# Patient Record
Sex: FEMALE | Race: WHITE | Employment: FULL TIME | URBAN - METROPOLITAN AREA
[De-identification: names, ages, dates, MRNs, and addresses within clinical notes are randomized per-mention and may not be internally consistent; named-entity substitution may affect disease eponyms.]

---

## 2017-01-16 ENCOUNTER — TELEPHONE (OUTPATIENT)
Dept: ENDOCRINOLOGY | Age: 57
End: 2017-01-16

## 2017-01-23 ENCOUNTER — OFFICE VISIT (OUTPATIENT)
Dept: ENDOCRINOLOGY | Age: 57
End: 2017-01-23

## 2017-01-23 VITALS
HEIGHT: 65 IN | TEMPERATURE: 98.8 F | HEART RATE: 95 BPM | OXYGEN SATURATION: 100 % | RESPIRATION RATE: 18 BRPM | DIASTOLIC BLOOD PRESSURE: 84 MMHG | WEIGHT: 204.8 LBS | SYSTOLIC BLOOD PRESSURE: 135 MMHG | BODY MASS INDEX: 34.12 KG/M2

## 2017-01-23 DIAGNOSIS — E10.9 TYPE 1 DIABETES MELLITUS WITHOUT COMPLICATION (HCC): Primary | ICD-10-CM

## 2017-01-23 DIAGNOSIS — E78.2 MIXED HYPERLIPIDEMIA: ICD-10-CM

## 2017-01-23 DIAGNOSIS — I10 ESSENTIAL HYPERTENSION: ICD-10-CM

## 2017-01-23 LAB — HBA1C MFR BLD: 7.9 %

## 2017-01-23 PROCEDURE — 99214 OFFICE O/P EST MOD 30 MIN: CPT | Performed by: INTERNAL MEDICINE

## 2017-01-23 PROCEDURE — 83036 HEMOGLOBIN GLYCOSYLATED A1C: CPT | Performed by: INTERNAL MEDICINE

## 2017-01-24 ENCOUNTER — OFFICE VISIT (OUTPATIENT)
Dept: INTERNAL MEDICINE | Age: 57
End: 2017-01-24

## 2017-01-24 VITALS
DIASTOLIC BLOOD PRESSURE: 70 MMHG | BODY MASS INDEX: 34.52 KG/M2 | HEIGHT: 65 IN | SYSTOLIC BLOOD PRESSURE: 122 MMHG | WEIGHT: 207.2 LBS

## 2017-01-24 DIAGNOSIS — F43.23 SITUATIONAL MIXED ANXIETY AND DEPRESSIVE DISORDER: Primary | ICD-10-CM

## 2017-01-24 PROCEDURE — 99213 OFFICE O/P EST LOW 20 MIN: CPT | Performed by: INTERNAL MEDICINE

## 2017-01-24 RX ORDER — ALPRAZOLAM 1 MG/1
TABLET ORAL
Qty: 90 TABLET | Refills: 0 | Status: SHIPPED | OUTPATIENT
Start: 2017-01-24 | End: 2017-04-17 | Stop reason: SDUPTHER

## 2017-01-24 ASSESSMENT — PATIENT HEALTH QUESTIONNAIRE - PHQ9
SUM OF ALL RESPONSES TO PHQ QUESTIONS 1-9: 0
2. FEELING DOWN, DEPRESSED OR HOPELESS: 0
SUM OF ALL RESPONSES TO PHQ9 QUESTIONS 1 & 2: 0
1. LITTLE INTEREST OR PLEASURE IN DOING THINGS: 0

## 2017-03-06 RX ORDER — ATORVASTATIN CALCIUM 40 MG/1
TABLET, FILM COATED ORAL
Qty: 90 TABLET | Refills: 1 | Status: SHIPPED | OUTPATIENT
Start: 2017-03-06 | End: 2018-01-09 | Stop reason: DRUGHIGH

## 2017-03-06 RX ORDER — PIOGLITAZONEHYDROCHLORIDE 15 MG/1
15 TABLET ORAL DAILY
Qty: 90 TABLET | Refills: 2 | Status: SHIPPED | OUTPATIENT
Start: 2017-03-06 | End: 2017-12-05 | Stop reason: SDUPTHER

## 2017-03-09 ENCOUNTER — TELEPHONE (OUTPATIENT)
Dept: ENDOCRINOLOGY | Age: 57
End: 2017-03-09

## 2017-03-14 RX ORDER — ATORVASTATIN CALCIUM 20 MG/1
20 TABLET, FILM COATED ORAL DAILY
Qty: 90 TABLET | Refills: 1 | Status: SHIPPED | OUTPATIENT
Start: 2017-03-14 | End: 2017-08-31 | Stop reason: SDUPTHER

## 2017-04-17 ENCOUNTER — OFFICE VISIT (OUTPATIENT)
Dept: INTERNAL MEDICINE | Age: 57
End: 2017-04-17

## 2017-04-17 VITALS
SYSTOLIC BLOOD PRESSURE: 144 MMHG | WEIGHT: 204.4 LBS | BODY MASS INDEX: 34.89 KG/M2 | DIASTOLIC BLOOD PRESSURE: 72 MMHG | HEIGHT: 64 IN

## 2017-04-17 DIAGNOSIS — F43.23 SITUATIONAL MIXED ANXIETY AND DEPRESSIVE DISORDER: Primary | ICD-10-CM

## 2017-04-17 DIAGNOSIS — I10 ESSENTIAL HYPERTENSION: ICD-10-CM

## 2017-04-17 PROCEDURE — 99213 OFFICE O/P EST LOW 20 MIN: CPT | Performed by: INTERNAL MEDICINE

## 2017-04-17 RX ORDER — ALPRAZOLAM 1 MG/1
TABLET ORAL
Qty: 90 TABLET | Refills: 0 | OUTPATIENT
Start: 2017-04-17 | End: 2017-07-19 | Stop reason: SDUPTHER

## 2017-04-17 ASSESSMENT — ENCOUNTER SYMPTOMS
RESPIRATORY NEGATIVE: 1
GASTROINTESTINAL NEGATIVE: 1

## 2017-05-17 ENCOUNTER — TELEPHONE (OUTPATIENT)
Dept: ENDOCRINOLOGY | Age: 57
End: 2017-05-17

## 2017-05-17 RX ORDER — ALOGLIPTIN 25 MG/1
25 TABLET, FILM COATED ORAL DAILY
Qty: 30 TABLET | Refills: 3 | Status: SHIPPED | OUTPATIENT
Start: 2017-05-17 | End: 2018-01-09 | Stop reason: ALTCHOICE

## 2017-05-31 RX ORDER — RAMIPRIL 10 MG/1
CAPSULE ORAL
Qty: 90 CAPSULE | Refills: 1 | Status: SHIPPED | OUTPATIENT
Start: 2017-05-31 | End: 2017-12-05 | Stop reason: SDUPTHER

## 2017-07-19 ENCOUNTER — OFFICE VISIT (OUTPATIENT)
Dept: INTERNAL MEDICINE | Age: 57
End: 2017-07-19

## 2017-07-19 VITALS
HEIGHT: 64 IN | SYSTOLIC BLOOD PRESSURE: 132 MMHG | WEIGHT: 199 LBS | DIASTOLIC BLOOD PRESSURE: 72 MMHG | BODY MASS INDEX: 33.97 KG/M2

## 2017-07-19 DIAGNOSIS — F43.23 SITUATIONAL MIXED ANXIETY AND DEPRESSIVE DISORDER: Primary | ICD-10-CM

## 2017-07-19 DIAGNOSIS — I10 ESSENTIAL HYPERTENSION: ICD-10-CM

## 2017-07-19 DIAGNOSIS — F43.89: ICD-10-CM

## 2017-07-19 DIAGNOSIS — E10.9 TYPE 1 DIABETES MELLITUS WITHOUT COMPLICATION (HCC): ICD-10-CM

## 2017-07-19 PROCEDURE — 99213 OFFICE O/P EST LOW 20 MIN: CPT | Performed by: INTERNAL MEDICINE

## 2017-07-19 RX ORDER — ALPRAZOLAM 1 MG/1
TABLET ORAL
Qty: 90 TABLET | Refills: 0 | OUTPATIENT
Start: 2017-07-19 | End: 2017-10-13 | Stop reason: SDUPTHER

## 2017-08-25 ENCOUNTER — HOSPITAL ENCOUNTER (OUTPATIENT)
Dept: MAMMOGRAPHY | Age: 57
Discharge: HOME | End: 2017-08-25
Attending: OBSTETRICS & GYNECOLOGY | Admitting: OBSTETRICS & GYNECOLOGY

## 2017-08-25 DIAGNOSIS — Z12.31 VISIT FOR SCREENING MAMMOGRAM: ICD-10-CM

## 2017-08-31 RX ORDER — GEMFIBROZIL 600 MG/1
TABLET, FILM COATED ORAL
Qty: 180 TABLET | Refills: 2 | Status: SHIPPED | OUTPATIENT
Start: 2017-08-31 | End: 2018-05-22 | Stop reason: SDUPTHER

## 2017-08-31 RX ORDER — ATORVASTATIN CALCIUM 20 MG/1
20 TABLET, FILM COATED ORAL DAILY
Qty: 90 TABLET | Refills: 2 | Status: SHIPPED | OUTPATIENT
Start: 2017-08-31 | End: 2018-05-22 | Stop reason: SDUPTHER

## 2017-09-07 ENCOUNTER — OFFICE VISIT (OUTPATIENT)
Dept: INTERNAL MEDICINE | Age: 57
End: 2017-09-07

## 2017-09-07 VITALS
SYSTOLIC BLOOD PRESSURE: 140 MMHG | HEIGHT: 64 IN | WEIGHT: 201 LBS | TEMPERATURE: 98.1 F | DIASTOLIC BLOOD PRESSURE: 90 MMHG | BODY MASS INDEX: 34.31 KG/M2

## 2017-09-07 DIAGNOSIS — E78.2 MIXED HYPERLIPIDEMIA: ICD-10-CM

## 2017-09-07 DIAGNOSIS — B34.9 VIRAL INFECTION: Primary | ICD-10-CM

## 2017-09-07 DIAGNOSIS — I10 ESSENTIAL HYPERTENSION: ICD-10-CM

## 2017-09-07 DIAGNOSIS — E10.9 TYPE 1 DIABETES MELLITUS WITHOUT COMPLICATION (HCC): ICD-10-CM

## 2017-09-07 PROCEDURE — 99214 OFFICE O/P EST MOD 30 MIN: CPT | Performed by: INTERNAL MEDICINE

## 2017-09-13 ENCOUNTER — HOSPITAL ENCOUNTER (OUTPATIENT)
Dept: OTHER | Age: 57
Discharge: HOME | End: 2017-09-13
Attending: INTERNAL MEDICINE | Admitting: INTERNAL MEDICINE

## 2017-09-13 DIAGNOSIS — E10.9 TYPE 1 DIABETES MELLITUS WITHOUT COMPLICATION (HCC): ICD-10-CM

## 2017-09-13 DIAGNOSIS — I10 ESSENTIAL HYPERTENSION: ICD-10-CM

## 2017-09-13 DIAGNOSIS — E78.2 MIXED HYPERLIPIDEMIA: ICD-10-CM

## 2017-09-13 LAB
A/G RATIO: 1.5 (ref 1.1–2.2)
ALBUMIN SERPL-MCNC: 4.3 G/DL (ref 3.4–5)
ALP BLD-CCNC: 73 U/L (ref 40–129)
ALT SERPL-CCNC: 26 U/L (ref 10–40)
ANION GAP SERPL CALCULATED.3IONS-SCNC: 17 MMOL/L (ref 3–16)
AST SERPL-CCNC: 23 U/L (ref 15–37)
BILIRUB SERPL-MCNC: 0.5 MG/DL (ref 0–1)
BUN BLDV-MCNC: 16 MG/DL (ref 7–20)
CALCIUM SERPL-MCNC: 9.9 MG/DL (ref 8.3–10.6)
CHLORIDE BLD-SCNC: 99 MMOL/L (ref 99–110)
CHOLESTEROL, TOTAL: 165 MG/DL (ref 0–199)
CO2: 24 MMOL/L (ref 21–32)
CREAT SERPL-MCNC: 0.6 MG/DL (ref 0.6–1.1)
ESTIMATED AVERAGE GLUCOSE: 289.1 MG/DL
GFR AFRICAN AMERICAN: >60
GFR NON-AFRICAN AMERICAN: >60
GLOBULIN: 2.9 G/DL
GLUCOSE BLD-MCNC: 158 MG/DL (ref 70–99)
HBA1C MFR BLD: 11.7 %
HDLC SERPL-MCNC: 77 MG/DL (ref 40–60)
LDL CHOLESTEROL CALCULATED: 75 MG/DL
POTASSIUM SERPL-SCNC: 4.1 MMOL/L (ref 3.5–5.1)
SODIUM BLD-SCNC: 140 MMOL/L (ref 136–145)
TOTAL PROTEIN: 7.2 G/DL (ref 6.4–8.2)
TRIGL SERPL-MCNC: 64 MG/DL (ref 0–150)
VLDLC SERPL CALC-MCNC: 13 MG/DL

## 2017-09-18 ENCOUNTER — TELEPHONE (OUTPATIENT)
Dept: INTERNAL MEDICINE | Age: 57
End: 2017-09-18

## 2017-09-18 RX ORDER — AMOXICILLIN 500 MG/1
500 CAPSULE ORAL 3 TIMES DAILY
Qty: 30 CAPSULE | Refills: 0 | Status: SHIPPED | OUTPATIENT
Start: 2017-09-18 | End: 2018-01-09 | Stop reason: ALTCHOICE

## 2017-10-04 ENCOUNTER — TELEPHONE (OUTPATIENT)
Dept: INTERNAL MEDICINE | Age: 57
End: 2017-10-04

## 2017-10-04 NOTE — TELEPHONE ENCOUNTER
Symptoms: calling to inform Lillie Potts MD that she did complete amoxicillin (AMOXIL) 500 MG capsule   However still experiencing a horrible cough clear and appears to be worse during the day not as bad at night     Would like to be advised if she can be Rx an cough medication with codeine per Lillie Potts MD recommendation   Pharmacy:listed   Please call back today [phone]

## 2017-10-05 ENCOUNTER — OFFICE VISIT (OUTPATIENT)
Dept: INTERNAL MEDICINE | Age: 57
End: 2017-10-05

## 2017-10-05 VITALS
SYSTOLIC BLOOD PRESSURE: 124 MMHG | BODY MASS INDEX: 33.63 KG/M2 | WEIGHT: 197 LBS | DIASTOLIC BLOOD PRESSURE: 70 MMHG | HEIGHT: 64 IN | TEMPERATURE: 98.3 F

## 2017-10-05 DIAGNOSIS — J32.4 CHRONIC PANSINUSITIS: Primary | ICD-10-CM

## 2017-10-05 DIAGNOSIS — E10.9 TYPE 1 DIABETES MELLITUS WITHOUT COMPLICATION (HCC): ICD-10-CM

## 2017-10-05 DIAGNOSIS — I10 ESSENTIAL HYPERTENSION: ICD-10-CM

## 2017-10-05 PROCEDURE — 99213 OFFICE O/P EST LOW 20 MIN: CPT | Performed by: INTERNAL MEDICINE

## 2017-10-05 RX ORDER — FLUTICASONE PROPIONATE 50 MCG
2 SPRAY, SUSPENSION (ML) NASAL DAILY
Qty: 1 BOTTLE | Refills: 1 | Status: SHIPPED | OUTPATIENT
Start: 2017-10-05 | End: 2018-01-09 | Stop reason: ALTCHOICE

## 2017-10-05 RX ORDER — AMOXICILLIN AND CLAVULANATE POTASSIUM 875; 125 MG/1; MG/1
1 TABLET, FILM COATED ORAL 2 TIMES DAILY
Qty: 20 TABLET | Refills: 0 | Status: SHIPPED | OUTPATIENT
Start: 2017-10-05 | End: 2017-10-15

## 2017-10-05 NOTE — PROGRESS NOTES
Outpatient Follow Up Note    Subjective:   CHIEF COMPLAINT / HPI:     Rob Patel presents today regarding her ongoing sinus condition where she is continuously unable to clear her sinuses. She has had a course of of amoxicillin and decongestants without improvement. She has had no fever chills or toxicity. She also has other medical problems including Diabetes Type 1, Hyperlipidemia, Asthma and Pancreatitis       Past Medical History:    Past Medical History:   Diagnosis Date    Allergic rhinitis     Asthma     H/O- stress related    Diabetes mellitus, type 1     History of blood transfusion 2001?  Hyperlipidemia     Other acute reactions to stress     Pancreatitis 2000    Rectal abscess     II       Social History:    History   Smoking Status    Never Smoker   Smokeless Tobacco    Never Used         Family History   Problem Relation Age of Onset    Heart Disease Mother     Diabetes Father        Current Medications:  Current Outpatient Prescriptions on File Prior to Visit   Medication Sig Dispense Refill    amoxicillin (AMOXIL) 500 MG capsule Take 1 capsule by mouth 3 times daily 30 capsule 0    sertraline (ZOLOFT) 50 MG tablet TAKE 1 TABLET DAILY FOR MOOD 90 tablet 2    gemfibrozil (LOPID) 600 MG tablet TAKE 1 TABLET TWICE A DAY FOR TRIGLYCERIDES 180 tablet 2    atorvastatin (LIPITOR) 20 MG tablet Take 1 tablet by mouth daily 90 tablet 2    ALPRAZolam (XANAX) 1 MG tablet TAKE ONE TABLET BY MOUTH EVERY NIGHT AT BEDTIME AS NEEDED FOR ANXIETY AND SLEEP 90 tablet 0    ramipril (ALTACE) 10 MG capsule TAKE 1 CAPSULE DAILY FOR BLOOD PRESSURE 90 capsule 1    alogliptin (NESINA) 25 MG TABS tablet Take 1 tablet by mouth daily 30 tablet 3    pioglitazone (ACTOS) 15 MG tablet Take 1 tablet by mouth daily 90 tablet 2    atorvastatin (LIPITOR) 40 MG tablet Takes 20mg nightly 90 tablet 1    glucose blood VI test strips (ONE TOUCH ULTRA TEST) strip Test BS 3 times a day.  100 each 3    insulin glargine vomiting, diarrhea, constipation, abdominal pain or changes in bowel habits. : No urinary frequency, urgency,  SKIN: No cyanosis or skin lesions. MUSCULOSKELETAL: No new muscle or joint pain. NEUROLOGICAL: No change in sensation or strength. PSYCHIATRIC: No anxiety, insomnia or depression    Objective:   PHYSICAL EXAM:        VITALS:  /70  Temp 98.3 °F (36.8 °C) (Oral)   Ht 5' 4\" (1.626 m)  Wt 197 lb (89.4 kg)  BMI 33.81 kg/m2  CONSTITUTIONAL: Cooperative, no apparent distress, and appears well nourished   NEUROLOGIC:  Awake and orientated to person, place and time. Sensation normal Motor normal.  PSYCH: Calm affect. SKIN: Warm and dry. HEENT: Sclera non-icteric, normocephalic. EOMI DAMASO Sinuses congested  Neck: Supple, no elevation of JVP,  LUNGS:  No increased work of breathing and clear to auscultation, no crackles or wheezing  CARDIOVASCULAR:  Regular rate and rhythm with no murmurs, gallops, rubs, or abnormal heart sounds.     DATA:    Lab Results   Component Value Date    ALT 26 09/13/2017    AST 23 09/13/2017    ALKPHOS 73 09/13/2017    BILITOT 0.5 09/13/2017     Lab Results   Component Value Date    CREATININE 0.6 09/13/2017    BUN 16 09/13/2017     09/13/2017    K 4.1 09/13/2017    CL 99 09/13/2017    CO2 24 09/13/2017     Lab Results   Component Value Date    TSH 1.45 10/21/2016    K2CZXFN 6.7 05/03/2016     Lab Results   Component Value Date    WBC 4.9 06/01/2016    HGB 13.1 06/01/2016    HCT 39.4 06/01/2016    MCV 90.9 06/01/2016     06/01/2016     No components found for: CHLPL  Lab Results   Component Value Date    TRIG 64 09/13/2017    TRIG 49 10/21/2016    TRIG 37 05/03/2016     Lab Results   Component Value Date    HDL 77 (H) 09/13/2017    HDL 76 (H) 10/21/2016    HDL 72 05/03/2016     Lab Results   Component Value Date    LDLCALC 75 09/13/2017    LDLCALC 73 10/21/2016    LDLCALC 60 05/03/2016     Lab Results   Component Value Date    LABVLDL 13 09/13/2017    LABVLDL 10 10/21/2016      Assessment Plan :      1 Sinusitis which has not responded to amoxicillin and decongestants       Will increase the antibiotic activity by prescribing Augmentin    2/ Diabetes Type one not being affected by the sinus congestion.        Will continue present dose    Aleksandar Garcia MD  I attest that this note was completed entirely by me

## 2017-10-13 ENCOUNTER — OFFICE VISIT (OUTPATIENT)
Dept: INTERNAL MEDICINE | Age: 57
End: 2017-10-13

## 2017-10-13 VITALS
BODY MASS INDEX: 33.63 KG/M2 | SYSTOLIC BLOOD PRESSURE: 114 MMHG | DIASTOLIC BLOOD PRESSURE: 62 MMHG | WEIGHT: 197 LBS | HEIGHT: 64 IN

## 2017-10-13 DIAGNOSIS — E78.5 HYPERLIPIDEMIA, UNSPECIFIED HYPERLIPIDEMIA TYPE: ICD-10-CM

## 2017-10-13 DIAGNOSIS — E10.9 TYPE 1 DIABETES MELLITUS WITHOUT COMPLICATION (HCC): ICD-10-CM

## 2017-10-13 DIAGNOSIS — I10 ESSENTIAL HYPERTENSION: ICD-10-CM

## 2017-10-13 DIAGNOSIS — F43.23 SITUATIONAL MIXED ANXIETY AND DEPRESSIVE DISORDER: Primary | ICD-10-CM

## 2017-10-13 PROCEDURE — 99214 OFFICE O/P EST MOD 30 MIN: CPT | Performed by: INTERNAL MEDICINE

## 2017-10-13 RX ORDER — ALPRAZOLAM 1 MG/1
TABLET ORAL
Qty: 90 TABLET | Refills: 0 | OUTPATIENT
Start: 2017-10-13 | End: 2018-01-10 | Stop reason: SDUPTHER

## 2017-10-13 NOTE — ASSESSMENT & PLAN NOTE
Controlled with diet exercise and medication without side effects. See endocrinologist on a regular basis.

## 2017-10-13 NOTE — PROGRESS NOTES
Outpatient Follow Up Note    Subjective:   CHIEF COMPLAINT / HPI:    Alfredo Silva  presents today for follow up on her anxiety treatment which she requires to control her stress. She also has other ongoing medical problems including Diabetes,Hypertension, Hyperlipidemi and asthma     Past Medical History:    Past Medical History:   Diagnosis Date    Allergic rhinitis     Asthma     H/O- stress related    Diabetes mellitus, type 1     History of blood transfusion 2001?  Hyperlipidemia     Other acute reactions to stress     Pancreatitis 2000    Rectal abscess     II       Social History:    History   Smoking Status    Never Smoker   Smokeless Tobacco    Never Used         Family History   Problem Relation Age of Onset    Heart Disease Mother     Diabetes Father        Current Medications:  Current Outpatient Prescriptions on File Prior to Visit   Medication Sig Dispense Refill           fluticasone (FLONASE) 50 MCG/ACT nasal spray 2 sprays by Nasal route daily 1 Bottle 1      30 capsule 0    sertraline (ZOLOFT) 50 MG tablet TAKE 1 TABLET DAILY FOR MOOD 90 tablet 2    gemfibrozil (LOPID) 600 MG tablet TAKE 1 TABLET TWICE A DAY FOR TRIGLYCERIDES 180 tablet 2    atorvastatin (LIPITOR) 20 MG tablet Take 1 tablet by mouth daily 90 tablet 2    ramipril (ALTACE) 10 MG capsule TAKE 1 CAPSULE DAILY FOR BLOOD PRESSURE 90 capsule 1    alogliptin (NESINA) 25 MG TABS tablet Take 1 tablet by mouth daily 30 tablet 3    pioglitazone (ACTOS) 15 MG tablet Take 1 tablet by mouth daily 90 tablet 2    atorvastatin (LIPITOR) 40 MG tablet Takes 20mg nightly 90 tablet 1    glucose blood VI test strips (ONE TOUCH ULTRA TEST) strip Test BS 3 times a day.  100 each 3    insulin glargine (LANTUS SOLOSTAR) 100 UNIT/ML injection pen Inject 70 Units into the skin nightly 10 Pen 3    Dulaglutide (TRULICITY) 6.91 CP/3.8DI SOPN Inject 0.75 mg into the skin once a week 4 Pen 3    metFORMIN (GLUCOPHAGE) 850 MG tablet TAKE 1 TABLET THREE TIMES A DAY WITH MEALS FOR DIABETES 270 tablet 2    ONETOUCH DELICA LANCETS 86N MISC 1 each by Does not apply route 3 times daily (before meals) 100 each 11    Blood Glucose Monitoring Suppl (ONE TOUCH ULTRA 2) W/DEVICE KIT 1 kit by Does not apply route daily as needed (Test BS 3 times a day.) 1 kit 0    ONE TOUCH ULTRASOFT LANCETS MISC Test BS 3 times a day. 100 each 3    Glucosamine-Chondroitin (GLUCOSAMINE CHONDR COMPLEX PO) Take by mouth daily      Multiple Vitamins-Minerals (THERAPEUTIC MULTIVITAMIN-MINERALS) tablet Take 1 tablet by mouth daily      Cholecalciferol (CVS VIT D 5000 HIGH-POTENCY) 5000 UNITS CAPS capsule All patients should take 5000 IU or more Vitamin D every day for good general health. There are myriad benefits to higher Vitamin D levels. Women should also take Calcium 600 mg twice a day for bone health. 90 capsule 12    cyanocobalamin (CVS VITAMIN B12) 1000 MCG tablet Everyone should take OTC B12 1000 mcg twice a day for neurological health. High B12 levels help prevent dementia. 180 tablet 3    fish oil-omega-3 fatty acids 1000 MG capsule Take 1 g by mouth three times daily.  fexofenadine (ALLEGRA) 180 MG tablet Take 180 mg by mouth daily. No current facility-administered medications on file prior to visit. Allergies   Allergen Reactions    Morphine      Unsure of reaction       REVIEW OF SYSTEMS:      CONSTITUTIONAL: No major weight gain or loss, fatigue, weakness, night sweats or fever. HEENT: No new vision difficulties or ringing in the ears. No congestion or sore throat. RESPIRATORY: No new SOB, PND, orthopnea or cough. CARDIOVASCULAR: No chest pain or palpitations. GI: No diarrhea, constipation, abdominal pain or changes in bowel habits. : No urinary frequency, urgency, incontinence hematuria or dysuria. SKIN: No cyanosis or skin lesions. MUSCULOSKELETAL: No new muscle or joint pain. NEUROLOGICAL: No syncope or TIA-like symptoms.  No change in sensation or strength. PSYCHIATRIC:Positive for anxiety and depression    Objective:   PHYSICAL EXAM:        VITALS:  /62   Ht 5' 4\" (1.626 m)   Wt 197 lb (89.4 kg)   BMI 33.81 kg/m²   CONSTITUTIONAL: Cooperative, no apparent distress, and appears well nourished / developed  NEUROLOGIC:  Awake and orientated to person, place and time. Sensation normal Motor normal.  PSYCH: Calm affect. SKIN: Warm and dry. HEENT: Sclera non-icteric, normocephalic. EOMI DAMASO  Neck: Supple, no elevation of JVP, normal carotid pulses with no bruits and thyroid normal size. LUNGS:  No increased work of breathing and clear to auscultation, no crackles or wheezing  CARDIOVASCULAR:  Regular rate and rhythm with no murmurs, gallops, rubs, or abnormal heart sounds. The apical impulse not displaced  JVP is not elevated  ABDOMEN:  Normal bowel sounds, non-distended and non-tender to palpation. No liver or spleen enlargement. EXT: No edema, no calf tenderness. Pulses are present bilaterally. DATA:    Lab Results   Component Value Date    ALT 26 09/13/2017    AST 23 09/13/2017    ALKPHOS 73 09/13/2017    BILITOT 0.5 09/13/2017     Lab Results   Component Value Date    CREATININE 0.6 09/13/2017    BUN 16 09/13/2017     09/13/2017    K 4.1 09/13/2017    CL 99 09/13/2017    CO2 24 09/13/2017         Assessment Plan :     1. Situational mixed anxiety and depressive disorder  Anxiety under good control on present medication    2. Diabetes mellitus type 1 ([facility] [address]    Monitors and well controlled on present regimen without  complications. Sees Endocrinologist and podiatrist and opthalmoligist on regular basis. 3.  Hyperlipidemia    Remains on statin  No c/o muscle aches  No chest pain  Last lipid panel was in good range  Discussed healthy diet and lifestyle    4. Essential Hypertension    Blood pressure control has been optimal.  No complaints of headaches. No end organ damage.   Encouraged to limit sodium intake

## 2017-12-06 RX ORDER — RAMIPRIL 10 MG/1
CAPSULE ORAL
Qty: 90 CAPSULE | Refills: 0 | Status: SHIPPED | OUTPATIENT
Start: 2017-12-06 | End: 2018-03-02 | Stop reason: SDUPTHER

## 2017-12-06 RX ORDER — PIOGLITAZONEHYDROCHLORIDE 15 MG/1
TABLET ORAL
Qty: 90 TABLET | Refills: 0 | Status: SHIPPED | OUTPATIENT
Start: 2017-12-06 | End: 2018-03-02 | Stop reason: SDUPTHER

## 2018-01-09 ENCOUNTER — OFFICE VISIT (OUTPATIENT)
Dept: ENDOCRINOLOGY | Age: 58
End: 2018-01-09

## 2018-01-09 VITALS
HEART RATE: 92 BPM | HEIGHT: 65 IN | RESPIRATION RATE: 18 BRPM | OXYGEN SATURATION: 98 % | DIASTOLIC BLOOD PRESSURE: 83 MMHG | BODY MASS INDEX: 33.32 KG/M2 | SYSTOLIC BLOOD PRESSURE: 133 MMHG | WEIGHT: 200 LBS

## 2018-01-09 DIAGNOSIS — I10 ESSENTIAL HYPERTENSION: ICD-10-CM

## 2018-01-09 DIAGNOSIS — E10.9 TYPE 1 DIABETES MELLITUS WITHOUT COMPLICATION (HCC): Primary | ICD-10-CM

## 2018-01-09 DIAGNOSIS — E78.5 HYPERLIPIDEMIA, UNSPECIFIED HYPERLIPIDEMIA TYPE: ICD-10-CM

## 2018-01-09 LAB — HBA1C MFR BLD: 10.9 %

## 2018-01-09 PROCEDURE — 99214 OFFICE O/P EST MOD 30 MIN: CPT | Performed by: INTERNAL MEDICINE

## 2018-01-09 PROCEDURE — 83036 HEMOGLOBIN GLYCOSYLATED A1C: CPT | Performed by: INTERNAL MEDICINE

## 2018-01-09 NOTE — PROGRESS NOTES
Endocrinology      Blank Alva M.D. Phone: [phone]   FAX: [phone]       Arnie Locke   YOB: 1960    Date of Visit:  1/9/2018    Allergies   Allergen Reactions    Morphine      Unsure of reaction     Outpatient Prescriptions Marked as Taking for the 1/9/18 encounter (Office Visit) with Fercho Flores MD   Medication Sig Dispense Refill    insulin glargine (LANTUS SOLOSTAR) 100 UNIT/ML injection pen Inject 70 Units into the skin nightly 15 mL 0    pioglitazone (ACTOS) 15 MG tablet TAKE 1 TABLET DAILY 90 tablet 0    metFORMIN (GLUCOPHAGE) 850 MG tablet TAKE 1 TABLET THREE TIMES A DAY WITH MEALS FOR DIABETES 270 tablet 0    ramipril (ALTACE) 10 MG capsule TAKE 1 CAPSULE DAILY FOR BLOOD PRESSURE 90 capsule 0    ALPRAZolam (XANAX) 1 MG tablet TAKE ONE TABLET BY MOUTH EVERY NIGHT AT BEDTIME AS NEEDED FOR ANXIETY AND SLEEP 90 tablet 0    sertraline (ZOLOFT) 50 MG tablet TAKE 1 TABLET DAILY FOR MOOD 90 tablet 2    gemfibrozil (LOPID) 600 MG tablet TAKE 1 TABLET TWICE A DAY FOR TRIGLYCERIDES 180 tablet 2    atorvastatin (LIPITOR) 20 MG tablet Take 1 tablet by mouth daily 90 tablet 2    glucose blood VI test strips (ONE TOUCH ULTRA TEST) strip Test BS 3 times a day. 100 each 3    ONETOUCH DELICA LANCETS 51D MISC 1 each by Does not apply route 3 times daily (before meals) 100 each 11    Blood Glucose Monitoring Suppl (ONE TOUCH ULTRA 2) W/DEVICE KIT 1 kit by Does not apply route daily as needed (Test BS 3 times a day.) 1 kit 0    Glucosamine-Chondroitin (GLUCOSAMINE CHONDR COMPLEX PO) Take by mouth daily      Multiple Vitamins-Minerals (THERAPEUTIC MULTIVITAMIN-MINERALS) tablet Take 1 tablet by mouth daily      Cholecalciferol (CVS VIT D 5000 HIGH-POTENCY) 5000 UNITS CAPS capsule All patients should take 5000 IU or more Vitamin D every day for good general health. There are myriad benefits to higher Vitamin D levels.  Women should also take Calcium 600 mg twice a day for bone health. 90 capsule 12    cyanocobalamin (CVS VITAMIN B12) 1000 MCG tablet Everyone should take OTC B12 1000 mcg twice a day for neurological health. High B12 levels help prevent dementia. 180 tablet 3    fish oil-omega-3 fatty acids 1000 MG capsule Take 1 g by mouth three times daily. Vitals:    01/09/18 1523   BP: 133/83   Site: Left Arm   Position: Sitting   Cuff Size: Large Adult   Pulse: 92   Resp: 18   SpO2: 98%   Weight: 200 lb (90.7 kg)   Height: 5' 4.6\" (1.641 m)     Body mass index is 33.7 kg/m². Wt Readings from Last 3 Encounters:   01/09/18 200 lb (90.7 kg)   10/13/17 197 lb (89.4 kg)   10/05/17 197 lb (89.4 kg)     BP Readings from Last 3 Encounters:   01/09/18 133/83   10/13/17 114/62   10/05/17 124/70        Past Medical History:   Diagnosis Date    Allergic rhinitis     Asthma     H/O- stress related    Diabetes mellitus, type 1     History of blood transfusion 2001?  Hyperlipidemia     Other acute reactions to stress     Pancreatitis 2000    Rectal abscess     II     Past Surgical History:   Procedure Laterality Date    COLONOSCOPY      HYSTERECTOMY      KNEE ARTHROSCOPY Right 2014 x2    Dr. Viet King ARTHROSCOPY Left 2015    OTHER SURGICAL HISTORY  6/1/16    EUS, no interventions    OTHER SURGICAL HISTORY  6/2/16    EXAMINATION UNDER ANESTHESIA, EXCISION OF ANAL MASS    OVARY REMOVAL Bilateral      Family History   Problem Relation Age of Onset    Heart Disease Mother     Diabetes Father      History   Smoking Status    Never Smoker   Smokeless Tobacco    Never Used      History   Alcohol Use No       HPI        Elaine Bhakta is a 65 y.o. female who is here for a follow-up for management of uncontrolled DM. PCP   Jenifer Peck MD      Was previously seeing Dr. Ludivina Coughlin at the [facility].      Patient has a PMH of  DM, hypertension, hyperlipidemia, obesity     Diagnosed with Diabetes Mellitus  in 2001 after she had

## 2018-01-10 ENCOUNTER — OFFICE VISIT (OUTPATIENT)
Dept: INTERNAL MEDICINE | Age: 58
End: 2018-01-10

## 2018-01-10 VITALS — BODY MASS INDEX: 34.33 KG/M2 | HEIGHT: 64 IN | SYSTOLIC BLOOD PRESSURE: 154 MMHG | DIASTOLIC BLOOD PRESSURE: 90 MMHG

## 2018-01-10 DIAGNOSIS — F43.23 SITUATIONAL MIXED ANXIETY AND DEPRESSIVE DISORDER: Primary | ICD-10-CM

## 2018-01-10 PROCEDURE — 99213 OFFICE O/P EST LOW 20 MIN: CPT | Performed by: INTERNAL MEDICINE

## 2018-01-10 RX ORDER — ALPRAZOLAM 1 MG/1
TABLET ORAL
Qty: 90 TABLET | Refills: 0 | OUTPATIENT
Start: 2018-01-10 | End: 2018-04-10 | Stop reason: SDUPTHER

## 2018-01-13 ASSESSMENT — ENCOUNTER SYMPTOMS
ABDOMINAL PAIN: 0
SHORTNESS OF BREATH: 0
COUGH: 0

## 2018-01-13 NOTE — PROGRESS NOTES
Subjective:      Patient ID:HPI  Patient returns for reevaluation of and refill of her medication Alprazolam which she takes for her anxiety and depression. She takes the medications as directed and has no side effects. She has multiple other medical problems which are stable at present. Current Outpatient Prescriptions   Medication Sig Dispense Refill    ALPRAZolam (XANAX) 1 MG tablet TAKE ONE TABLET BY MOUTH EVERY NIGHT AT BEDTIME AS NEEDED FOR ANXIETY AND SLEEP. 90 tablet 0    insulin lispro (HUMALOG KWIKPEN) 100 UNIT/ML pen Inject 8 Units into the skin 3 times daily (before meals) 5 pen 3    insulin glargine (LANTUS SOLOSTAR) 100 UNIT/ML injection pen Inject 50 Units into the skin nightly 5 pen 5    Insulin Pen Needle (B-D UF III MINI PEN NEEDLES) 31G X 5 MM MISC Inject insulin 4 times a day 120 each 6    pioglitazone (ACTOS) 15 MG tablet TAKE 1 TABLET DAILY 90 tablet 0    metFORMIN (GLUCOPHAGE) 850 MG tablet TAKE 1 TABLET THREE TIMES A DAY WITH MEALS FOR DIABETES 270 tablet 0    ramipril (ALTACE) 10 MG capsule TAKE 1 CAPSULE DAILY FOR BLOOD PRESSURE 90 capsule 0    sertraline (ZOLOFT) 50 MG tablet TAKE 1 TABLET DAILY FOR MOOD 90 tablet 2    gemfibrozil (LOPID) 600 MG tablet TAKE 1 TABLET TWICE A DAY FOR TRIGLYCERIDES 180 tablet 2    atorvastatin (LIPITOR) 20 MG tablet Take 1 tablet by mouth daily 90 tablet 2    glucose blood VI test strips (ONE TOUCH ULTRA TEST) strip Test BS 3 times a day.  100 each 3    ONETOUCH DELICA LANCETS 41V MISC 1 each by Does not apply route 3 times daily (before meals) 100 each 11    Blood Glucose Monitoring Suppl (ONE TOUCH ULTRA 2) W/DEVICE KIT 1 kit by Does not apply route daily as needed (Test BS 3 times a day.) 1 kit 0    Glucosamine-Chondroitin (GLUCOSAMINE CHONDR COMPLEX PO) Take by mouth daily      Multiple Vitamins-Minerals (THERAPEUTIC MULTIVITAMIN-MINERALS) tablet Take 1 tablet by mouth daily      Cholecalciferol (CVS VIT D 5000 HIGH-POTENCY) 5000 UNITS

## 2018-02-02 ENCOUNTER — TELEPHONE (OUTPATIENT)
Dept: ENDOCRINOLOGY | Age: 58
End: 2018-02-02

## 2018-02-02 NOTE — TELEPHONE ENCOUNTER
I spoke with Nathaniel Louie, let her know [facility]] does not have a savings card. She will check to see if the Lantus savings card can be used this year too. I also let her know about the lower cost of Novolin R and NPH. She states this is an inconvienence to her. She will think about the options and call back.

## 2018-03-05 RX ORDER — PIOGLITAZONEHYDROCHLORIDE 15 MG/1
TABLET ORAL
Qty: 90 TABLET | Refills: 3 | Status: SHIPPED | OUTPATIENT
Start: 2018-03-05 | End: 2019-02-05 | Stop reason: SDUPTHER

## 2018-03-05 RX ORDER — RAMIPRIL 10 MG/1
CAPSULE ORAL
Qty: 90 CAPSULE | Refills: 3 | Status: SHIPPED | OUTPATIENT
Start: 2018-03-05 | End: 2019-02-05 | Stop reason: SDUPTHER

## 2018-04-10 ENCOUNTER — OFFICE VISIT (OUTPATIENT)
Dept: INTERNAL MEDICINE | Age: 58
End: 2018-04-10

## 2018-04-10 VITALS
WEIGHT: 210 LBS | HEART RATE: 66 BPM | HEIGHT: 65 IN | BODY MASS INDEX: 34.99 KG/M2 | DIASTOLIC BLOOD PRESSURE: 80 MMHG | SYSTOLIC BLOOD PRESSURE: 130 MMHG

## 2018-04-10 DIAGNOSIS — E78.5 HYPERLIPIDEMIA, UNSPECIFIED HYPERLIPIDEMIA TYPE: ICD-10-CM

## 2018-04-10 DIAGNOSIS — I10 ESSENTIAL HYPERTENSION: ICD-10-CM

## 2018-04-10 DIAGNOSIS — F41.1 GENERALIZED ANXIETY DISORDER: Primary | ICD-10-CM

## 2018-04-10 DIAGNOSIS — E10.9 TYPE 1 DIABETES MELLITUS WITHOUT COMPLICATION (HCC): ICD-10-CM

## 2018-04-10 PROCEDURE — 99213 OFFICE O/P EST LOW 20 MIN: CPT | Performed by: INTERNAL MEDICINE

## 2018-04-10 RX ORDER — ALPRAZOLAM 1 MG/1
TABLET ORAL
Qty: 90 TABLET | Refills: 0 | OUTPATIENT
Start: 2018-04-10 | End: 2018-07-09 | Stop reason: SDUPTHER

## 2018-04-18 ENCOUNTER — OFFICE VISIT (OUTPATIENT)
Dept: ENDOCRINOLOGY | Age: 58
End: 2018-04-18

## 2018-04-18 ENCOUNTER — HOSPITAL ENCOUNTER (OUTPATIENT)
Dept: OTHER | Age: 58
Discharge: HOME | End: 2018-04-18
Attending: INTERNAL MEDICINE | Admitting: INTERNAL MEDICINE

## 2018-04-18 VITALS
DIASTOLIC BLOOD PRESSURE: 73 MMHG | BODY MASS INDEX: 35.12 KG/M2 | RESPIRATION RATE: 20 BRPM | HEART RATE: 99 BPM | HEIGHT: 65 IN | WEIGHT: 210.8 LBS | OXYGEN SATURATION: 93 % | SYSTOLIC BLOOD PRESSURE: 125 MMHG

## 2018-04-18 DIAGNOSIS — E55.9 VITAMIN D DEFICIENCY: ICD-10-CM

## 2018-04-18 DIAGNOSIS — I10 ESSENTIAL HYPERTENSION: ICD-10-CM

## 2018-04-18 DIAGNOSIS — R53.83 OTHER FATIGUE: ICD-10-CM

## 2018-04-18 DIAGNOSIS — E10.9 TYPE 1 DIABETES MELLITUS WITHOUT COMPLICATION (HCC): Primary | ICD-10-CM

## 2018-04-18 DIAGNOSIS — E10.9 TYPE 1 DIABETES MELLITUS WITHOUT COMPLICATION (HCC): ICD-10-CM

## 2018-04-18 DIAGNOSIS — E78.5 HYPERLIPIDEMIA, UNSPECIFIED HYPERLIPIDEMIA TYPE: ICD-10-CM

## 2018-04-18 LAB
A/G RATIO: 1.9 (ref 1.1–2.2)
ALBUMIN SERPL-MCNC: 4.5 G/DL (ref 3.4–5)
ALP BLD-CCNC: 59 U/L (ref 40–129)
ALT SERPL-CCNC: 13 U/L (ref 10–40)
ANION GAP SERPL CALCULATED.3IONS-SCNC: 15 MMOL/L (ref 3–16)
AST SERPL-CCNC: 15 U/L (ref 15–37)
BILIRUB SERPL-MCNC: <0.2 MG/DL (ref 0–1)
BUN BLDV-MCNC: 12 MG/DL (ref 7–20)
CALCIUM SERPL-MCNC: 9.8 MG/DL (ref 8.3–10.6)
CHLORIDE BLD-SCNC: 104 MMOL/L (ref 99–110)
CO2: 24 MMOL/L (ref 21–32)
CREAT SERPL-MCNC: <0.5 MG/DL (ref 0.6–1.1)
FOLATE: 13.78 NG/ML (ref 4.78–24.2)
GFR AFRICAN AMERICAN: >60
GFR NON-AFRICAN AMERICAN: >60
GLOBULIN: 2.4 G/DL
GLUCOSE BLD-MCNC: 95 MG/DL (ref 70–99)
HBA1C MFR BLD: 9.1 %
POTASSIUM SERPL-SCNC: 3.7 MMOL/L (ref 3.5–5.1)
SODIUM BLD-SCNC: 143 MMOL/L (ref 136–145)
T4 FREE: 1.2 NG/DL (ref 0.9–1.8)
TOTAL PROTEIN: 6.9 G/DL (ref 6.4–8.2)
TSH SERPL DL<=0.05 MIU/L-ACNC: 1.76 UIU/ML (ref 0.27–4.2)
VITAMIN B-12: >2000 PG/ML (ref 211–911)
VITAMIN D 25-HYDROXY: 62.9 NG/ML

## 2018-04-18 PROCEDURE — 83036 HEMOGLOBIN GLYCOSYLATED A1C: CPT | Performed by: INTERNAL MEDICINE

## 2018-04-18 PROCEDURE — 99214 OFFICE O/P EST MOD 30 MIN: CPT | Performed by: INTERNAL MEDICINE

## 2018-05-23 RX ORDER — GEMFIBROZIL 600 MG/1
TABLET, FILM COATED ORAL
Qty: 180 TABLET | Refills: 3 | Status: SHIPPED | OUTPATIENT
Start: 2018-05-23 | End: 2019-05-13 | Stop reason: SDUPTHER

## 2018-05-23 RX ORDER — ATORVASTATIN CALCIUM 20 MG/1
TABLET, FILM COATED ORAL
Qty: 90 TABLET | Refills: 3 | Status: SHIPPED | OUTPATIENT
Start: 2018-05-23 | End: 2019-05-13 | Stop reason: SDUPTHER

## 2018-07-09 ENCOUNTER — OFFICE VISIT (OUTPATIENT)
Dept: INTERNAL MEDICINE | Age: 58
End: 2018-07-09

## 2018-07-09 VITALS
HEART RATE: 70 BPM | TEMPERATURE: 98 F | WEIGHT: 211 LBS | HEIGHT: 64 IN | BODY MASS INDEX: 36.02 KG/M2 | SYSTOLIC BLOOD PRESSURE: 110 MMHG | DIASTOLIC BLOOD PRESSURE: 60 MMHG

## 2018-07-09 DIAGNOSIS — I10 ESSENTIAL HYPERTENSION: ICD-10-CM

## 2018-07-09 DIAGNOSIS — E11.9 TYPE 2 DIABETES MELLITUS WITHOUT COMPLICATION, WITH LONG-TERM CURRENT USE OF INSULIN (HCC): ICD-10-CM

## 2018-07-09 DIAGNOSIS — Z79.4 TYPE 2 DIABETES MELLITUS WITHOUT COMPLICATION, WITH LONG-TERM CURRENT USE OF INSULIN (HCC): ICD-10-CM

## 2018-07-09 DIAGNOSIS — E78.49 OTHER HYPERLIPIDEMIA: ICD-10-CM

## 2018-07-09 DIAGNOSIS — F43.23 SITUATIONAL MIXED ANXIETY AND DEPRESSIVE DISORDER: Primary | ICD-10-CM

## 2018-07-09 PROCEDURE — 99214 OFFICE O/P EST MOD 30 MIN: CPT | Performed by: INTERNAL MEDICINE

## 2018-07-09 RX ORDER — ALPRAZOLAM 1 MG/1
TABLET ORAL
Qty: 90 TABLET | Refills: 0 | OUTPATIENT
Start: 2018-07-09 | End: 2018-10-03 | Stop reason: SDUPTHER

## 2018-07-09 ASSESSMENT — ENCOUNTER SYMPTOMS
RESPIRATORY NEGATIVE: 1
GASTROINTESTINAL NEGATIVE: 1

## 2018-07-09 ASSESSMENT — PATIENT HEALTH QUESTIONNAIRE - PHQ9
2. FEELING DOWN, DEPRESSED OR HOPELESS: 0
SUM OF ALL RESPONSES TO PHQ9 QUESTIONS 1 & 2: 0
SUM OF ALL RESPONSES TO PHQ QUESTIONS 1-9: 0
1. LITTLE INTEREST OR PLEASURE IN DOING THINGS: 0

## 2018-07-09 NOTE — PROGRESS NOTES
general health. There are myriad benefits to higher Vitamin D levels. Women should also take Calcium 600 mg twice a day for bone health. 90 capsule 12    cyanocobalamin (CVS VITAMIN B12) 1000 MCG tablet Everyone should take OTC B12 1000 mcg twice a day for neurological health. High B12 levels help prevent dementia. 180 tablet 3    fish oil-omega-3 fatty acids 1000 MG capsule Take 1 g by mouth three times daily. No current facility-administered medications for this visit. Review of Systems   Constitutional: Negative for activity change and unexpected weight change. Respiratory: Negative. Cardiovascular: Negative. Gastrointestinal: Negative. Genitourinary: Negative for dysuria, flank pain and vaginal bleeding. Musculoskeletal: Negative for arthralgias and myalgias. Skin: Negative. Negative for rash. Neurological: Negative for dizziness and light-headedness. Psychiatric/Behavioral: Negative for confusion, decreased concentration and dysphoric mood. The patient is not nervous/anxious. All other systems reviewed and are negative. Objective:   Physical Exam   Constitutional: She is oriented to person, place, and time. HENT:   Right Ear: External ear normal.   Left Ear: External ear normal.   Mouth/Throat: Oropharynx is clear and moist.   Neck: No thyromegaly present. Cardiovascular: Normal rate, regular rhythm, normal heart sounds and intact distal pulses. No murmur heard. Pulmonary/Chest: Effort normal. She has no wheezes. She has no rales. Abdominal: Soft. Bowel sounds are normal. There is no tenderness. Musculoskeletal: She exhibits no edema. Neurological: She is alert and oriented to person, place, and time. No cranial nerve deficit. Anxiety controlled   Skin: Skin is warm. No rash noted. Psychiatric: She has a normal mood and affect. Her behavior is normal. Judgment and thought content normal.       Assessment:    1.   Essential Hypertension    Blood

## 2018-08-22 DIAGNOSIS — E10.9 TYPE 1 DIABETES MELLITUS WITHOUT COMPLICATION (HCC): Primary | ICD-10-CM

## 2018-08-27 ENCOUNTER — HOSPITAL ENCOUNTER (OUTPATIENT)
Dept: MAMMOGRAPHY | Age: 58
Discharge: HOME | End: 2018-08-27
Payer: COMMERCIAL

## 2018-08-27 DIAGNOSIS — Z12.31 VISIT FOR SCREENING MAMMOGRAM: ICD-10-CM

## 2018-08-27 PROCEDURE — 77063 BREAST TOMOSYNTHESIS BI: CPT

## 2018-08-29 ENCOUNTER — OFFICE VISIT (OUTPATIENT)
Dept: ENDOCRINOLOGY | Age: 58
End: 2018-08-29

## 2018-08-29 VITALS
HEIGHT: 65 IN | OXYGEN SATURATION: 97 % | SYSTOLIC BLOOD PRESSURE: 125 MMHG | WEIGHT: 216 LBS | DIASTOLIC BLOOD PRESSURE: 72 MMHG | RESPIRATION RATE: 18 BRPM | BODY MASS INDEX: 35.99 KG/M2 | HEART RATE: 95 BPM

## 2018-08-29 DIAGNOSIS — I10 ESSENTIAL HYPERTENSION: ICD-10-CM

## 2018-08-29 DIAGNOSIS — E55.9 VITAMIN D DEFICIENCY: ICD-10-CM

## 2018-08-29 DIAGNOSIS — E78.49 OTHER HYPERLIPIDEMIA: ICD-10-CM

## 2018-08-29 DIAGNOSIS — E10.9 TYPE 1 DIABETES MELLITUS WITHOUT COMPLICATION (HCC): Primary | ICD-10-CM

## 2018-08-29 PROCEDURE — 99214 OFFICE O/P EST MOD 30 MIN: CPT | Performed by: INTERNAL MEDICINE

## 2018-08-29 NOTE — PROGRESS NOTES
Endocrinology      Ritesh Perales M.D. Phone: [phone]   FAX: [phone]       Naveen Robert   YOB: 1960    Date of Visit:  8/29/2018    Allergies   Allergen Reactions    Morphine      Unsure of reaction     Outpatient Prescriptions Marked as Taking for the 8/29/18 encounter (Office Visit) with Mayra Barker MD   Medication Sig Dispense Refill    ALPRAZolam (XANAX) 1 MG tablet TAKE ONE TABLET BY MOUTH EVERY NIGHT AT BEDTIME AS NEEDED FOR ANXIETY AND SLEEP. 90 tablet 0    atorvastatin (LIPITOR) 20 MG tablet TAKE 1 TABLET DAILY 90 tablet 3    gemfibrozil (LOPID) 600 MG tablet TAKE 1 TABLET TWICE A DAY FOR TRIGLYCERIDES 180 tablet 3    sertraline (ZOLOFT) 50 MG tablet TAKE 1 TABLET DAILY FOR MOOD 90 tablet 3    metFORMIN (GLUCOPHAGE) 850 MG tablet TAKE 1 TABLET THREE TIMES A DAY WITH MEALS FOR DIABETES 270 tablet 3    ramipril (ALTACE) 10 MG capsule TAKE 1 CAPSULE DAILY FOR BLOOD PRESSURE 90 capsule 3    pioglitazone (ACTOS) 15 MG tablet TAKE 1 TABLET DAILY 90 tablet 3    insulin lispro (HUMALOG KWIKPEN) 100 UNIT/ML pen Inject 8 Units into the skin 3 times daily (before meals) (Patient taking differently: Inject 6 Units into the skin 3 times daily (before meals) ) 5 pen 3    insulin glargine (LANTUS SOLOSTAR) 100 UNIT/ML injection pen Inject 50 Units into the skin nightly (Patient taking differently: Inject 25 Units into the skin nightly ) 5 pen 5    Insulin Pen Needle (B-D UF III MINI PEN NEEDLES) 31G X 5 MM MISC Inject insulin 4 times a day 120 each 6    glucose blood VI test strips (ONE TOUCH ULTRA TEST) strip Test BS 3 times a day.  100 each 3    ONETOUCH DELICA LANCETS 53Q MISC 1 each by Does not apply route 3 times daily (before meals) 100 each 11    Blood Glucose Monitoring Suppl (ONE TOUCH ULTRA 2) W/DEVICE KIT 1 kit by Does not apply route daily as needed (Test BS 3 times a day.) 1 kit 0    Glucosamine-Chondroitin (GLUCOSAMINE CHONDR COMPLEX PO) Take by mouth daily      Cholecalciferol (CVS VIT D 5000 HIGH-POTENCY) 5000 UNITS CAPS capsule All patients should take 5000 IU or more Vitamin D every day for good general health. There are myriad benefits to higher Vitamin D levels. Women should also take Calcium 600 mg twice a day for bone health. 90 capsule 12    cyanocobalamin (CVS VITAMIN B12) 1000 MCG tablet Everyone should take OTC B12 1000 mcg twice a day for neurological health. High B12 levels help prevent dementia. 180 tablet 3    fish oil-omega-3 fatty acids 1000 MG capsule Take 1 g by mouth three times daily. Vitals:    08/29/18 1538   BP: 125/72   Site: Left Arm   Position: Sitting   Cuff Size: Large Adult   Pulse: 95   Resp: 18   SpO2: 97%   Weight: 216 lb (98 kg)   Height: 5' 4.6\" (1.641 m)     Body mass index is 36.39 kg/m². Wt Readings from Last 3 Encounters:   08/29/18 216 lb (98 kg)   07/09/18 211 lb (95.7 kg)   04/18/18 210 lb 12.8 oz (95.6 kg)     BP Readings from Last 3 Encounters:   08/29/18 125/72   07/09/18 110/60   04/18/18 125/73        Past Medical History:   Diagnosis Date    Allergic rhinitis     Asthma     H/O- stress related    Diabetes mellitus, type 1     History of blood transfusion 2001?     Hyperlipidemia     Other acute reactions to stress     Pancreatitis 2000    Rectal abscess     II     Past Surgical History:   Procedure Laterality Date    COLONOSCOPY      HYSTERECTOMY      KNEE ARTHROSCOPY Right 2014 x2    Dr. Jaycob Bansal ARTHROSCOPY Left 2015    OTHER SURGICAL HISTORY  6/1/16    EUS, no interventions    OTHER SURGICAL HISTORY  6/2/16    EXAMINATION UNDER ANESTHESIA, EXCISION OF ANAL MASS    OVARY REMOVAL Bilateral      Family History   Problem Relation Age of Onset    Heart Disease Mother     Diabetes Father      History   Smoking Status    Never Smoker   Smokeless Tobacco    Never Used      History   Alcohol Use No       HPI        Khoi Mcfarlane is a 63 y.o. female who is here for a normal.   Abdominal: Soft. She exhibits no distension. There is no tenderness. Musculoskeletal: She exhibits no tenderness. No ulcer on foot exam  No calus on foot exam   Neurological: She is alert and oriented to person, place, and time. Normal sensation to 10 grams monofilament testing. Normal pulses in both feet. Skin: Skin is warm. No rash noted. She is not diaphoretic. Psychiatric: Her behavior is normal.              No visits with results within 4 Month(s) from this visit. Latest known visit with results is:   Hospital Outpatient Visit on 04/18/2018   Component Date Value Ref Range Status    Sodium 04/18/2018 143  136 - 145 mmol/L Final    Potassium 04/18/2018 3.7  3.5 - 5.1 mmol/L Final    Chloride 04/18/2018 104  99 - 110 mmol/L Final    CO2 04/18/2018 24  21 - 32 mmol/L Final    Anion Gap 04/18/2018 15  3 - 16 Final    Glucose 04/18/2018 95  70 - 99 mg/dL Final    BUN 04/18/2018 12  7 - 20 mg/dL Final    CREATININE 04/18/2018 <0.5* 0.6 - 1.1 mg/dL Final    GFR Non- 04/18/2018 >60  >60 Final    Comment: >60 mL/min/1.73m2 EGFR, calc. for ages 25 and older using the  MDRD formula (not corrected for weight), is valid for stable  renal function.  GFR  04/18/2018 >60  >60 Final    Comment: Chronic Kidney Disease: less than 60 ml/min/1.73 sq.m. Kidney Failure: less than 15 ml/min/1.73 sq.m. Results valid for patients 18 years and older.       Calcium 04/18/2018 9.8  8.3 - 10.6 mg/dL Final    Total Protein 04/18/2018 6.9  6.4 - 8.2 g/dL Final    Alb 04/18/2018 4.5  3.4 - 5.0 g/dL Final    Albumin/Globulin Ratio 04/18/2018 1.9  1.1 - 2.2 Final    Total Bilirubin 04/18/2018 <0.2  0.0 - 1.0 mg/dL Final    Alkaline Phosphatase 04/18/2018 59  40 - 129 U/L Final    ALT 04/18/2018 13  10 - 40 U/L Final    AST 04/18/2018 15  15 - 37 U/L Final    Globulin 04/18/2018 2.4  g/dL Final    TSH 04/18/2018 1.76  0.27 - 4.20 uIU/mL Final    T4 Free

## 2018-10-03 ENCOUNTER — OFFICE VISIT (OUTPATIENT)
Dept: INTERNAL MEDICINE CLINIC | Age: 58
End: 2018-10-03
Payer: COMMERCIAL

## 2018-10-03 VITALS
WEIGHT: 212.6 LBS | DIASTOLIC BLOOD PRESSURE: 90 MMHG | BODY MASS INDEX: 35.42 KG/M2 | HEIGHT: 65 IN | SYSTOLIC BLOOD PRESSURE: 140 MMHG

## 2018-10-03 DIAGNOSIS — E78.49 OTHER HYPERLIPIDEMIA: ICD-10-CM

## 2018-10-03 DIAGNOSIS — I10 ESSENTIAL HYPERTENSION: ICD-10-CM

## 2018-10-03 DIAGNOSIS — F43.23 SITUATIONAL MIXED ANXIETY AND DEPRESSIVE DISORDER: Primary | ICD-10-CM

## 2018-10-03 DIAGNOSIS — E10.9 TYPE 1 DIABETES MELLITUS WITHOUT COMPLICATION (HCC): ICD-10-CM

## 2018-10-03 PROCEDURE — 99214 OFFICE O/P EST MOD 30 MIN: CPT | Performed by: INTERNAL MEDICINE

## 2018-10-03 RX ORDER — ALPRAZOLAM 1 MG/1
TABLET ORAL
Qty: 90 TABLET | Refills: 0 | OUTPATIENT
Start: 2018-10-03 | End: 2018-12-31 | Stop reason: SDUPTHER

## 2018-10-03 ASSESSMENT — PATIENT HEALTH QUESTIONNAIRE - PHQ9
2. FEELING DOWN, DEPRESSED OR HOPELESS: 0
SUM OF ALL RESPONSES TO PHQ QUESTIONS 1-9: 0
SUM OF ALL RESPONSES TO PHQ9 QUESTIONS 1 & 2: 0
1. LITTLE INTEREST OR PLEASURE IN DOING THINGS: 0
SUM OF ALL RESPONSES TO PHQ QUESTIONS 1-9: 0

## 2018-10-03 NOTE — PROGRESS NOTES
developed  NEUROLOGIC:  Awake and orientated to person, place and time. Sensation normal Motor normal.  PSYCH: Calm affect. SKIN: Warm and dry. HEENT: Sclera non-icteric, normocephalic. EOMI DAMASO  Neck: Supple, no elevation of JVP, normal carotid pulses with no bruits and thyroid normal size. LUNGS:  No increased work of breathing and clear to auscultation, no crackles or wheezing  CARDIOVASCULAR:  Regular rate and rhythm with no murmurs, gallops, rubs, or abnormal heart sounds. The apical impulse not displaced  The carotid upstroke is normal in amplitude and contour without delay or bruit  JVP is not elevated  ABDOMEN:  Normal bowel sounds, non-distended and non-tender to palpation. No liver or spleen enlargement. EXT: No edema, no calf tenderness. Pulses are present bilaterally. DATA:    Lab Results   Component Value Date    ALT 13 04/18/2018    AST 15 04/18/2018    ALKPHOS 59 04/18/2018    BILITOT <0.2 04/18/2018     Lab Results   Component Value Date    CREATININE <0.5 (L) 04/18/2018    BUN 12 04/18/2018     04/18/2018    K 3.7 04/18/2018     04/18/2018    CO2 24 04/18/2018     Lab Results   Component Value Date    TSH 1.76 04/18/2018    O8VGERJ 6.7 05/03/2016     Lab Results   Component Value Date    WBC 2.7 (L) 03/12/2018    HGB 11.8 (L) 03/12/2018    HCT 35.4 (L) 03/12/2018    MCV 93.1 03/12/2018     (L) 03/12/2018       Assessment Plan : 1. Anxiety        On Alprazolam daily with control of anxiety     2. Diabetes          She sees endocrinologist with control of glucose    3. Depression            She is also on Sertraline for treatment of depression  4     Hyperlipidemia         Remains on statin       No c/o muscle aches        No chest pain  Last lipid panel was 75  Discussed healthy diet and lifestyle      Irish Orozco MD  I attest that this note was completed entirely by me

## 2018-12-31 ENCOUNTER — OFFICE VISIT (OUTPATIENT)
Dept: INTERNAL MEDICINE CLINIC | Age: 58
End: 2018-12-31
Payer: COMMERCIAL

## 2018-12-31 VITALS
SYSTOLIC BLOOD PRESSURE: 136 MMHG | BODY MASS INDEX: 34.82 KG/M2 | HEIGHT: 65 IN | DIASTOLIC BLOOD PRESSURE: 82 MMHG | WEIGHT: 209 LBS

## 2018-12-31 DIAGNOSIS — E78.49 OTHER HYPERLIPIDEMIA: ICD-10-CM

## 2018-12-31 DIAGNOSIS — I10 ESSENTIAL HYPERTENSION: ICD-10-CM

## 2018-12-31 DIAGNOSIS — F43.23 SITUATIONAL MIXED ANXIETY AND DEPRESSIVE DISORDER: Primary | ICD-10-CM

## 2018-12-31 DIAGNOSIS — E10.9 TYPE 1 DIABETES MELLITUS WITHOUT COMPLICATION (HCC): ICD-10-CM

## 2018-12-31 PROCEDURE — 99214 OFFICE O/P EST MOD 30 MIN: CPT | Performed by: INTERNAL MEDICINE

## 2018-12-31 RX ORDER — ALPRAZOLAM 1 MG/1
TABLET ORAL
Qty: 90 TABLET | Refills: 0 | OUTPATIENT
Start: 2018-12-31 | End: 2019-03-21 | Stop reason: SDUPTHER

## 2019-01-02 PROBLEM — Z79.4 TYPE 2 DIABETES MELLITUS WITHOUT COMPLICATION, WITH LONG-TERM CURRENT USE OF INSULIN (HCC): Status: ACTIVE | Noted: 2019-01-02

## 2019-01-02 PROBLEM — E11.9 TYPE 2 DIABETES MELLITUS WITHOUT COMPLICATION, WITH LONG-TERM CURRENT USE OF INSULIN (HCC): Status: ACTIVE | Noted: 2019-01-02

## 2019-01-16 ENCOUNTER — OFFICE VISIT (OUTPATIENT)
Dept: ENDOCRINOLOGY | Age: 59
End: 2019-01-16
Payer: COMMERCIAL

## 2019-01-16 VITALS
OXYGEN SATURATION: 98 % | BODY MASS INDEX: 35.09 KG/M2 | HEIGHT: 65 IN | SYSTOLIC BLOOD PRESSURE: 118 MMHG | WEIGHT: 210.6 LBS | HEART RATE: 94 BPM | RESPIRATION RATE: 18 BRPM | DIASTOLIC BLOOD PRESSURE: 68 MMHG

## 2019-01-16 DIAGNOSIS — I10 ESSENTIAL HYPERTENSION: ICD-10-CM

## 2019-01-16 DIAGNOSIS — E78.49 OTHER HYPERLIPIDEMIA: ICD-10-CM

## 2019-01-16 DIAGNOSIS — E10.9 TYPE 1 DIABETES MELLITUS WITHOUT COMPLICATION (HCC): Primary | ICD-10-CM

## 2019-01-16 DIAGNOSIS — E55.9 VITAMIN D DEFICIENCY: ICD-10-CM

## 2019-01-16 LAB — HBA1C MFR BLD: 12.5 %

## 2019-01-16 PROCEDURE — 83036 HEMOGLOBIN GLYCOSYLATED A1C: CPT | Performed by: INTERNAL MEDICINE

## 2019-01-16 PROCEDURE — 95250 CONT GLUC MNTR PHYS/QHP EQP: CPT | Performed by: INTERNAL MEDICINE

## 2019-01-16 PROCEDURE — 99214 OFFICE O/P EST MOD 30 MIN: CPT | Performed by: INTERNAL MEDICINE

## 2019-02-05 RX ORDER — PIOGLITAZONEHYDROCHLORIDE 15 MG/1
TABLET ORAL
Qty: 90 TABLET | Refills: 3 | Status: SHIPPED | OUTPATIENT
Start: 2019-02-05 | End: 2019-09-26

## 2019-02-05 RX ORDER — RAMIPRIL 10 MG/1
CAPSULE ORAL
Qty: 90 CAPSULE | Refills: 3 | Status: SHIPPED | OUTPATIENT
Start: 2019-02-05 | End: 2020-01-30 | Stop reason: SDUPTHER

## 2019-03-21 ENCOUNTER — OFFICE VISIT (OUTPATIENT)
Dept: INTERNAL MEDICINE CLINIC | Age: 59
End: 2019-03-21
Payer: COMMERCIAL

## 2019-03-21 VITALS
WEIGHT: 209 LBS | DIASTOLIC BLOOD PRESSURE: 80 MMHG | SYSTOLIC BLOOD PRESSURE: 136 MMHG | BODY MASS INDEX: 35.68 KG/M2 | HEIGHT: 64 IN

## 2019-03-21 DIAGNOSIS — F43.23 SITUATIONAL MIXED ANXIETY AND DEPRESSIVE DISORDER: Primary | ICD-10-CM

## 2019-03-21 DIAGNOSIS — Z79.4 TYPE 2 DIABETES MELLITUS WITHOUT COMPLICATION, WITH LONG-TERM CURRENT USE OF INSULIN (HCC): ICD-10-CM

## 2019-03-21 DIAGNOSIS — I10 ESSENTIAL HYPERTENSION: ICD-10-CM

## 2019-03-21 DIAGNOSIS — E78.2 MIXED HYPERLIPIDEMIA: ICD-10-CM

## 2019-03-21 DIAGNOSIS — E11.9 TYPE 2 DIABETES MELLITUS WITHOUT COMPLICATION, WITH LONG-TERM CURRENT USE OF INSULIN (HCC): ICD-10-CM

## 2019-03-21 PROCEDURE — 99214 OFFICE O/P EST MOD 30 MIN: CPT | Performed by: INTERNAL MEDICINE

## 2019-03-21 RX ORDER — ALPRAZOLAM 1 MG/1
TABLET ORAL
Qty: 90 TABLET | Refills: 0 | Status: SHIPPED | OUTPATIENT
Start: 2019-03-21 | End: 2019-06-20 | Stop reason: SDUPTHER

## 2019-03-21 ASSESSMENT — PATIENT HEALTH QUESTIONNAIRE - PHQ9
SUM OF ALL RESPONSES TO PHQ QUESTIONS 1-9: 0
SUM OF ALL RESPONSES TO PHQ9 QUESTIONS 1 & 2: 0
1. LITTLE INTEREST OR PLEASURE IN DOING THINGS: 0
2. FEELING DOWN, DEPRESSED OR HOPELESS: 0
SUM OF ALL RESPONSES TO PHQ QUESTIONS 1-9: 0

## 2019-04-10 ENCOUNTER — HOSPITAL ENCOUNTER (OUTPATIENT)
Age: 59
Discharge: HOME | End: 2019-04-10
Payer: COMMERCIAL

## 2019-04-10 DIAGNOSIS — E10.9 TYPE 1 DIABETES MELLITUS WITHOUT COMPLICATION (HCC): ICD-10-CM

## 2019-04-10 DIAGNOSIS — E78.49 OTHER HYPERLIPIDEMIA: ICD-10-CM

## 2019-04-10 DIAGNOSIS — F43.23 SITUATIONAL MIXED ANXIETY AND DEPRESSIVE DISORDER: ICD-10-CM

## 2019-04-10 DIAGNOSIS — I10 ESSENTIAL HYPERTENSION: ICD-10-CM

## 2019-04-10 DIAGNOSIS — E11.9 TYPE 2 DIABETES MELLITUS WITHOUT COMPLICATION, WITH LONG-TERM CURRENT USE OF INSULIN (HCC): ICD-10-CM

## 2019-04-10 DIAGNOSIS — Z79.4 TYPE 2 DIABETES MELLITUS WITHOUT COMPLICATION, WITH LONG-TERM CURRENT USE OF INSULIN (HCC): ICD-10-CM

## 2019-04-10 LAB
A/G RATIO: 1.5 (ref 1.1–2.2)
ALBUMIN SERPL-MCNC: 4 G/DL (ref 3.4–5)
ALP BLD-CCNC: 67 U/L (ref 40–129)
ALT SERPL-CCNC: 14 U/L (ref 10–40)
ANION GAP SERPL CALCULATED.3IONS-SCNC: 12 MMOL/L (ref 3–16)
AST SERPL-CCNC: 16 U/L (ref 15–37)
BASOPHILS ABSOLUTE: 0 K/UL (ref 0–0.2)
BASOPHILS RELATIVE PERCENT: 0.8 %
BILIRUB SERPL-MCNC: 0.4 MG/DL (ref 0–1)
BUN BLDV-MCNC: 20 MG/DL (ref 7–20)
CALCIUM SERPL-MCNC: 9.5 MG/DL (ref 8.3–10.6)
CHLORIDE BLD-SCNC: 103 MMOL/L (ref 99–110)
CHOLESTEROL, TOTAL: 161 MG/DL (ref 0–199)
CO2: 26 MMOL/L (ref 21–32)
CREAT SERPL-MCNC: 0.5 MG/DL (ref 0.6–1.1)
CREATININE URINE: 144.3 MG/DL (ref 28–259)
EOSINOPHILS ABSOLUTE: 0.4 K/UL (ref 0–0.6)
EOSINOPHILS RELATIVE PERCENT: 9.2 %
ESTIMATED AVERAGE GLUCOSE: 254.7 MG/DL
GFR AFRICAN AMERICAN: >60
GFR NON-AFRICAN AMERICAN: >60
GLOBULIN: 2.7 G/DL
GLUCOSE BLD-MCNC: 77 MG/DL (ref 70–99)
HBA1C MFR BLD: 10.5 %
HCT VFR BLD CALC: 37.2 % (ref 36–48)
HDLC SERPL-MCNC: 85 MG/DL (ref 40–60)
HEMOGLOBIN: 12.3 G/DL (ref 12–16)
LDL CHOLESTEROL CALCULATED: 69 MG/DL
LYMPHOCYTES ABSOLUTE: 1.4 K/UL (ref 1–5.1)
LYMPHOCYTES RELATIVE PERCENT: 31.7 %
MCH RBC QN AUTO: 30.8 PG (ref 26–34)
MCHC RBC AUTO-ENTMCNC: 33.1 G/DL (ref 31–36)
MCV RBC AUTO: 92.9 FL (ref 80–100)
MICROALBUMIN UR-MCNC: 7 MG/DL
MICROALBUMIN/CREAT UR-RTO: 48.5 MG/G (ref 0–30)
MONOCYTES ABSOLUTE: 0.3 K/UL (ref 0–1.3)
MONOCYTES RELATIVE PERCENT: 7.9 %
NEUTROPHILS ABSOLUTE: 2.2 K/UL (ref 1.7–7.7)
NEUTROPHILS RELATIVE PERCENT: 50.4 %
PDW BLD-RTO: 13.3 % (ref 12.4–15.4)
PLATELET # BLD: 192 K/UL (ref 135–450)
PMV BLD AUTO: 9.2 FL (ref 5–10.5)
POTASSIUM SERPL-SCNC: 4.6 MMOL/L (ref 3.5–5.1)
RBC # BLD: 4 M/UL (ref 4–5.2)
SODIUM BLD-SCNC: 141 MMOL/L (ref 136–145)
TOTAL PROTEIN: 6.7 G/DL (ref 6.4–8.2)
TRIGL SERPL-MCNC: 35 MG/DL (ref 0–150)
TSH REFLEX: 2.42 UIU/ML (ref 0.27–4.2)
VLDLC SERPL CALC-MCNC: 7 MG/DL
WBC # BLD: 4.4 K/UL (ref 4–11)

## 2019-04-10 PROCEDURE — 82570 ASSAY OF URINE CREATININE: CPT

## 2019-04-10 PROCEDURE — 83036 HEMOGLOBIN GLYCOSYLATED A1C: CPT

## 2019-04-10 PROCEDURE — 36415 COLL VENOUS BLD VENIPUNCTURE: CPT

## 2019-04-10 PROCEDURE — 85025 COMPLETE CBC W/AUTO DIFF WBC: CPT

## 2019-04-10 PROCEDURE — 80053 COMPREHEN METABOLIC PANEL: CPT

## 2019-04-10 PROCEDURE — 80061 LIPID PANEL: CPT

## 2019-04-10 PROCEDURE — 84443 ASSAY THYROID STIM HORMONE: CPT

## 2019-04-10 PROCEDURE — 82043 UR ALBUMIN QUANTITATIVE: CPT

## 2019-04-17 ENCOUNTER — OFFICE VISIT (OUTPATIENT)
Dept: ENDOCRINOLOGY | Age: 59
End: 2019-04-17
Payer: COMMERCIAL

## 2019-04-17 VITALS
WEIGHT: 217.6 LBS | DIASTOLIC BLOOD PRESSURE: 70 MMHG | OXYGEN SATURATION: 98 % | HEIGHT: 64 IN | SYSTOLIC BLOOD PRESSURE: 130 MMHG | HEART RATE: 89 BPM | BODY MASS INDEX: 37.15 KG/M2 | RESPIRATION RATE: 16 BRPM

## 2019-04-17 DIAGNOSIS — E10.9 TYPE 1 DIABETES MELLITUS WITHOUT COMPLICATION (HCC): Primary | ICD-10-CM

## 2019-04-17 DIAGNOSIS — E78.49 OTHER HYPERLIPIDEMIA: ICD-10-CM

## 2019-04-17 DIAGNOSIS — E55.9 VITAMIN D DEFICIENCY: ICD-10-CM

## 2019-04-17 DIAGNOSIS — I10 ESSENTIAL HYPERTENSION: ICD-10-CM

## 2019-04-17 PROCEDURE — 99214 OFFICE O/P EST MOD 30 MIN: CPT | Performed by: INTERNAL MEDICINE

## 2019-04-17 PROCEDURE — 95251 CONT GLUC MNTR ANALYSIS I&R: CPT | Performed by: INTERNAL MEDICINE

## 2019-04-17 NOTE — PROGRESS NOTES
uncontrolled DM. PCP   Dusty Noyola MD      Was previously seeing Dr. Feliz Cool at the [facility]. Patient has a PMH of  DM, hypertension, hyperlipidemia, obesity     Diagnosed with Diabetes Mellitus  in 2001 after she had pancreatitis ( viral ). Course has been variable . Microvascular complications: No known retinopathy (Last eye exam: 2016), No nephropathy . No Peripheral neuropathy    Home regimen:  Metformin 850 mg TID                                                          Actos 15 mg daily. Lantus insulin 25 units QHS                          Humalog 6 units before breakfast, lunch , 6 units        FBS   Lunch 150-200  Dinner 100-150  Bedtime 200-250    Hyperlipidemia: She has mixed hyperlipidemia  On Atorvastatin 20 mg daily, lopid 600 mg BID, fish oil 1 gram TID      Hypertension: She has HTN  On Ramipril 10 mg daily. Review of Systems   Constitutional: Positive for malaise/fatigue. Negative for weight loss. HENT: Negative for sore throat. Eyes: Negative for blurred vision. Respiratory: Negative for cough and shortness of breath. Cardiovascular: Negative for chest pain and palpitations. Gastrointestinal: Negative for abdominal pain, heartburn, nausea and vomiting. Genitourinary: Negative for frequency and urgency. Musculoskeletal: Positive for joint pain. Negative for back pain and myalgias. Skin: Negative for rash. Neurological: Positive for tingling and sensory change. Negative for headaches. Endo/Heme/Allergies: Negative for polydipsia. Psychiatric/Behavioral: Negative for depression. The patient is not nervous/anxious. Physical Exam   Constitutional: She is oriented to person, place, and time. She appears well-developed and well-nourished. HENT:   Head: Normocephalic. Mouth/Throat: Oropharynx is clear and moist.   Eyes: EOM are normal. Right eye exhibits no discharge. Neck: No thyromegaly present. Cardiovascular: Normal rate and normal heart sounds. Pulmonary/Chest: Effort normal and breath sounds normal.   Abdominal: Soft. She exhibits no distension. There is no tenderness. Musculoskeletal: She exhibits no tenderness. No ulcer on foot exam  No calus on foot exam   Neurological: She is alert and oriented to person, place, and time. Normal sensation to 10 grams monofilament testing. Normal pulses in both feet. Skin: Skin is warm. No rash noted. She is not diaphoretic. Psychiatric: Her behavior is normal.              Hospital Outpatient Visit on 04/10/2019   Component Date Value Ref Range Status    Sodium 04/10/2019 141  136 - 145 mmol/L Final    Potassium 04/10/2019 4.6  3.5 - 5.1 mmol/L Final    Chloride 04/10/2019 103  99 - 110 mmol/L Final    CO2 04/10/2019 26  21 - 32 mmol/L Final    Anion Gap 04/10/2019 12  3 - 16 Final    Glucose 04/10/2019 77  70 - 99 mg/dL Final    BUN 04/10/2019 20  7 - 20 mg/dL Final    CREATININE 04/10/2019 0.5* 0.6 - 1.1 mg/dL Final    GFR Non- 04/10/2019 >60  >60 Final    Comment: >60 mL/min/1.73m2 EGFR, calc. for ages 25 and older using the  MDRD formula (not corrected for weight), is valid for stable  renal function.  GFR  04/10/2019 >60  >60 Final    Comment: Chronic Kidney Disease: less than 60 ml/min/1.73 sq.m. Kidney Failure: less than 15 ml/min/1.73 sq.m. Results valid for patients 18 years and older.       Calcium 04/10/2019 9.5  8.3 - 10.6 mg/dL Final    Total Protein 04/10/2019 6.7  6.4 - 8.2 g/dL Final    Alb 04/10/2019 4.0  3.4 - 5.0 g/dL Final    Albumin/Globulin Ratio 04/10/2019 1.5  1.1 - 2.2 Final    Total Bilirubin 04/10/2019 0.4  0.0 - 1.0 mg/dL Final    Alkaline Phosphatase 04/10/2019 67  40 - 129 U/L Final    ALT 04/10/2019 14  10 - 40 U/L Final    AST 04/10/2019 16  15 - 37 U/L Final    Globulin 04/10/2019 2.7  g/dL Final    Cholesterol, Total 04/10/2019 161  0 - 199 mg/dL Final    Triglycerides 04/10/2019 35  0 - 150 mg/dL Final    HDL 04/10/2019 85* 40 - 60 mg/dL Final    LDL Calculated 04/10/2019 69  <100 mg/dL Final    VLDL Cholesterol Calculated 04/10/2019 7  Not Established mg/dL Final    Microalbumin, Random Urine 04/10/2019 7.00* <2.0 mg/dL Final    Creatinine, Ur 04/10/2019 144.3  28.0 - 259.0 mg/dL Final    Microalbumin Creatinine Ratio 04/10/2019 48.5* 0.0 - 30.0 mg/g Final    Hemoglobin A1C 04/10/2019 10.5  See comment % Final    Comment: Comment:  Diagnosis of Diabetes: > or = 6.5%  Increased risk of diabetes (Prediabetes): 5.7-6.4%  Glycemic Control: Nonpregnant Adults: <7.0%                    Pregnant: <6.0%        eAG 04/10/2019 254.7  mg/dL Final    TSH 04/10/2019 2.42  0.27 - 4.20 uIU/mL Final    WBC 04/10/2019 4.4  4.0 - 11.0 K/uL Final    RBC 04/10/2019 4.00  4.00 - 5.20 M/uL Final    Hemoglobin 04/10/2019 12.3  12.0 - 16.0 g/dL Final    Hematocrit 04/10/2019 37.2  36.0 - 48.0 % Final    MCV 04/10/2019 92.9  80.0 - 100.0 fL Final    MCH 04/10/2019 30.8  26.0 - 34.0 pg Final    MCHC 04/10/2019 33.1  31.0 - 36.0 g/dL Final    RDW 04/10/2019 13.3  12.4 - 15.4 % Final    Platelets 67/56/6029 192  135 - 450 K/uL Final    MPV 04/10/2019 9.2  5.0 - 10.5 fL Final    Neutrophils % 04/10/2019 50.4  % Final    Lymphocytes % 04/10/2019 31.7  % Final    Monocytes % 04/10/2019 7.9  % Final    Eosinophils % 04/10/2019 9.2  % Final    Basophils % 04/10/2019 0.8  % Final    Neutrophils # 04/10/2019 2.2  1.7 - 7.7 K/uL Final    Lymphocytes # 04/10/2019 1.4  1.0 - 5.1 K/uL Final    Monocytes # 04/10/2019 0.3  0.0 - 1.3 K/uL Final    Eosinophils # 04/10/2019 0.4  0.0 - 0.6 K/uL Final    Basophils # 04/10/2019 0.0  0.0 - 0.2 K/uL Final   Office Visit on 01/16/2019   Component Date Value Ref Range Status    Hemoglobin A1C 01/16/2019 12.5  % Corrected         Assessment/Plan      1.   DM     Edward Marie is a 63 y.o. female has DM with obesity and insulin resistance. A1c 10.4 % ---> 7.9 %---> 11.7 % ---->10.9 %---> 9.1 %  ---> 7.6 % ---> 12.5 % ---> 10.5 %     She had pancreatitis in the past.  c-peptide is low  . Fasting sugars have been low normal.     -Continue metformin  -Continue actos  -Decrease Lantus insulin 20 units QHS  - Use humalog 6 units with breakfast , lunch and 8 units dinner       Discussed the need to test BS 2-3 times a day. Advised follow-up with the ophthalmologist once year. Urine microalbumin/cr ratio normal in 10/16. High in 04/19. On Altace. Discussed foot care. ASA 81 mg daily. TSH was normal in 10/16, 04/19      2. Hypertension. BP at goal.     3. Hyperlipidemia. On statins and fibrates. Labs in 10/17 , 04/19  HDL 77---> 85  LDL 75---> 69  TGD 64---> 69---> 35      4. Vitamin D def. On replacement. Vit D 62.9 in 04/18    5. Vit B12 def. On replacement. Patient had CGMS done from  01/16/19-01/25/19    Nocturnal blood glucose pattern:  Episodes hyperglycemia      Post-Prandial BS pattern:    BS high after dinner      Overall Impression. BS high after diner and in fasting. Recommendations: Will increase prandial coverage with dinner.

## 2019-05-13 ENCOUNTER — TELEPHONE (OUTPATIENT)
Dept: ENDOCRINOLOGY | Age: 59
End: 2019-05-13

## 2019-05-13 RX ORDER — ATORVASTATIN CALCIUM 20 MG/1
TABLET, FILM COATED ORAL
Qty: 90 TABLET | Refills: 3 | Status: SHIPPED | OUTPATIENT
Start: 2019-05-13 | End: 2020-02-04 | Stop reason: SDUPTHER

## 2019-05-13 RX ORDER — GEMFIBROZIL 600 MG/1
TABLET, FILM COATED ORAL
Qty: 180 TABLET | Refills: 3 | Status: SHIPPED | OUTPATIENT
Start: 2019-05-13 | End: 2020-02-04 | Stop reason: SDUPTHER

## 2019-05-13 NOTE — TELEPHONE ENCOUNTER
Patient called wanting to know why she was charged $69. I advised patient to call billing for more information, billing could not help this patient or tell her what it is for. I saw that next to this charge it said it was for CGM analysis. Patient would like a call back about why she is being charged for this amount.

## 2019-05-13 NOTE — TELEPHONE ENCOUNTER
Please advise. Message.      LOV:4/17/2019     NOV: 7/24/19    DOSE:     Frequency:     Pharmacy as Pended:     Per LOV Note:     Per Last PHONE NOTE:     Lab Results   Component Value Date    LABA1C 10.5 04/10/2019       Lab Results   Component Value Date    TSH 1.76 04/18/2018    B7IMMJP 6.7 05/03/2016    T4FREE 1.2 04/18/2018

## 2019-06-20 DIAGNOSIS — F43.23 SITUATIONAL MIXED ANXIETY AND DEPRESSIVE DISORDER: ICD-10-CM

## 2019-06-20 RX ORDER — ALPRAZOLAM 1 MG/1
TABLET ORAL
Qty: 90 TABLET | Refills: 0 | Status: SHIPPED | OUTPATIENT
Start: 2019-06-20 | End: 2019-09-26 | Stop reason: SDUPTHER

## 2019-07-01 ENCOUNTER — OFFICE VISIT (OUTPATIENT)
Dept: INTERNAL MEDICINE CLINIC | Age: 59
End: 2019-07-01
Payer: COMMERCIAL

## 2019-07-01 VITALS
SYSTOLIC BLOOD PRESSURE: 140 MMHG | BODY MASS INDEX: 36.19 KG/M2 | WEIGHT: 212 LBS | DIASTOLIC BLOOD PRESSURE: 80 MMHG | HEIGHT: 64 IN

## 2019-07-01 DIAGNOSIS — I10 ESSENTIAL HYPERTENSION: ICD-10-CM

## 2019-07-01 DIAGNOSIS — F43.23 SITUATIONAL MIXED ANXIETY AND DEPRESSIVE DISORDER: Primary | ICD-10-CM

## 2019-07-01 PROCEDURE — 99213 OFFICE O/P EST LOW 20 MIN: CPT | Performed by: INTERNAL MEDICINE

## 2019-07-01 ASSESSMENT — PATIENT HEALTH QUESTIONNAIRE - PHQ9
SUM OF ALL RESPONSES TO PHQ QUESTIONS 1-9: 0
SUM OF ALL RESPONSES TO PHQ QUESTIONS 1-9: 0
2. FEELING DOWN, DEPRESSED OR HOPELESS: 0
SUM OF ALL RESPONSES TO PHQ9 QUESTIONS 1 & 2: 0
1. LITTLE INTEREST OR PLEASURE IN DOING THINGS: 0

## 2019-07-01 NOTE — PROGRESS NOTES
Yes Marline Lala MD   [facility] DELICA LANCETS 13K MISC 1 each by Does not apply route 3 times daily (before meals) Yes Marline Lala MD   Blood Glucose Monitoring Suppl (ONE TOUCH ULTRA 2) W/DEVICE KIT 1 kit by Does not apply route daily as needed (Test BS 3 times a day.) Yes Marline Lala MD   Glucosamine-Chondroitin (GLUCOSAMINE CHONDR COMPLEX PO) Take by mouth daily Yes Historical Provider, MD   Cholecalciferol (CVS VIT D 5000 HIGH-POTENCY) 5000 UNITS CAPS capsule All patients should take 5000 IU or more Vitamin D every day for good general health. There are myriad benefits to higher Vitamin D levels. Women should also take Calcium 600 mg twice a day for bone health. Yes Brittany Peñaloza MD   cyanocobalamin (CVS VITAMIN B12) 1000 MCG tablet Everyone should take OTC B12 1000 mcg twice a day for neurological health. High B12 levels help prevent dementia. Yes Brittany Peñaloza MD   fish oil-omega-3 fatty acids 1000 MG capsule Take 1 g by mouth three times daily. Yes Historical Provider, MD        Past Medical History:   Diagnosis Date    Allergic rhinitis     Asthma     H/O- stress related    Diabetes mellitus, type 1     History of blood transfusion 2001?     Hyperlipidemia     Other acute reactions to stress     Pancreatitis 2000    Rectal abscess     II       Past Surgical History:   Procedure Laterality Date    COLONOSCOPY      HYSTERECTOMY      KNEE ARTHROSCOPY Right 2014 x2    Dr. Hans Brown ARTHROSCOPY Left 2015    OTHER SURGICAL HISTORY  6/1/16    EUS, no interventions    OTHER SURGICAL HISTORY  6/2/16    EXAMINATION UNDER ANESTHESIA, EXCISION OF ANAL MASS    OVARY REMOVAL Bilateral        Social History     Tobacco Use    Smoking status: Never Smoker    Smokeless tobacco: Never Used   Substance Use Topics    Alcohol use: No        Family History   Problem Relation Age of Onset    Heart Disease Mother     Diabetes Father        Vitals:    07/01/19 1003 07/01/19 1007

## 2019-09-11 ENCOUNTER — TELEPHONE (OUTPATIENT)
Dept: ENDOCRINOLOGY | Age: 59
End: 2019-09-11

## 2019-09-11 NOTE — TELEPHONE ENCOUNTER
Spoke to her  She is seeing the patient for weight loss.    She ll be starting low calorie diet    Will hold Humalog  DC actos  Reduce Lantus to 10 units daily

## 2019-09-12 RX ORDER — INSULIN GLARGINE 100 [IU]/ML
INJECTION, SOLUTION SUBCUTANEOUS
Qty: 5 PEN | Refills: 4 | Status: SHIPPED
Start: 2019-09-12 | End: 2020-09-14 | Stop reason: DRUGHIGH

## 2019-09-13 ENCOUNTER — HOSPITAL ENCOUNTER (OUTPATIENT)
Dept: MAMMOGRAPHY | Age: 59
Discharge: HOME | End: 2019-09-13
Payer: COMMERCIAL

## 2019-09-13 DIAGNOSIS — Z12.31 VISIT FOR SCREENING MAMMOGRAM: ICD-10-CM

## 2019-09-13 PROCEDURE — 77063 BREAST TOMOSYNTHESIS BI: CPT

## 2019-09-26 ENCOUNTER — OFFICE VISIT (OUTPATIENT)
Dept: INTERNAL MEDICINE CLINIC | Age: 59
End: 2019-09-26
Payer: COMMERCIAL

## 2019-09-26 VITALS
SYSTOLIC BLOOD PRESSURE: 140 MMHG | WEIGHT: 204 LBS | BODY MASS INDEX: 34.83 KG/M2 | DIASTOLIC BLOOD PRESSURE: 80 MMHG | HEIGHT: 64 IN

## 2019-09-26 DIAGNOSIS — F43.23 SITUATIONAL MIXED ANXIETY AND DEPRESSIVE DISORDER: Primary | ICD-10-CM

## 2019-09-26 DIAGNOSIS — I10 ESSENTIAL HYPERTENSION: ICD-10-CM

## 2019-09-26 PROCEDURE — 99213 OFFICE O/P EST LOW 20 MIN: CPT | Performed by: INTERNAL MEDICINE

## 2019-09-26 RX ORDER — ALPRAZOLAM 1 MG/1
TABLET ORAL
Qty: 90 TABLET | Refills: 0 | Status: SHIPPED | OUTPATIENT
Start: 2019-09-26 | End: 2019-12-19 | Stop reason: SDUPTHER

## 2019-09-26 NOTE — PROGRESS NOTES
Needle (B-D UF III MINI PEN NEEDLES) 31G X 5 MM MISC Inject insulin 4 times a day Yes Leti Mccracken MD   [facility] LANCETS 64Q MISC 1 each by Does not apply route 3 times daily (before meals) Yes Leti Mccracken MD   Blood Glucose Monitoring Suppl (ONE TOUCH ULTRA 2) W/DEVICE KIT 1 kit by Does not apply route daily as needed (Test BS 3 times a day.) Yes Leti Mccracken MD   Glucosamine-Chondroitin (GLUCOSAMINE CHONDR COMPLEX PO) Take by mouth daily Yes Historical Provider, MD   Cholecalciferol (CVS VIT D 5000 HIGH-POTENCY) 5000 UNITS CAPS capsule All patients should take 5000 IU or more Vitamin D every day for good general health. There are myriad benefits to higher Vitamin D levels. Women should also take Calcium 600 mg twice a day for bone health. Yes German Castelan MD   cyanocobalamin (CVS VITAMIN B12) 1000 MCG tablet Everyone should take OTC B12 1000 mcg twice a day for neurological health. High B12 levels help prevent dementia. Yes German Castelan MD   fish oil-omega-3 fatty acids 1000 MG capsule Take 1 g by mouth three times daily. Yes Historical Provider, MD        Past Medical History:   Diagnosis Date    Allergic rhinitis     Asthma     H/O- stress related    Diabetes mellitus, type 1     History of blood transfusion 2001?     Hyperlipidemia     Other acute reactions to stress     Pancreatitis 2000    Rectal abscess     II       Past Surgical History:   Procedure Laterality Date    COLONOSCOPY      HYSTERECTOMY      KNEE ARTHROSCOPY Right 2014 x2    Dr. Porter Hernandez ARTHROSCOPY Left 2015    OTHER SURGICAL HISTORY  6/1/16    EUS, no interventions    OTHER SURGICAL HISTORY  6/2/16    EXAMINATION UNDER ANESTHESIA, EXCISION OF ANAL MASS    OVARY REMOVAL Bilateral        Social History     Tobacco Use    Smoking status: Never Smoker    Smokeless tobacco: Never Used   Substance Use Topics    Alcohol use: No        Family History   Problem Relation Age of Onset    Heart

## 2019-11-25 ENCOUNTER — OFFICE VISIT (OUTPATIENT)
Dept: ENDOCRINOLOGY | Age: 59
End: 2019-11-25
Payer: COMMERCIAL

## 2019-11-25 VITALS
HEIGHT: 64 IN | SYSTOLIC BLOOD PRESSURE: 119 MMHG | OXYGEN SATURATION: 98 % | DIASTOLIC BLOOD PRESSURE: 79 MMHG | HEART RATE: 101 BPM | BODY MASS INDEX: 33.43 KG/M2 | WEIGHT: 195.8 LBS

## 2019-11-25 DIAGNOSIS — E78.49 OTHER HYPERLIPIDEMIA: ICD-10-CM

## 2019-11-25 DIAGNOSIS — I10 ESSENTIAL HYPERTENSION: ICD-10-CM

## 2019-11-25 DIAGNOSIS — E55.9 VITAMIN D DEFICIENCY: ICD-10-CM

## 2019-11-25 DIAGNOSIS — Z79.4 TYPE 2 DIABETES MELLITUS WITHOUT COMPLICATION, WITH LONG-TERM CURRENT USE OF INSULIN (HCC): Primary | ICD-10-CM

## 2019-11-25 DIAGNOSIS — E11.9 TYPE 2 DIABETES MELLITUS WITHOUT COMPLICATION, WITH LONG-TERM CURRENT USE OF INSULIN (HCC): Primary | ICD-10-CM

## 2019-11-25 LAB — HBA1C MFR BLD: 7.3 %

## 2019-11-25 PROCEDURE — 83036 HEMOGLOBIN GLYCOSYLATED A1C: CPT | Performed by: INTERNAL MEDICINE

## 2019-11-25 PROCEDURE — 99214 OFFICE O/P EST MOD 30 MIN: CPT | Performed by: INTERNAL MEDICINE

## 2019-12-19 ENCOUNTER — OFFICE VISIT (OUTPATIENT)
Dept: INTERNAL MEDICINE CLINIC | Age: 59
End: 2019-12-19
Payer: COMMERCIAL

## 2019-12-19 VITALS
HEIGHT: 64 IN | SYSTOLIC BLOOD PRESSURE: 120 MMHG | WEIGHT: 193 LBS | DIASTOLIC BLOOD PRESSURE: 70 MMHG | BODY MASS INDEX: 32.95 KG/M2

## 2019-12-19 DIAGNOSIS — F43.23 SITUATIONAL MIXED ANXIETY AND DEPRESSIVE DISORDER: Primary | ICD-10-CM

## 2019-12-19 DIAGNOSIS — E66.09 CLASS 1 OBESITY DUE TO EXCESS CALORIES WITH SERIOUS COMORBIDITY AND BODY MASS INDEX (BMI) OF 33.0 TO 33.9 IN ADULT: ICD-10-CM

## 2019-12-19 PROBLEM — E66.811 CLASS 1 OBESITY DUE TO EXCESS CALORIES WITH SERIOUS COMORBIDITY AND BODY MASS INDEX (BMI) OF 33.0 TO 33.9 IN ADULT: Status: ACTIVE | Noted: 2019-12-19

## 2019-12-19 PROCEDURE — 99213 OFFICE O/P EST LOW 20 MIN: CPT | Performed by: INTERNAL MEDICINE

## 2019-12-19 RX ORDER — ALPRAZOLAM 1 MG/1
TABLET ORAL
Qty: 90 TABLET | Refills: 0 | Status: SHIPPED | OUTPATIENT
Start: 2019-12-19 | End: 2019-12-19

## 2019-12-19 RX ORDER — ALPRAZOLAM 1 MG/1
1 TABLET ORAL NIGHTLY PRN
Qty: 90 TABLET | Refills: 0 | Status: SHIPPED | OUTPATIENT
Start: 2019-12-19 | End: 2020-03-12 | Stop reason: SDUPTHER

## 2019-12-19 ASSESSMENT — PATIENT HEALTH QUESTIONNAIRE - PHQ9
SUM OF ALL RESPONSES TO PHQ9 QUESTIONS 1 & 2: 0
1. LITTLE INTEREST OR PLEASURE IN DOING THINGS: 0
SUM OF ALL RESPONSES TO PHQ QUESTIONS 1-9: 0
SUM OF ALL RESPONSES TO PHQ QUESTIONS 1-9: 0
2. FEELING DOWN, DEPRESSED OR HOPELESS: 0

## 2020-01-14 ENCOUNTER — TELEPHONE (OUTPATIENT)
Dept: INTERNAL MEDICINE CLINIC | Age: 60
End: 2020-01-14

## 2020-01-22 ENCOUNTER — HOSPITAL ENCOUNTER (OUTPATIENT)
Age: 60
Discharge: HOME | End: 2020-01-22
Payer: COMMERCIAL

## 2020-01-22 LAB
A/G RATIO: 1.9 (ref 1.1–2.2)
ALBUMIN SERPL-MCNC: 4.6 G/DL (ref 3.4–5)
ALP BLD-CCNC: 54 U/L (ref 40–129)
ALT SERPL-CCNC: 18 U/L (ref 10–40)
ANION GAP SERPL CALCULATED.3IONS-SCNC: 16 MMOL/L (ref 3–16)
AST SERPL-CCNC: 19 U/L (ref 15–37)
BILIRUB SERPL-MCNC: 0.4 MG/DL (ref 0–1)
BUN BLDV-MCNC: 16 MG/DL (ref 7–20)
CALCIUM SERPL-MCNC: 10.2 MG/DL (ref 8.3–10.6)
CHLORIDE BLD-SCNC: 103 MMOL/L (ref 99–110)
CO2: 21 MMOL/L (ref 21–32)
CREAT SERPL-MCNC: <0.5 MG/DL (ref 0.6–1.1)
GFR AFRICAN AMERICAN: >60
GFR NON-AFRICAN AMERICAN: >60
GLOBULIN: 2.4 G/DL
GLUCOSE BLD-MCNC: 109 MG/DL (ref 70–99)
POTASSIUM SERPL-SCNC: 4.1 MMOL/L (ref 3.5–5.1)
SODIUM BLD-SCNC: 140 MMOL/L (ref 136–145)
T3 FREE: 2.3 PG/ML (ref 2.3–4.2)
T4 FREE: 1.1 NG/DL (ref 0.9–1.8)
TOTAL PROTEIN: 7 G/DL (ref 6.4–8.2)
TSH SERPL DL<=0.05 MIU/L-ACNC: 2.85 UIU/ML (ref 0.27–4.2)

## 2020-01-22 PROCEDURE — 84443 ASSAY THYROID STIM HORMONE: CPT

## 2020-01-22 PROCEDURE — 84481 FREE ASSAY (FT-3): CPT

## 2020-01-22 PROCEDURE — 84439 ASSAY OF FREE THYROXINE: CPT

## 2020-01-22 PROCEDURE — 80053 COMPREHEN METABOLIC PANEL: CPT

## 2020-01-22 PROCEDURE — 36415 COLL VENOUS BLD VENIPUNCTURE: CPT

## 2020-01-30 RX ORDER — RAMIPRIL 10 MG/1
CAPSULE ORAL
Qty: 90 CAPSULE | Refills: 3 | Status: SHIPPED | OUTPATIENT
Start: 2020-01-30 | End: 2020-12-30

## 2020-02-03 ENCOUNTER — TELEPHONE (OUTPATIENT)
Dept: ENDOCRINOLOGY | Age: 60
End: 2020-02-03

## 2020-02-04 RX ORDER — GEMFIBROZIL 600 MG/1
TABLET, FILM COATED ORAL
Qty: 180 TABLET | Refills: 3 | Status: SHIPPED | OUTPATIENT
Start: 2020-02-04 | End: 2020-12-30

## 2020-02-04 RX ORDER — ATORVASTATIN CALCIUM 20 MG/1
TABLET, FILM COATED ORAL
Qty: 90 TABLET | Refills: 3 | Status: SHIPPED | OUTPATIENT
Start: 2020-02-04 | End: 2020-12-30

## 2020-03-12 ENCOUNTER — OFFICE VISIT (OUTPATIENT)
Dept: INTERNAL MEDICINE CLINIC | Age: 60
End: 2020-03-12
Payer: COMMERCIAL

## 2020-03-12 VITALS
BODY MASS INDEX: 31.58 KG/M2 | DIASTOLIC BLOOD PRESSURE: 80 MMHG | WEIGHT: 185 LBS | SYSTOLIC BLOOD PRESSURE: 124 MMHG | HEIGHT: 64 IN

## 2020-03-12 PROCEDURE — 99213 OFFICE O/P EST LOW 20 MIN: CPT | Performed by: INTERNAL MEDICINE

## 2020-03-12 RX ORDER — ALPRAZOLAM 1 MG/1
1 TABLET ORAL NIGHTLY PRN
Qty: 90 TABLET | Refills: 0 | Status: SHIPPED | OUTPATIENT
Start: 2020-03-17 | End: 2020-06-09 | Stop reason: SDUPTHER

## 2020-03-12 NOTE — PROGRESS NOTES
3/12/2020     Luis Daniel Gonzalez (:  1960) is a 63 y.o. female, here for evaluation of the following medical concerns:    Chief Complaint   Patient presents with    Medication Refill     3mo. check refill needed        HPI    Anxiety -stable. Denies side effects with alprazolam.  Current dose works adequately. She has been doing very well with weight loss. Still seeing the weight loss clinic at [facility]. Will be getting blood work done to see how her cholesterol and sugar is doing. Last A1c was significantly improved. Review of Systems    Prior to Visit Medications    Medication Sig Taking? Authorizing Provider   ALPRAZoian Gr) 1 MG tablet Take 1 tablet by mouth nightly as needed for Sleep for up to 90 days. Yes Arnulfo Gonzales MD   atorvastatin (LIPITOR) 20 MG tablet TAKE 1 TABLET DAILY Yes Fransisco Pro MD   metFORMIN (GLUCOPHAGE) 850 MG tablet Take 1 tablet by mouth 2 times daily TAKE 1 TABLET THREE TIMES A DAY WITH MEALS FOR DIABETES Yes Fransisco Pro MD   gemfibrozil (LOPID) 600 MG tablet TAKE 1 TABLET TWICE A DAY FOR TRIGLYCERIDES Yes Fransisco Pro MD   sertraline (ZOLOFT) 50 MG tablet TAKE 1 TABLET DAILY FOR MOOD Yes Arnulfo Gonzales MD   ramipril (ALTACE) 10 MG capsule TAKE 1 CAPSULE DAILY FOR BLOOD PRESSURE Yes Arnulfo Gonzales MD   LANTUS SOLOSTAR 100 UNIT/ML injection pen INJECT 50 UNITS UNDER THE SKIN ONCE NIGHTLY  Patient taking differently: 15 Units  Yes Fransisco Pro MD   Insulin Pen Needle (B-D UF III MINI PEN NEEDLES) 31G X 5 MM MISC Inject insulin 4 times a day Yes Fransisco Pro MD   ONETOUCH DELICA LANCETS 01U MISC 1 each by Does not apply route 3 times daily (before meals) Yes Fransisco Pro MD   Glucosamine-Chondroitin (GLUCOSAMINE CHONDR COMPLEX PO) Take by mouth daily Yes Historical Provider, MD   Cholecalciferol ([employer] VIT D 5000 HIGH-POTENCY) 5000 UNITS CAPS capsule All patients should take 5000 IU or more Vitamin D every day for good general health. There are myriad benefits to higher Vitamin D levels. Women should also take Calcium 600 mg twice a day for bone health. Yes Daryle Ao, MD   cyanocobalamin ([employer] VITAMIN B12) 1000 MCG tablet Everyone should take OTC B12 1000 mcg twice a day for neurological health. High B12 levels help prevent dementia. Yes Daryle Ao, MD   fish oil-omega-3 fatty acids 1000 MG capsule Take 1 g by mouth three times daily. Yes Historical Provider, MD        Past Medical History:   Diagnosis Date    Allergic rhinitis     Asthma     H/O- stress related    Diabetes mellitus, type 1     History of blood transfusion 2001?  Hyperlipidemia     Other acute reactions to stress     Pancreatitis 2000    Rectal abscess     II       Past Surgical History:   Procedure Laterality Date    COLONOSCOPY      HYSTERECTOMY      KNEE ARTHROSCOPY Right 2014 x2    Dr. Patricia Raygoza ARTHROSCOPY Left 2015    OTHER SURGICAL HISTORY  6/1/16    EUS, no interventions    OTHER SURGICAL HISTORY  6/2/16    EXAMINATION UNDER ANESTHESIA, EXCISION OF ANAL MASS    OVARY REMOVAL Bilateral        Social History     Tobacco Use    Smoking status: Never Smoker    Smokeless tobacco: Never Used   Substance Use Topics    Alcohol use: No        Family History   Problem Relation Age of Onset    Heart Disease Mother     Diabetes Father        Vitals:    03/12/20 1405   BP: 124/80   Weight: 185 lb (83.9 kg)   Height: 5' 4\" (1.626 m)     Estimated body mass index is 31.76 kg/m² as calculated from the following:    Height as of this encounter: 5' 4\" (1.626 m). Weight as of this encounter: 185 lb (83.9 kg). Physical Exam  Vitals signs reviewed. Constitutional:       General: She is not in acute distress. Appearance: She is well-developed. HENT:      Head: Normocephalic and atraumatic. Cardiovascular:      Rate and Rhythm: Normal rate and regular rhythm. Heart sounds: Normal heart sounds.    Pulmonary:      Effort: Pulmonary effort is normal. No respiratory distress. Breath sounds: Normal breath sounds. Skin:     General: Skin is warm and dry. Neurological:      Mental Status: She is alert. Psychiatric:         Behavior: Behavior normal.         Thought Content: Thought content normal.         Judgment: Judgment normal.         ASSESSMENT/PLAN:  1. Situational mixed anxiety and depressive disorder  - stable  - OARRS reviewed  - ALPRAZolam (XANAX) 1 MG tablet; Take 1 tablet by mouth nightly as needed for Sleep for up to 90 days. Dispense: 90 tablet; Refill: 0    2. Class 1 obesity due to excess calories with serious comorbidity and body mass index (BMI) of 31.0 to 31.9 in adult  - doing well with weight loss, continue follow up with weight management clinic      Return in about 3 months (around 6/12/2020).

## 2020-06-09 ENCOUNTER — OFFICE VISIT (OUTPATIENT)
Dept: INTERNAL MEDICINE CLINIC | Age: 60
End: 2020-06-09
Payer: COMMERCIAL

## 2020-06-09 ENCOUNTER — HOSPITAL ENCOUNTER (OUTPATIENT)
Age: 60
Discharge: HOME | End: 2020-06-09
Payer: COMMERCIAL

## 2020-06-09 ENCOUNTER — HOSPITAL ENCOUNTER (OUTPATIENT)
Dept: GENERAL RADIOLOGY | Age: 60
Discharge: HOME | End: 2020-06-09
Payer: COMMERCIAL

## 2020-06-09 VITALS
TEMPERATURE: 99.5 F | DIASTOLIC BLOOD PRESSURE: 78 MMHG | BODY MASS INDEX: 30.9 KG/M2 | WEIGHT: 181 LBS | SYSTOLIC BLOOD PRESSURE: 150 MMHG | HEIGHT: 64 IN

## 2020-06-09 PROCEDURE — 99214 OFFICE O/P EST MOD 30 MIN: CPT | Performed by: INTERNAL MEDICINE

## 2020-06-09 PROCEDURE — 72100 X-RAY EXAM L-S SPINE 2/3 VWS: CPT

## 2020-06-09 RX ORDER — ALPRAZOLAM 1 MG/1
1 TABLET ORAL NIGHTLY PRN
Qty: 90 TABLET | Refills: 0 | Status: SHIPPED | OUTPATIENT
Start: 2020-06-16 | End: 2020-09-14

## 2020-06-09 RX ORDER — DICLOFENAC SODIUM 75 MG/1
75 TABLET, DELAYED RELEASE ORAL 2 TIMES DAILY
Qty: 60 TABLET | Refills: 0 | Status: SHIPPED | OUTPATIENT
Start: 2020-06-09 | End: 2020-07-24

## 2020-06-09 RX ORDER — METAXALONE 400 MG/1
400 TABLET ORAL 4 TIMES DAILY PRN
Qty: 40 TABLET | Refills: 1 | Status: SHIPPED | OUTPATIENT
Start: 2020-06-09 | End: 2020-07-24

## 2020-06-10 ASSESSMENT — ENCOUNTER SYMPTOMS: BACK PAIN: 1

## 2020-06-18 ENCOUNTER — HOSPITAL ENCOUNTER (OUTPATIENT)
Dept: MRI IMAGING | Age: 60
Discharge: HOME | End: 2020-06-18
Payer: COMMERCIAL

## 2020-06-18 PROCEDURE — 72148 MRI LUMBAR SPINE W/O DYE: CPT

## 2020-07-14 ENCOUNTER — HOSPITAL ENCOUNTER (OUTPATIENT)
Dept: CT IMAGING | Age: 60
Discharge: HOME | End: 2020-07-14
Payer: COMMERCIAL

## 2020-07-14 ENCOUNTER — HOSPITAL ENCOUNTER (OUTPATIENT)
Dept: WOMENS IMAGING | Age: 60
Discharge: HOME | End: 2020-07-14
Payer: COMMERCIAL

## 2020-07-14 PROCEDURE — 77080 DXA BONE DENSITY AXIAL: CPT

## 2020-07-14 PROCEDURE — 72131 CT LUMBAR SPINE W/O DYE: CPT

## 2020-07-22 ENCOUNTER — TELEPHONE (OUTPATIENT)
Dept: INTERNAL MEDICINE CLINIC | Age: 60
End: 2020-07-22

## 2020-07-22 NOTE — TELEPHONE ENCOUNTER
Pt called stating that she just got word from [id number] [facility] about a a procedure with Dr. Cameron Signs next weds 7/29 and needs a pre-op and all the paperwork and labs/ covid test done by Friday. July 24. Provider has no availability.     Please advise

## 2020-07-22 NOTE — TELEPHONE ENCOUNTER
Chanda Gonzales spoke with Dr Wong Cary and Dr Pak Deep advised that she could see another Dr in the office is available.

## 2020-07-24 ENCOUNTER — HOSPITAL ENCOUNTER (OUTPATIENT)
Age: 60
Discharge: HOME | End: 2020-07-24
Payer: COMMERCIAL

## 2020-07-24 ENCOUNTER — OFFICE VISIT (OUTPATIENT)
Dept: INTERNAL MEDICINE CLINIC | Age: 60
End: 2020-07-24
Payer: COMMERCIAL

## 2020-07-24 VITALS
WEIGHT: 178.6 LBS | HEART RATE: 96 BPM | OXYGEN SATURATION: 98 % | DIASTOLIC BLOOD PRESSURE: 76 MMHG | BODY MASS INDEX: 30.49 KG/M2 | TEMPERATURE: 97.6 F | SYSTOLIC BLOOD PRESSURE: 100 MMHG | HEIGHT: 64 IN

## 2020-07-24 LAB
A/G RATIO: 1.6 (ref 1.1–2.2)
ALBUMIN SERPL-MCNC: 4.4 G/DL (ref 3.4–5)
ALP BLD-CCNC: 69 U/L (ref 40–129)
ALT SERPL-CCNC: 13 U/L (ref 10–40)
ANION GAP SERPL CALCULATED.3IONS-SCNC: 16 MMOL/L (ref 3–16)
APTT: 30.5 SEC (ref 24.2–36.2)
AST SERPL-CCNC: 16 U/L (ref 15–37)
BASOPHILS ABSOLUTE: 0 K/UL (ref 0–0.2)
BASOPHILS RELATIVE PERCENT: 0.6 %
BILIRUB SERPL-MCNC: 0.6 MG/DL (ref 0–1)
BUN BLDV-MCNC: 17 MG/DL (ref 7–20)
CALCIUM SERPL-MCNC: 10.2 MG/DL (ref 8.3–10.6)
CHLORIDE BLD-SCNC: 97 MMOL/L (ref 99–110)
CO2: 26 MMOL/L (ref 21–32)
CREAT SERPL-MCNC: 0.6 MG/DL (ref 0.6–1.1)
EKG ATRIAL RATE: 94 BPM
EKG DIAGNOSIS: NORMAL
EKG P AXIS: 17 DEGREES
EKG P-R INTERVAL: 136 MS
EKG Q-T INTERVAL: 374 MS
EKG QRS DURATION: 76 MS
EKG QTC CALCULATION (BAZETT): 467 MS
EKG R AXIS: 41 DEGREES
EKG T AXIS: 12 DEGREES
EKG VENTRICULAR RATE: 94 BPM
EOSINOPHILS ABSOLUTE: 0.5 K/UL (ref 0–0.6)
EOSINOPHILS RELATIVE PERCENT: 10.5 %
GFR AFRICAN AMERICAN: >60
GFR NON-AFRICAN AMERICAN: >60
GLOBULIN: 2.7 G/DL
GLUCOSE BLD-MCNC: 214 MG/DL (ref 70–99)
HCT VFR BLD CALC: 38.5 % (ref 36–48)
HEMOGLOBIN: 13.2 G/DL (ref 12–16)
INR BLD: 0.97 (ref 0.86–1.14)
LYMPHOCYTES ABSOLUTE: 1.6 K/UL (ref 1–5.1)
LYMPHOCYTES RELATIVE PERCENT: 33.8 %
MCH RBC QN AUTO: 31.2 PG (ref 26–34)
MCHC RBC AUTO-ENTMCNC: 34.2 G/DL (ref 31–36)
MCV RBC AUTO: 91.2 FL (ref 80–100)
MONOCYTES ABSOLUTE: 0.3 K/UL (ref 0–1.3)
MONOCYTES RELATIVE PERCENT: 7.3 %
NEUTROPHILS ABSOLUTE: 2.2 K/UL (ref 1.7–7.7)
NEUTROPHILS RELATIVE PERCENT: 47.8 %
PDW BLD-RTO: 12.1 % (ref 12.4–15.4)
PLATELET # BLD: 225 K/UL (ref 135–450)
PMV BLD AUTO: 9 FL (ref 5–10.5)
POTASSIUM SERPL-SCNC: 4.1 MMOL/L (ref 3.5–5.1)
PROTHROMBIN TIME: 11.3 SEC (ref 10–13.2)
RBC # BLD: 4.22 M/UL (ref 4–5.2)
SODIUM BLD-SCNC: 139 MMOL/L (ref 136–145)
TOTAL PROTEIN: 7.1 G/DL (ref 6.4–8.2)
WBC # BLD: 4.7 K/UL (ref 4–11)

## 2020-07-24 PROCEDURE — 85025 COMPLETE CBC W/AUTO DIFF WBC: CPT

## 2020-07-24 PROCEDURE — 99214 OFFICE O/P EST MOD 30 MIN: CPT | Performed by: INTERNAL MEDICINE

## 2020-07-24 PROCEDURE — 85610 PROTHROMBIN TIME: CPT

## 2020-07-24 PROCEDURE — 36415 COLL VENOUS BLD VENIPUNCTURE: CPT

## 2020-07-24 PROCEDURE — 93005 ELECTROCARDIOGRAM TRACING: CPT | Performed by: NEUROLOGICAL SURGERY

## 2020-07-24 PROCEDURE — 85730 THROMBOPLASTIN TIME PARTIAL: CPT

## 2020-07-24 PROCEDURE — 80053 COMPREHEN METABOLIC PANEL: CPT

## 2020-07-24 NOTE — PROGRESS NOTES
The current problem began 2 months ago, and symptoms have not improved with time. She has persistent severe back pain. Not able to do much right now due to the pain from the fracture. Prior to the fall she had good exercise intolerance. She walked regularly and was able to do heavy housework without any limitations. She has insulin dependent diabetes which has been stable. She has been taking lantus 15 units at night. Fasting sugars have been 70-90s. Occasional low blood sugars. Planned anesthesia: General   Known anesthesia problems: None   Bleeding risk: No recent or remote history of abnormal bleeding  Personal or FH of DVT/PE: No      Patient Active Problem List   Diagnosis    Hypertension    Hyperlipidemia    Other acute reactions to stress    Diabetes mellitus type 1 ([facility] [address]    Situational mixed anxiety and depressive disorder    Type 2 diabetes mellitus without complication, with long-term current use of insulin ([facility])    Class 1 obesity due to excess calories with serious comorbidity and body mass index (BMI) of 33.0 to 33.9 in adult       Past Medical History:   Diagnosis Date    Allergic rhinitis     Asthma     H/O- stress related    Diabetes mellitus, type 1     History of blood transfusion 2001?     Hyperlipidemia     Other acute reactions to stress     Pancreatitis 2000    Rectal abscess     II     Past Surgical History:   Procedure Laterality Date    COLONOSCOPY      HYSTERECTOMY      KNEE ARTHROSCOPY Right 2014 x2    Dr. Mabry Locus ARTHROSCOPY Left 2015    OTHER SURGICAL HISTORY  6/1/16    EUS, no interventions    OTHER SURGICAL HISTORY  6/2/16    EXAMINATION UNDER ANESTHESIA, EXCISION OF ANAL MASS    OVARY REMOVAL Bilateral      Family History   Problem Relation Age of Onset    Heart Disease Mother     Diabetes Father      Social History     Socioeconomic History    Marital status: Single     Spouse name: Not on file    Number of children: Not on file    Years of education: Not on file    Highest education level: Not on file   Occupational History    Not on file   Social Needs    Financial resource strain: Not on file    Food insecurity     Worry: Not on file     Inability: Not on file    Transportation needs     Medical: Not on file     Non-medical: Not on file   Tobacco Use    Smoking status: Never Smoker    Smokeless tobacco: Never Used   Substance and Sexual Activity    Alcohol use: No    Drug use: No    Sexual activity: Not on file   Lifestyle    Physical activity     Days per week: Not on file     Minutes per session: Not on file    Stress: Not on file   Relationships    Social connections     Talks on phone: Not on file     Gets together: Not on file     Attends [employer] service: Not on file     Active member of club or organization: Not on file     Attends meetings of clubs or organizations: Not on file     Relationship status: Not on file    Intimate partner violence     Fear of current or ex partner: Not on file     Emotionally abused: Not on file     Physically abused: Not on file     Forced sexual activity: Not on file   Other Topics Concern    Not on file   Social History Narrative    Not on file       Review of Systems  A comprehensive review of systems was negative except for what was noted in the HPI. Physical Exam   Constitutional: She is oriented to person, place, and time. She appears well-developed and well-nourished. No distress. HENT:   Head: Normocephalic and atraumatic. Mouth/Throat: Uvula is midline, oropharynx is clear and moist and mucous membranes are normal.   Eyes: Conjunctivae and EOM are normal. Pupils are equal, round, and reactive to light. Neck: Trachea normal and normal range of motion. Neck supple. No JVD present. Carotid bruit is not present. No mass and no thyromegaly present. Cardiovascular: Normal rate, regular rhythm, normal heart sounds and intact distal pulses.   Exam reveals no gallop and no friction rub. No murmur heard. Pulmonary/Chest: Effort normal and breath sounds normal. No respiratory distress. She has no wheezes. She has no rales. Abdominal: Soft. Normal aorta and bowel sounds are normal. She exhibits no distension and no mass. There is no hepatosplenomegaly. No tenderness. Musculoskeletal: She exhibits no edema and no tenderness. Neurological: She is alert and oriented to person, place, and time. She has normal strength. No cranial nerve deficit or sensory deficit. Coordination and gait normal.   Skin: Skin is warm and dry. No rash noted. No erythema. Psychiatric: She has a normal mood and affect. Her behavior is normal.     EKG, labs ordered by surgeon     Assessment:       62 y.o. patient with planned surgery as above. Known risk factors for perioperative complications: Diabetes mellitus, obesity  Current medications which may produce withdrawal symptoms if withheld perioperatively: none      Plan:   1. Preop examination  - Preoperative workup as follows: blood work and EKG done by surgeon  - Change in medication regimen before surgery: Hold all medications on morning of surgery, no NSAIDs 5 days prior. Decrease lantus to 5 units evening before surgery, can return to usual dose post-op  - Prophylaxis for cardiac events with perioperative beta-blockers: Not indicated  - No contraindications to planned surgery    2. 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  Type 2 diabetes mellitus without complication, with long-term current use of insulin (HCC)  - has been very well-controlled  - lantus as above

## 2020-07-31 LAB
AVERAGE GLUCOSE: NORMAL
HBA1C MFR BLD: 8.7 %

## 2020-09-14 ENCOUNTER — APPOINTMENT (OUTPATIENT)
Dept: CT IMAGING | Age: 60
End: 2020-09-14
Payer: COMMERCIAL

## 2020-09-14 ENCOUNTER — HOSPITAL ENCOUNTER (EMERGENCY)
Age: 60
Discharge: HOME | End: 2020-09-14
Attending: EMERGENCY MEDICINE
Payer: COMMERCIAL

## 2020-09-14 ENCOUNTER — VIRTUAL VISIT (OUTPATIENT)
Dept: INTERNAL MEDICINE CLINIC | Age: 60
End: 2020-09-14
Payer: COMMERCIAL

## 2020-09-14 VITALS
WEIGHT: 180 LBS | BODY MASS INDEX: 30.9 KG/M2 | OXYGEN SATURATION: 96 % | RESPIRATION RATE: 16 BRPM | SYSTOLIC BLOOD PRESSURE: 128 MMHG | DIASTOLIC BLOOD PRESSURE: 74 MMHG | HEART RATE: 89 BPM | TEMPERATURE: 98.3 F

## 2020-09-14 PROCEDURE — 99442 PR PHYS/QHP TELEPHONE EVALUATION 11-20 MIN: CPT | Performed by: INTERNAL MEDICINE

## 2020-09-14 PROCEDURE — 72131 CT LUMBAR SPINE W/O DYE: CPT

## 2020-09-14 PROCEDURE — 71250 CT THORAX DX C-: CPT

## 2020-09-14 PROCEDURE — 72128 CT CHEST SPINE W/O DYE: CPT

## 2020-09-14 PROCEDURE — 99284 EMERGENCY DEPT VISIT MOD MDM: CPT

## 2020-09-14 PROCEDURE — 6370000000 HC RX 637 (ALT 250 FOR IP): Performed by: EMERGENCY MEDICINE

## 2020-09-14 RX ORDER — INSULIN GLARGINE 100 [IU]/ML
18 INJECTION, SOLUTION SUBCUTANEOUS NIGHTLY
COMMUNITY
End: 2020-11-16

## 2020-09-14 RX ORDER — OXYCODONE HYDROCHLORIDE 5 MG/1
10 TABLET ORAL ONCE
Status: COMPLETED | OUTPATIENT
Start: 2020-09-14 | End: 2020-09-14

## 2020-09-14 RX ORDER — DIAZEPAM 5 MG/1
5 TABLET ORAL EVERY 6 HOURS PRN
COMMUNITY
Start: 2020-09-08 | End: 2021-04-19

## 2020-09-14 RX ORDER — AMOXICILLIN AND CLAVULANATE POTASSIUM 875; 125 MG/1; MG/1
1 TABLET, FILM COATED ORAL 2 TIMES DAILY
Qty: 14 TABLET | Refills: 0 | Status: SHIPPED | OUTPATIENT
Start: 2020-09-14 | End: 2020-09-21

## 2020-09-14 RX ORDER — GABAPENTIN 100 MG/1
100 CAPSULE ORAL 3 TIMES DAILY
COMMUNITY
Start: 2020-08-21 | End: 2021-04-19

## 2020-09-14 RX ORDER — OXYCODONE HYDROCHLORIDE AND ACETAMINOPHEN 5; 325 MG/1; MG/1
2 TABLET ORAL EVERY 6 HOURS PRN
COMMUNITY
Start: 2020-08-07 | End: 2021-04-19

## 2020-09-14 RX ADMIN — OXYCODONE 10 MG: 5 TABLET ORAL at 08:31

## 2020-09-14 ASSESSMENT — PAIN SCALES - GENERAL
PAINLEVEL_OUTOF10: 10
PAINLEVEL_OUTOF10: 5
PAINLEVEL_OUTOF10: 10
PAINLEVEL_OUTOF10: 5

## 2020-09-14 ASSESSMENT — ENCOUNTER SYMPTOMS
NAUSEA: 0
VOMITING: 0
SHORTNESS OF BREATH: 0
COLOR CHANGE: 0
VOICE CHANGE: 0
TROUBLE SWALLOWING: 0
ABDOMINAL PAIN: 0
WHEEZING: 0
FACIAL SWELLING: 0
STRIDOR: 0
BACK PAIN: 1

## 2020-09-14 ASSESSMENT — PAIN DESCRIPTION - LOCATION: LOCATION: BACK

## 2020-09-14 ASSESSMENT — PAIN DESCRIPTION - FREQUENCY: FREQUENCY: CONTINUOUS

## 2020-09-14 ASSESSMENT — PAIN DESCRIPTION - PAIN TYPE: TYPE: ACUTE PAIN

## 2020-09-14 NOTE — PROGRESS NOTES
MD Angela   atorvastatin (LIPITOR) 20 MG tablet TAKE 1 TABLET DAILY Yes Bishop Dotty MD   gemfibrozil (LOPID) 600 MG tablet TAKE 1 TABLET TWICE A DAY FOR TRIGLYCERIDES Yes Bishop Dotty MD   sertraline (ZOLOFT) 50 MG tablet TAKE 1 TABLET DAILY FOR MOOD Yes Sayda Florian MD   ramipril (ALTACE) 10 MG capsule TAKE 1 CAPSULE DAILY FOR BLOOD PRESSURE Yes Sayda Florian MD   Insulin Pen Needle (B-D UF III MINI PEN NEEDLES) 31G X 5 MM MISC Inject insulin 4 times a day Yes Bishop Dotty MD   ONETOUCH DELICA LANCETS 91S MISC 1 each by Does not apply route 3 times daily (before meals) Yes Bishop Dotty MD   Glucosamine-Chondroitin (GLUCOSAMINE CHONDR COMPLEX PO) Take by mouth daily Yes Historical Provider, MD       Social History     Tobacco Use    Smoking status: Never Smoker    Smokeless tobacco: Never Used   Substance Use Topics    Alcohol use: No    Drug use: No            PHYSICAL EXAMINATION:  [ INSTRUCTIONS:  \"[x]\" Indicates a positive item  \"[]\" Indicates a negative item  -- DELETE ALL ITEMS NOT EXAMINED]  Vital Signs: (As obtained by patient/caregiver or practitioner observation)    Blood pressure-  Heart rate-    Respiratory rate-    Temperature-  Pulse oximetry-     Constitutional: [] Appears well-developed and well-nourished [] No apparent distress      [] Abnormal-   Mental status  [] Alert and awake  [] Oriented to person/place/time []Able to follow commands      Eyes:  EOM    []  Normal  [] Abnormal-  Sclera  []  Normal  [] Abnormal -         Discharge []  None visible  [] Abnormal -    HENT:   [] Normocephalic, atraumatic.   [] Abnormal   [] Mouth/Throat: Mucous membranes are moist.     External Ears [] Normal  [] Abnormal-     Neck: [] No visualized mass     Pulmonary/Chest: [] Respiratory effort normal.  [] No visualized signs of difficulty breathing or respiratory distress        [] Abnormal-      Musculoskeletal:   [] Normal gait with no signs of ataxia         [] Normal range of motion of this office for worsening conditions or problems, and seek emergency medical treatment and/or call 911 if deemed necessary. Patient identification was verified at the start of the visit: Yes    Total time spent on this encounter: 15 min    Services were provided through a video synchronous discussion virtually to substitute for in-person clinic visit. Patient and provider were located at their individual homes. --Juan Diego Barrientos MD on 9/16/2020 at 5:22 AM    An electronic signature was used to authenticate this note.

## 2020-09-14 NOTE — ED NOTES
Pt ambulatory to room from [facility] with walker and with a steady gait.       Bashir Martinestering  09/14/20 [id number]

## 2020-09-14 NOTE — ED NOTES

## 2020-09-14 NOTE — ED NOTES

## 2020-09-14 NOTE — ED PROVIDER NOTES
[facility]  ED  eMERGENCY dEPARTMENT eNCOUnter      Pt Name: Branden Vee  MRN: [id number]  [place] 1960  Date of evaluation: 9/14/2020  Provider: Massiel Sadler MD    CHIEF COMPLAINT       Chief Complaint   Patient presents with   Walter Fall     getting out of bed and landed on back. recent back surgery. Pt had surgery by Dr. Kwame Granados with [facility]    Back Pain     started 0300    Medication Refill     Pt states she is out of her Percocet. HISTORY OF PRESENT ILLNESS   (Location/Symptom, Timing/Onset, Context/Setting, Quality, Duration, Modifying Factors, Severity)  Note limiting factors. Branden Vee is a 62 y.o. female with recent spinal fusion surgery approximately 1-1/2 months ago from [address] [place] [address] who presents after suffering a fall. The patient reports she suffered a mechanical fall when her \"foot slipped\" while getting out of bed causing her to land flat on her back on a hardwood floor. Patient denies any head or neck injury or pain. She has any loss of consciousness. She does report her back was already hurting her but hurts worse since the fall. Reports pain is severe, constant worsening. Worse bending moving worsens the pain and nothing improves it. She denies any leg numbness or weakness. Denies any saddle anesthesia. She denies any change in her ability to ambulate and she is been ambulatory with a walker since her surgery. She denies any urinary or bowel incontinence, fever, or infectious symptoms. HPI    Nursing Notes were reviewed. REVIEW OFSYSTEMS    (2-9 systems for level 4, 10 or more for level 5)     Review of Systems   Constitutional: Negative for appetite change, fever and unexpected weight change. HENT: Negative for facial swelling, trouble swallowing and voice change. Eyes: Negative for visual disturbance. Respiratory: Negative for shortness of breath, wheezing and stridor.     Cardiovascular: Negative for chest pain and palpitations. Gastrointestinal: Negative for abdominal pain, nausea and vomiting. Genitourinary: Negative for dysuria and vaginal bleeding. Musculoskeletal: Positive for back pain. Negative for neck pain and neck stiffness. Skin: Negative for color change and wound. Neurological: Negative for seizures and syncope. Psychiatric/Behavioral: Negative for self-injury and suicidal ideas. Except as noted above the remainder of the review of systems was reviewed and negative. PAST MEDICAL HISTORY     Past Medical History:   Diagnosis Date    Allergic rhinitis     Asthma     H/O- stress related    Diabetes mellitus, type 1     History of blood transfusion 2001?  Hyperlipidemia     Other acute reactions to stress     Pancreatitis 2000    Rectal abscess     II         SURGICAL HISTORY       Past Surgical History:   Procedure Laterality Date    COLONOSCOPY      HYSTERECTOMY      KNEE ARTHROSCOPY Right 2014 x2    Dr. Kimberley Myers ARTHROSCOPY Left 2015    OTHER SURGICAL HISTORY  6/1/16    EUS, no interventions    OTHER SURGICAL HISTORY  6/2/16    EXAMINATION UNDER ANESTHESIA, EXCISION OF ANAL MASS    OVARY REMOVAL Bilateral          CURRENT MEDICATIONS       Previous Medications    ALPRAZOLAM (XANAX) 1 MG TABLET    Take 1 tablet by mouth nightly as needed for Sleep for up to 90 days. ATORVASTATIN (LIPITOR) 20 MG TABLET    TAKE 1 TABLET DAILY    CHOLECALCIFEROL ([employer]S VIT D 5000 HIGH-POTENCY) 5000 UNITS CAPS CAPSULE    All patients should take 5000 IU or more Vitamin D every day for good general health. There are myriad benefits to higher Vitamin D levels. Women should also take Calcium 600 mg twice a day for bone health. CYANOCOBALAMIN ([employer]S VITAMIN B12) 1000 MCG TABLET    Everyone should take OTC B12 1000 mcg twice a day for neurological health. High B12 levels help prevent dementia. DIAZEPAM (VALIUM) 5 MG TABLET    Take 5 mg by mouth every 6 hours as needed.      FISH OIL-OMEGA-3 FATTY ACIDS 1000 MG CAPSULE    Take 1 g by mouth three times daily. GABAPENTIN (NEURONTIN) 100 MG CAPSULE    Take 100 mg by mouth 3 times daily. GEMFIBROZIL (LOPID) 600 MG TABLET    TAKE 1 TABLET TWICE A DAY FOR TRIGLYCERIDES    GLUCOSAMINE-CHONDROITIN (GLUCOSAMINE CHONDR COMPLEX PO)    Take by mouth daily    INSULIN PEN NEEDLE (B-D UF III MINI PEN NEEDLES) 31G X 5 MM MISC    Inject insulin 4 times a day    LANTUS SOLOSTAR 100 UNIT/ML INJECTION PEN    INJECT 50 UNITS UNDER THE SKIN ONCE NIGHTLY    METFORMIN (GLUCOPHAGE) 850 MG TABLET    Take 1 tablet by mouth 2 times daily TAKE 1 TABLET THREE TIMES A DAY WITH MEALS FOR DIABETES    ONETOUCH DELICA LANCETS 90A MISC    1 each by Does not apply route 3 times daily (before meals)    OXYCODONE-ACETAMINOPHEN (PERCOCET) 5-325 MG PER TABLET    Take 2 tablets by mouth every 6 hours as needed.  Pt states she is out of Percocet at this time    RAMIPRIL (ALTACE) 10 MG CAPSULE    TAKE 1 CAPSULE DAILY FOR BLOOD PRESSURE    SERTRALINE (ZOLOFT) 50 MG TABLET    TAKE 1 TABLET DAILY FOR MOOD       ALLERGIES     Morphine    FAMILY HISTORY       Family History   Problem Relation Age of Onset    Heart Disease Mother     Diabetes Father           SOCIAL HISTORY       Social History     Socioeconomic History    Marital status: Single     Spouse name: Not on file    Number of children: Not on file    Years of education: Not on file    Highest education level: Not on file   Occupational History    Not on file   Social Needs    Financial resource strain: Not on file    Food insecurity     Worry: Not on file     Inability: Not on file    Transportation needs     Medical: Not on file     Non-medical: Not on file   Tobacco Use    Smoking status: Never Smoker    Smokeless tobacco: Never Used   Substance and Sexual Activity    Alcohol use: No    Drug use: No    Sexual activity: Not on file   Lifestyle    Physical activity     Days per week: Not on file Minutes per session: Not on file    Stress: Not on file   Relationships    Social connections     Talks on phone: Not on file     Gets together: Not on file     Attends Cheondoism service: Not on file     Active member of club or organization: Not on file     Attends meetings of clubs or organizations: Not on file     Relationship status: Not on file    Intimate partner violence     Fear of current or ex partner: Not on file     Emotionally abused: Not on file     Physically abused: Not on file     Forced sexual activity: Not on file   Other Topics Concern    Not on file   Social History Narrative    Not on file         PHYSICAL EXAM    (up to 7 for level 4, 8 or more for level 5)     ED Triage Vitals [09/14/20 0642]   BP Temp Temp Source Pulse Resp SpO2 Height Weight   (!) 154/91 98.1 °F (36.7 °C) Oral 86 15 97 % -- 180 lb (81.6 kg)       Physical Exam  Vitals signs and nursing note reviewed. Constitutional:       Appearance: She is well-developed. She is not diaphoretic. HENT:      Head: Normocephalic and atraumatic. Comments: No raccoon sign, lopez sign, or hemotympanum. No signs of trauma to the head or neck. Right Ear: External ear normal.      Left Ear: External ear normal.   Eyes:      Conjunctiva/sclera: Conjunctivae normal.   Neck:      Musculoskeletal: Neck supple. Vascular: No JVD. Trachea: No tracheal deviation. Cardiovascular:      Rate and Rhythm: Normal rate. Pulmonary:      Effort: Pulmonary effort is normal. No respiratory distress. Breath sounds: Normal breath sounds. No wheezing. Abdominal:      General: There is no distension. Palpations: Abdomen is soft. Tenderness: There is no abdominal tenderness. There is no guarding or rebound. Musculoskeletal: Normal range of motion. General: Tenderness present. No swelling, deformity or signs of injury. Comments: Midline tenderness to the lower thoracic through mid lumbar spine.   No signs of acute infection. Skin:     General: Skin is warm and dry. Neurological:      General: No focal deficit present. Mental Status: She is alert and oriented to person, place, and time. Cranial Nerves: No cranial nerve deficit. Comments: No saddle anesthesia. Sensation intact in all nerve distributions of bilateral lower extremities. 5 of 5 strength equal in bilateral lower extremities. Patient ambulatory with walker with steady gait. 2+ patellar reflexes equal bilaterally. DIAGNOSTIC RESULTS         RADIOLOGY:     Interpretation per the Radiologist below, if available at the time of this note:    CT CHEST WO CONTRAST   Final Result   Findings on recent thoracic spine CT persist.  There is a cavitary nodule in   the right lower lobe, favored to be postinflammatory-infectious given the   surrounding tree-in-bud nodularity. Neoplastic process is considered less   likely but not entirely excluded      Scattered additional punctate pulmonary nodules are seen throughout the right   and left lung, favored to be postinflammatory-infectious in the absence of a   known primary. CT THORACIC SPINE WO CONTRAST   Final Result   No acute thoracic spinal abnormality. 1.4 cm right lower lobe soft tissue mass. RECOMMENDATIONS:   14.0 mm solid pulmonary nodule detected on incomplete chest CT. Recommend   immediate non-contrast Chest CT for further evaluation. These guidelines do not apply to immunocompromised patients and patients with   cancer. Follow up in patients with significant comorbidities as clinically   warranted. For lung cancer screening, adhere to Lung-RADS guidelines. Reference: Radiology. 2017; 284(1):228-43. CT LUMBAR SPINE WO CONTRAST   Final Result   Interval surgery with internal fixation and realignment, L1 compression   fracture as above. There is moderate metal artifact. Postsurgical changes   as above.       Mild disc bulge with asymmetric protrusion left paracentral L5-S1. ED BEDSIDE ULTRASOUND:   Performed by ED Physician - none    LABS:  Labs Reviewed - No data to display    All otherlabs were within normal range or not returned as of this dictation. EMERGENCY DEPARTMENT COURSE and DIFFERENTIAL DIAGNOSIS/MDM:   Vitals:    Vitals:    09/14/20 0642 09/14/20 0833   BP: (!) 154/91 138/77   Pulse: 86 85   Resp: 15 16   Temp: 98.1 °F (36.7 °C)    TempSrc: Oral    SpO2: 97% 97%   Weight: 180 lb (81.6 kg)          MDM  Imaging of the spine shows an L1 compression fracture, it is unclear whether this is acute or not. Hardware is in place with no signs of dislodgment or malfunction. Patient has no signs of CNS compression and has a normal neurologic exam.  An incidental finding of a right lower lobe pulmonary nodule is seen on spinal CT and there is recommendations for immediate CT chest for further characterization. The CT chest is performed with the nodule being favored to be post inflammatory or infectious. The patient denies any cough fever I do not suspect current infection or need for antibiotics at this time. I have made her aware of this pulmonary nodule and need to follow-up with her outpatient doctor for possible further imaging in the future. The patient does feel better after being given pain medicine in the emergency department. I have advised her to follow-up with [facility] and neurosurgery for her back pain and come back to emergency department immediately for any leg weakness, numbness, fever, or urinary and bowel incontinence. The patient expresses understanding and agreement with this plan and is discharged home. Procedures    FINAL IMPRESSION      1. Compression fracture of L1 vertebra, initial encounter ([facility] [address]    2. Fall, initial encounter    3.  Pulmonary nodule          DISPOSITION/PLAN   DISPOSITION Decision To Discharge 09/14/2020 10:13:01 AM      PATIENT REFERRED TO:  Seth Aguilar MD  [address]    In 1 day      Radha Crocker MD  [address] [place] [address] [address]  [phone]    In 1 week  For right lower lobe pulmonary nodule    [facility]  ED  [address] [facility]  [address]  [phone]    If symptoms worsen      (Please note that portions of this note were completed with a voice recognition program.  Efforts were made to edit the dictations but occasionally words aremis-transcribed. )    Maria Mccarthy MD (electronically signed)  Attending Emergency Physician       Maria Mccarthy MD  09/14/20 4420

## 2020-10-05 RX ORDER — ALPRAZOLAM 1 MG/1
1 TABLET ORAL NIGHTLY PRN
Qty: 90 TABLET | Refills: 0 | Status: SHIPPED | OUTPATIENT
Start: 2020-10-05 | End: 2021-01-05 | Stop reason: SDUPTHER

## 2020-10-05 NOTE — TELEPHONE ENCOUNTER
ALPRAZolam (XANAX) 1 MG tablet [[id number]]- in need of a refill pls advise        HEART OF [address] [facility]

## 2020-10-13 NOTE — TELEPHONE ENCOUNTER
LOV:11/25/19     NOV: NONE     DOSE: 850 mg     Frequency: BID    Pharmacy as Pended:     Per LOV Note: Continue metformin    Per Last PHONE NOTE:     Lab Results   Component Value Date    LABA1C 7.3 11/25/2019

## 2020-11-16 RX ORDER — INSULIN GLARGINE 100 [IU]/ML
INJECTION, SOLUTION SUBCUTANEOUS
Qty: 5 PEN | Refills: 0 | Status: SHIPPED | OUTPATIENT
Start: 2020-11-16 | End: 2021-04-15 | Stop reason: SDUPTHER

## 2020-11-16 NOTE — TELEPHONE ENCOUNTER
Pharmacy calling to see if Justus Muriel is ok for patient due to lantus not being covered by her insurance

## 2020-12-17 ENCOUNTER — TELEPHONE (OUTPATIENT)
Dept: INTERNAL MEDICINE CLINIC | Age: 60
End: 2020-12-17

## 2020-12-23 ENCOUNTER — HOSPITAL ENCOUNTER (OUTPATIENT)
Dept: CT IMAGING | Age: 60
Discharge: HOME | End: 2020-12-23
Payer: COMMERCIAL

## 2020-12-23 PROCEDURE — 71250 CT THORAX DX C-: CPT

## 2020-12-30 RX ORDER — RAMIPRIL 10 MG/1
CAPSULE ORAL
Qty: 90 CAPSULE | Refills: 3 | Status: SHIPPED | OUTPATIENT
Start: 2020-12-30 | End: 2021-01-05 | Stop reason: SDUPTHER

## 2020-12-30 RX ORDER — ATORVASTATIN CALCIUM 20 MG/1
TABLET, FILM COATED ORAL
Qty: 30 TABLET | Refills: 0 | Status: SHIPPED | OUTPATIENT
Start: 2020-12-30 | End: 2021-02-12 | Stop reason: SDUPTHER

## 2020-12-30 RX ORDER — GEMFIBROZIL 600 MG/1
TABLET, FILM COATED ORAL
Qty: 60 TABLET | Refills: 0 | Status: SHIPPED | OUTPATIENT
Start: 2020-12-30 | End: 2021-02-12 | Stop reason: SDUPTHER

## 2020-12-30 NOTE — TELEPHONE ENCOUNTER
Medication:   Requested Prescriptions     Pending Prescriptions Disp Refills    metFORMIN (GLUCOPHAGE) 850 MG tablet [Pharmacy Med Name: METFORMIN HCL TABS 850MG] 180 tablet 3     Sig: TAKE 1 TABLET TWICE A DAY WITH MEALS    gemfibrozil (LOPID) 600 MG tablet [Pharmacy Med Name: GEMFIBROZIL TABS 600MG] 180 tablet 3     Sig: TAKE 1 TABLET TWICE A DAY FOR TRIGLYCERIDES    atorvastatin (LIPITOR) 20 MG tablet [Pharmacy Med Name: ATORVASTATIN TABS 20MG] 90 tablet 3     Sig: TAKE 1 TABLET DAILY         Last appt: 11/25/2019   Next appt: Visit date not found    Last Labs DM:   Lab Results   Component Value Date    LABA1C 8.7 07/31/2020

## 2021-01-05 ENCOUNTER — TELEPHONE (OUTPATIENT)
Dept: INTERNAL MEDICINE CLINIC | Age: 61
End: 2021-01-05

## 2021-01-05 DIAGNOSIS — F43.23 SITUATIONAL MIXED ANXIETY AND DEPRESSIVE DISORDER: ICD-10-CM

## 2021-01-05 RX ORDER — RAMIPRIL 10 MG/1
CAPSULE ORAL
Qty: 90 CAPSULE | Refills: 3 | Status: SHIPPED | OUTPATIENT
Start: 2021-01-05 | End: 2021-11-22

## 2021-01-05 RX ORDER — ALPRAZOLAM 1 MG/1
1 TABLET ORAL NIGHTLY PRN
Qty: 14 TABLET | Refills: 0 | Status: SHIPPED | OUTPATIENT
Start: 2021-01-05 | End: 2021-01-19

## 2021-01-05 NOTE — TELEPHONE ENCOUNTER
Patient called to have ALPRAZolam (XANAX) 1 MG tablet refilled. [address] [place] [address], [address] [place] [address] [phone]    Patient is also requesting to have sertraline (ZOLOFT) 50 MG tablet and ramipril (ALTACE) 10 MG capsule refilled. [address], [address]    Patient also has an appointment. Please advise.

## 2021-01-08 ENCOUNTER — TELEPHONE (OUTPATIENT)
Dept: INTERNAL MEDICINE CLINIC | Age: 61
End: 2021-01-08

## 2021-01-08 ENCOUNTER — VIRTUAL VISIT (OUTPATIENT)
Dept: INTERNAL MEDICINE CLINIC | Age: 61
End: 2021-01-08
Payer: COMMERCIAL

## 2021-01-08 DIAGNOSIS — B37.2 CANDIDAL INTERTRIGO: Primary | ICD-10-CM

## 2021-01-08 PROCEDURE — 99213 OFFICE O/P EST LOW 20 MIN: CPT | Performed by: INTERNAL MEDICINE

## 2021-01-08 SDOH — ECONOMIC STABILITY: FOOD INSECURITY: WITHIN THE PAST 12 MONTHS, THE FOOD YOU BOUGHT JUST DIDN'T LAST AND YOU DIDN'T HAVE MONEY TO GET MORE.: NEVER TRUE

## 2021-01-08 SDOH — ECONOMIC STABILITY: FOOD INSECURITY: WITHIN THE PAST 12 MONTHS, YOU WORRIED THAT YOUR FOOD WOULD RUN OUT BEFORE YOU GOT MONEY TO BUY MORE.: NEVER TRUE

## 2021-01-08 NOTE — TELEPHONE ENCOUNTER
Patient called stating that she on the her lower part of her stomach, where her skin overlaps from her weight lose, is red, has an odor and painful. Can you call her in something for this?      Please call to advise     HEART OF [address], [address] [place] [address] [address] [facility]

## 2021-01-08 NOTE — PROGRESS NOTES
Aida Jones (:  1960) is a 62 y.o. female,Established patient, here for evaluation of the following chief complaint(s): Rash (L side)      ASSESSMENT/PLAN:  1. Candidal intertrigo  - treat with miconazole ointment BID, once healed should use barrier cream and keep area clean/dry  - may want to consider surgery to get rid of the excess skin     Return if symptoms worsen or fail to improve. SUBJECTIVE/OBJECTIVE:  HPI    Last week noticed odor and couldn't find where it was coming from. Today took a shower and in left lower abdomen under skin folds she has a painful red rash. Some skin breakdown is present. She has lost a lot of weight over the last year plus, down to 160 lbs now. Review of Systems   Skin: Positive for rash. Patient-Reported Vitals 2020   Patient-Reported Height 5'4''   Patient-Reported Systolic 924   Patient-Reported Diastolic 68   Patient-Reported Temperature 98.4        Physical Exam  Vitals signs reviewed. Constitutional:       General: She is not in acute distress. Appearance: Normal appearance. Pulmonary:      Effort: Pulmonary effort is normal. No respiratory distress. Skin:     Comments: Erythematous rash with superficial central ulceration located in the skin folds under left lower abdomen. Neurological:      Mental Status: She is alert. Psychiatric:         Mood and Affect: Mood normal.         Behavior: Behavior normal.         Thought Content:  Thought content normal.         Judgment: Judgment normal. Harjeet Tan is a 62 y.o. female being evaluated by a Virtual Visit (video visit) encounter to address concerns as mentioned above. A caregiver was present when appropriate. Due to this being a TeleHealth encounter ([facility]), evaluation of the following organ systems was limited: Vitals/Constitutional/EENT/Resp/CV/GI//MS/Neuro/Skin/Heme-Lymph-Imm. Pursuant to the emergency declaration under the [address] [place] [address] and the [employer] Act, this Virtual Visit was conducted with patient's (and/or legal guardian's) consent, to reduce the patient's risk of exposure to COVID-19 and provide necessary medical care. The patient (and/or legal guardian) has also been advised to contact this office for worsening conditions or problems, and seek emergency medical treatment and/or call 911 if deemed necessary. Patient identification was verified at the start of the visit: Yes      Services were provided through a video synchronous discussion virtually to substitute for in-person clinic visit. Patient and provider were located at their individual homes. An electronic signature was used to authenticate this note.     --Taylor Rosales MD

## 2021-01-08 NOTE — PROGRESS NOTES
Pt states she has a rash on her left side and is bleeding and has a funny smell. Pt noticed a smell end of last week. Showered and smell was gone but after a few hours the smell cam back. Pt stated it was under her stomach (lost weight). Possible infection.     VV confirmed as face-time via pt cell phone @ [phone]

## 2021-01-20 ENCOUNTER — VIRTUAL VISIT (OUTPATIENT)
Dept: INTERNAL MEDICINE CLINIC | Age: 61
End: 2021-01-20
Payer: COMMERCIAL

## 2021-01-20 DIAGNOSIS — I10 ESSENTIAL HYPERTENSION: ICD-10-CM

## 2021-01-20 DIAGNOSIS — Z79.4 TYPE 2 DIABETES MELLITUS WITHOUT COMPLICATION, WITH LONG-TERM CURRENT USE OF INSULIN (HCC): ICD-10-CM

## 2021-01-20 DIAGNOSIS — E11.9 TYPE 2 DIABETES MELLITUS WITHOUT COMPLICATION, WITH LONG-TERM CURRENT USE OF INSULIN (HCC): ICD-10-CM

## 2021-01-20 DIAGNOSIS — Z00.00 WELL ADULT EXAM: Primary | ICD-10-CM

## 2021-01-20 DIAGNOSIS — F43.23 SITUATIONAL MIXED ANXIETY AND DEPRESSIVE DISORDER: ICD-10-CM

## 2021-01-20 PROBLEM — E66.811 CLASS 1 OBESITY DUE TO EXCESS CALORIES WITH SERIOUS COMORBIDITY AND BODY MASS INDEX (BMI) OF 33.0 TO 33.9 IN ADULT: Status: RESOLVED | Noted: 2019-12-19 | Resolved: 2021-01-20

## 2021-01-20 PROBLEM — E66.09 CLASS 1 OBESITY DUE TO EXCESS CALORIES WITH SERIOUS COMORBIDITY AND BODY MASS INDEX (BMI) OF 33.0 TO 33.9 IN ADULT: Status: RESOLVED | Noted: 2019-12-19 | Resolved: 2021-01-20

## 2021-01-20 PROCEDURE — 99396 PREV VISIT EST AGE 40-64: CPT | Performed by: INTERNAL MEDICINE

## 2021-01-20 RX ORDER — ALPRAZOLAM 1 MG/1
1 TABLET ORAL NIGHTLY PRN
Qty: 90 TABLET | Refills: 0 | Status: SHIPPED | OUTPATIENT
Start: 2021-01-20 | End: 2021-04-06 | Stop reason: SDUPTHER

## 2021-01-20 ASSESSMENT — ENCOUNTER SYMPTOMS
BACK PAIN: 1
ABDOMINAL PAIN: 0
SHORTNESS OF BREATH: 0

## 2021-01-20 NOTE — PROGRESS NOTES
Maryann Avalos (:  1960) is a 62 y.o. female,Established patient, here for evaluation of the following chief complaint(s): Annual Exam      ASSESSMENT/PLAN:  1. Well adult exam  - Discussed age appropriate preventive care including healthy diet, daily exercise, immunizations and age & gender guided screening tests. 2. Type 2 diabetes mellitus without complication, with long-term current use of insulin ([facility] [address]  - improved, will follow up with endo  -     Comprehensive Metabolic Panel; Future  -     Lipid Panel; Future  -     CBC Auto Differential; Future  -     Hemoglobin A1C; Future  -     Microalbumin / Creatinine Urine Ratio; Future  3. Essential hypertension  - stable on ramipril  4. Situational mixed anxiety and depressive disorder  - stable  - OARRS reviewed  -     ALPRAZolam (XANAX) 1 MG tablet; Take 1 tablet by mouth nightly as needed for Sleep for up to 90 days. , Disp-90 tablet, R-0Normal  - continue sertraline      Return in about 3 months (around 2021) for anxiety. SUBJECTIVE/OBJECTIVE:  HPI    Due for gyn exam - last gynecologist retired    Last eye exam in Sept - no issues. Right now taking 15 units of lantus at night    Started to add back some shakes from the weight loss center    Using alprazolam for sleep as needed, denies side effects. Review of Systems   Constitutional: Negative for unexpected weight change. Eyes: Negative for visual disturbance. Respiratory: Negative for shortness of breath. Cardiovascular: Negative for chest pain and leg swelling. Gastrointestinal: Negative for abdominal pain. Musculoskeletal: Positive for back pain. Skin: Negative for rash. Neurological: Positive for numbness (left side) and headaches.        Patient-Reported Vitals 2021   Patient-Reported Weight 161.8lb   Patient-Reported Height 64in   Patient-Reported Systolic -   Patient-Reported Diastolic -   Patient-Reported Temperature 97.1        Physical Exam    [INSTRUCTIONS: \"[x]\" Indicates a positive item  \"[]\" Indicates a negative item  -- DELETE ALL ITEMS NOT EXAMINED]    Constitutional: [x] Appears well-developed and well-nourished [x] No apparent distress      [] Abnormal -     Mental status: [x] Alert and awake  [x] Oriented to person/place/time [x] Able to follow commands    [] Abnormal -     Eyes:   EOM    [x]  Normal    [] Abnormal -   Sclera  [x]  Normal    [] Abnormal -          Discharge [x]  None visible   [] Abnormal -     HENT: [x] Normocephalic, atraumatic  [] Abnormal -   [x] Mouth/Throat: Mucous membranes are moist    External Ears [x] Normal  [] Abnormal -    Neck: [x] No visualized mass [] Abnormal -     Pulmonary/Chest: [x] Respiratory effort normal   [x] No visualized signs of difficulty breathing or respiratory distress        [] Abnormal -      Musculoskeletal:   [x] Normal gait with no signs of ataxia         [x] Normal range of motion of neck        [] Abnormal -     Neurological:        [x] No Facial Asymmetry (Cranial nerve 7 motor function) (limited exam due to video visit)          [x] No gaze palsy        [] Abnormal -          Skin:        [x] No significant exanthematous lesions or discoloration noted on facial skin         [] Abnormal -            Psychiatric:       [x] Normal Affect [] Abnormal -        [x] No Hallucinations    Other pertinent observable physical exam findings:-                  Quoc Sommers is a 62 y.o. female being evaluated by a Virtual Visit (video visit) encounter to address concerns as mentioned above. A caregiver was present when appropriate. Due to this being a TeleHealth encounter (During [facility] public health emergency), evaluation of the following organ systems was limited: Vitals/Constitutional/EENT/Resp/CV/GI//MS/Neuro/Skin/Heme-Lymph-Imm.   Pursuant to the emergency declaration under the [address] waiver authority and the [employer] Act, this Virtual Visit was conducted with patient's (and/or legal guardian's) consent, to reduce the patient's risk of exposure to COVID-19 and provide necessary medical care. The patient (and/or legal guardian) has also been advised to contact this office for worsening conditions or problems, and seek emergency medical treatment and/or call 911 if deemed necessary. Patient identification was verified at the start of the visit: Yes    Services were provided through a video synchronous discussion virtually to substitute for in-person clinic visit. Patient was located at home and provider was located in office or at home. An electronic signature was used to authenticate this note.     --Yesi Greene MD

## 2021-02-12 ENCOUNTER — VIRTUAL VISIT (OUTPATIENT)
Dept: ENDOCRINOLOGY | Age: 61
End: 2021-02-12
Payer: COMMERCIAL

## 2021-02-12 ENCOUNTER — HOSPITAL ENCOUNTER (OUTPATIENT)
Age: 61
Discharge: HOME | End: 2021-02-12
Payer: COMMERCIAL

## 2021-02-12 DIAGNOSIS — Z79.4 TYPE 2 DIABETES MELLITUS WITHOUT COMPLICATION, WITH LONG-TERM CURRENT USE OF INSULIN (HCC): ICD-10-CM

## 2021-02-12 DIAGNOSIS — Z79.4 INSULIN LONG-TERM USE (HCC): Primary | ICD-10-CM

## 2021-02-12 DIAGNOSIS — E11.9 TYPE 2 DIABETES MELLITUS WITHOUT COMPLICATION, WITH LONG-TERM CURRENT USE OF INSULIN (HCC): ICD-10-CM

## 2021-02-12 DIAGNOSIS — E78.49 OTHER HYPERLIPIDEMIA: ICD-10-CM

## 2021-02-12 DIAGNOSIS — E55.9 VITAMIN D DEFICIENCY: ICD-10-CM

## 2021-02-12 DIAGNOSIS — E66.9 OBESITY (BMI 30-39.9): ICD-10-CM

## 2021-02-12 DIAGNOSIS — E11.65 UNCONTROLLED TYPE 2 DIABETES MELLITUS WITH HYPERGLYCEMIA (HCC): ICD-10-CM

## 2021-02-12 DIAGNOSIS — E53.8 B12 DEFICIENCY: ICD-10-CM

## 2021-02-12 LAB
A/G RATIO: 1.5 (ref 1.1–2.2)
ALBUMIN SERPL-MCNC: 4.4 G/DL (ref 3.4–5)
ALP BLD-CCNC: 61 U/L (ref 40–129)
ALT SERPL-CCNC: 11 U/L (ref 10–40)
ANION GAP SERPL CALCULATED.3IONS-SCNC: 13 MMOL/L (ref 3–16)
AST SERPL-CCNC: 16 U/L (ref 15–37)
BASOPHILS ABSOLUTE: 0 K/UL (ref 0–0.2)
BASOPHILS RELATIVE PERCENT: 0.4 %
BILIRUB SERPL-MCNC: 0.5 MG/DL (ref 0–1)
BUN BLDV-MCNC: 15 MG/DL (ref 7–20)
CALCIUM SERPL-MCNC: 10.1 MG/DL (ref 8.3–10.6)
CHLORIDE BLD-SCNC: 100 MMOL/L (ref 99–110)
CHOLESTEROL, TOTAL: 151 MG/DL (ref 0–199)
CO2: 24 MMOL/L (ref 21–32)
CREAT SERPL-MCNC: <0.5 MG/DL (ref 0.6–1.1)
CREATININE URINE: 141 MG/DL (ref 28–259)
EOSINOPHILS ABSOLUTE: 0.2 K/UL (ref 0–0.6)
EOSINOPHILS RELATIVE PERCENT: 2.4 %
ESTIMATED AVERAGE GLUCOSE: 165.7 MG/DL
GFR AFRICAN AMERICAN: >60
GFR NON-AFRICAN AMERICAN: >60
GLOBULIN: 3 G/DL
GLUCOSE BLD-MCNC: 153 MG/DL (ref 70–99)
HBA1C MFR BLD: 7.4 %
HCT VFR BLD CALC: 37.4 % (ref 36–48)
HDLC SERPL-MCNC: 69 MG/DL (ref 40–60)
HEMOGLOBIN: 12.1 G/DL (ref 12–16)
LDL CHOLESTEROL CALCULATED: 69 MG/DL
LYMPHOCYTES ABSOLUTE: 1.9 K/UL (ref 1–5.1)
LYMPHOCYTES RELATIVE PERCENT: 22.7 %
MCH RBC QN AUTO: 31 PG (ref 26–34)
MCHC RBC AUTO-ENTMCNC: 32.4 G/DL (ref 31–36)
MCV RBC AUTO: 95.7 FL (ref 80–100)
MICROALBUMIN UR-MCNC: 1.7 MG/DL
MICROALBUMIN/CREAT UR-RTO: 12.1 MG/G (ref 0–30)
MONOCYTES ABSOLUTE: 0.3 K/UL (ref 0–1.3)
MONOCYTES RELATIVE PERCENT: 3.8 %
NEUTROPHILS ABSOLUTE: 5.8 K/UL (ref 1.7–7.7)
NEUTROPHILS RELATIVE PERCENT: 70.7 %
PDW BLD-RTO: 12.1 % (ref 12.4–15.4)
PLATELET # BLD: 230 K/UL (ref 135–450)
PMV BLD AUTO: 8.8 FL (ref 5–10.5)
POTASSIUM SERPL-SCNC: 3.8 MMOL/L (ref 3.5–5.1)
RBC # BLD: 3.91 M/UL (ref 4–5.2)
SODIUM BLD-SCNC: 137 MMOL/L (ref 136–145)
TOTAL PROTEIN: 7.4 G/DL (ref 6.4–8.2)
TRIGL SERPL-MCNC: 63 MG/DL (ref 0–150)
VLDLC SERPL CALC-MCNC: 13 MG/DL
WBC # BLD: 8.2 K/UL (ref 4–11)

## 2021-02-12 PROCEDURE — 80061 LIPID PANEL: CPT

## 2021-02-12 PROCEDURE — 83036 HEMOGLOBIN GLYCOSYLATED A1C: CPT

## 2021-02-12 PROCEDURE — 82043 UR ALBUMIN QUANTITATIVE: CPT

## 2021-02-12 PROCEDURE — 80053 COMPREHEN METABOLIC PANEL: CPT

## 2021-02-12 PROCEDURE — 36415 COLL VENOUS BLD VENIPUNCTURE: CPT

## 2021-02-12 PROCEDURE — 82570 ASSAY OF URINE CREATININE: CPT

## 2021-02-12 PROCEDURE — 85025 COMPLETE CBC W/AUTO DIFF WBC: CPT

## 2021-02-12 PROCEDURE — 99215 OFFICE O/P EST HI 40 MIN: CPT | Performed by: INTERNAL MEDICINE

## 2021-02-12 RX ORDER — GEMFIBROZIL 600 MG/1
TABLET, FILM COATED ORAL
Qty: 180 TABLET | Refills: 1 | Status: SHIPPED | OUTPATIENT
Start: 2021-02-12 | End: 2021-08-11 | Stop reason: SDUPTHER

## 2021-02-12 RX ORDER — ATORVASTATIN CALCIUM 20 MG/1
TABLET, FILM COATED ORAL
Qty: 90 TABLET | Refills: 1 | Status: SHIPPED | OUTPATIENT
Start: 2021-02-12 | End: 2021-08-11 | Stop reason: SDUPTHER

## 2021-02-12 NOTE — PROGRESS NOTES
HPI        Pursuant to the emergency declaration under the [facility] waiver authority and the [employer] Act, this Virtual  Visit was conducted, with patient's consent, to reduce the patient's risk of exposure to COVID-19 and provide continuity of care for an established patient. Patient was at home. Provider was at home. No one else was involved  Services were provided through a video synchronous discussion virtually to substitute for in-person clinic visit. Richard Parker is a 62 y.o. female who is here for a follow-up for management of uncontrolled DM. PCP   Catie Castillo MD    Seen as new patient    Was previously seeing Dr. Bia Conner at the [facility]. Then saw Dr. Mela Del Rosario    Patient has a PMH of  DM, hypertension, hyperlipidemia, obesity     Diagnosed with Diabetes Mellitus Type 2  in 2001 after she had pancreatitis ( viral ). Course has been variable . Microvascular complications: No known retinopathy (Last eye exam: 2016), No nephropathy . No Peripheral neuropathy    Home regimen:  Metformin 850 mg BID                       Lantus insulin 15 units QHS        A1c 10.4 % ---> 7.9 %---> 11.7 % ---->10.9 %---> 9.1 %  ---> 7.6 % ---> 12.5 % ---> 10.5 % --->7.3 %     Glucose 97-100s    She has obesity                   Lost 50 lb  She also follows bariatric at [facility]    Previous Meds:   Humalog before meals. Actos 15 mg daily. Mild, uncontrolled, no worsening factors    Eye exam: 9/20    Hyperlipidemia: She has mixed hyperlipidemia  On Atorvastatin 20 mg daily, lopid 600 mg BID, fish oil 1 gram TID   Labs in 10/17 , 04/19  HDL 77---> 85  LDL 75---> 69  TGD 64---> 69---> 35    LDL 69 on 2/21     Hypertension: She has HTN  On Ramipril 10 mg daily.      She has h/o vit D deficiency, controlled    H/o B12 deficiency, controlled    FH:   Dad DM    SH: No smoking    Past Medical History:   Diagnosis Date    Allergic rhinitis     Asthma     H/O- stress related    Diabetes mellitus, type 1     History of blood transfusion 2001?  Hyperlipidemia     Other acute reactions to stress     Pancreatitis 2000    Rectal abscess     II     Past Surgical History:   Procedure Laterality Date    BACK SURGERY N/A 07/29/2020    COLONOSCOPY      HYSTERECTOMY      KNEE ARTHROSCOPY Right 2014 x2    Dr. Manolo Hicks ARTHROSCOPY Left 2015    OTHER SURGICAL HISTORY  6/1/16    EUS, no interventions    OTHER SURGICAL HISTORY  6/2/16    EXAMINATION UNDER ANESTHESIA, EXCISION OF ANAL MASS    OVARY REMOVAL Bilateral      Current Outpatient Medications   Medication Sig Dispense Refill    ALPRAZolam (XANAX) 1 MG tablet Take 1 tablet by mouth nightly as needed for Sleep for up to 90 days. 90 tablet 0    miconazole nitrate 2 % OINT Apply topically 2 times daily 30 g 1    ramipril (ALTACE) 10 MG capsule TAKE 1 CAPSULE DAILY FOR BLOOD PRESSURE 90 capsule 3    sertraline (ZOLOFT) 50 MG tablet TAKE 1 TABLET DAILY FOR MOOD 90 tablet 3    metFORMIN (GLUCOPHAGE) 850 MG tablet TAKE 1 TABLET TWICE A DAY WITH MEALS 60 tablet 0    gemfibrozil (LOPID) 600 MG tablet TAKE 1 TABLET TWICE A DAY FOR TRIGLYCERIDES 60 tablet 0    atorvastatin (LIPITOR) 20 MG tablet TAKE 1 TABLET DAILY 30 tablet 0    LANTUS SOLOSTAR 100 UNIT/ML injection pen INJECT 50 UNITS UNDER THE SKIN ONCE NIGHTLY 5 pen 0    gabapentin (NEURONTIN) 100 MG capsule Take 100 mg by mouth 3 times daily.  oxyCODONE-acetaminophen (PERCOCET) 5-325 MG per tablet Take 2 tablets by mouth every 6 hours as needed. Pt states she is out of Percocet at this time      diazePAM (VALIUM) 5 MG tablet Take 5 mg by mouth every 6 hours as needed.        Insulin Pen Needle (B-D UF III MINI PEN NEEDLES) 31G X 5 MM MISC Inject insulin 4 times a day 120 each 6    ONETOUCH DELICA LANCETS 98H MISC 1 each by Does not apply route 3 times daily (before meals) 100 each 11    Glucosamine-Chondroitin (GLUCOSAMINE CHONDR COMPLEX PO) Take by mouth daily       No current facility-administered medications for this visit. Review of Systems   Constitutional: Positive for malaise/fatigue. + for weight loss. HENT: Negative for sore throat. Eyes: Negative for blurred vision. Respiratory: Negative for cough and shortness of breath. Cardiovascular: Negative for chest pain and palpitations. Gastrointestinal: Negative for abdominal pain, heartburn, nausea and vomiting. Genitourinary: Negative for frequency and urgency. Musculoskeletal: Positive for joint pain. Negative for back pain and myalgias. Skin: Negative for rash. Neurological: Positive for tingling and sensory change. Negative for headaches. Endo/Heme/Allergies: Negative for polydipsia. Psychiatric/Behavioral: Negative for depression. The patient is not nervous/anxious. No other change in symptoms        PHYSICAL EXAMINATION:  [ INSTRUCTIONS:  \"[x]\" Indicates a positive item  \"[]\" Indicates a negative item  -- DELETE ALL ITEMS NOT EXAMINED]  Vital Signs: (As obtained by patient/caregiver or practitioner observation)  Wt 162lb  Blood pressure-  Heart rate-    Respiratory rate- 14   Temperature-  Pulse oximetry-     Constitutional Appears well-developed and well-nourished No apparent distress        Mental status  Alert and awake  Oriented to person/place/time  Able to follow commands      Eyes:  EOM    [x]  Normal    Sclera  [x]  Normal           Discharge [x]  None visible      HENT:   [x] Normocephalic, atraumatic.     [x] Mouth/Throat: Mucous membranes are moist.     External Ears [x] Normal  no discharge    Neck: [x] No visualized mass  no swelling    Pulmonary/Chest: [x] Respiratory effort normal.  [x] No visualized signs of difficulty breathing or respiratory distress             Musculoskeletal:   [x] Normal gait with no signs of ataxia         [x] Normal range of motion of neck 1.  DM     Giacomo Frye is a 62 y.o. female has DM with obesity and insulin resistance. Control has improved. She had pancreatitis in the past.  c-peptide is low  . She is on a special diet and has lost weight  Off humalog /actos    -Continue metformin  - Lantus insulin 15 units QHS  Await A1c      Advised follow-up with the ophthalmologist once year. Urine microalbumin/cr ratio normal in 10/16. High in 04/19. On Altace. ASA 81 mg daily. TSH was normal in 10/16, 04/19    2. Hypertension. BP at goal.     3. Hyperlipidemia. On statins and fibrates. LDL at goal    4. Vitamin D def. On replacement. 5000 IU daily  Vit D 62.9 in 04/18---> 55.8    5. Vit B12 def. On replacement. Advised need level checked    6. Obesity: [facility] [id number], [facility] weight management    F/u 6 months, see PCP in 3 months

## 2021-03-12 ENCOUNTER — IMMUNIZATION (OUTPATIENT)
Dept: PRIMARY CARE CLINIC | Age: 61
End: 2021-03-12
Payer: COMMERCIAL

## 2021-03-12 PROCEDURE — 0001A COVID-19, PFIZER VACCINE 30MCG/0.3ML DOSE: CPT | Performed by: FAMILY MEDICINE

## 2021-03-12 PROCEDURE — 91300 COVID-19, PFIZER VACCINE 30MCG/0.3ML DOSE: CPT | Performed by: FAMILY MEDICINE

## 2021-04-02 ENCOUNTER — IMMUNIZATION (OUTPATIENT)
Dept: PRIMARY CARE CLINIC | Age: 61
End: 2021-04-02
Payer: COMMERCIAL

## 2021-04-02 PROCEDURE — 0002A COVID-19, PFIZER VACCINE 30MCG/0.3ML DOSE: CPT | Performed by: FAMILY MEDICINE

## 2021-04-02 PROCEDURE — 91300 COVID-19, PFIZER VACCINE 30MCG/0.3ML DOSE: CPT | Performed by: FAMILY MEDICINE

## 2021-04-05 ENCOUNTER — HOSPITAL ENCOUNTER (OUTPATIENT)
Dept: MAMMOGRAPHY | Age: 61
Discharge: HOME | End: 2021-04-05
Payer: COMMERCIAL

## 2021-04-05 ENCOUNTER — OFFICE VISIT (OUTPATIENT)
Dept: GYNECOLOGY | Age: 61
End: 2021-04-05
Payer: COMMERCIAL

## 2021-04-05 VITALS
HEART RATE: 93 BPM | RESPIRATION RATE: 17 BRPM | BODY MASS INDEX: 30.25 KG/M2 | SYSTOLIC BLOOD PRESSURE: 141 MMHG | DIASTOLIC BLOOD PRESSURE: 86 MMHG | WEIGHT: 177.2 LBS | HEIGHT: 64 IN | TEMPERATURE: 98 F

## 2021-04-05 DIAGNOSIS — E65 PANNUS, ABDOMINAL: ICD-10-CM

## 2021-04-05 DIAGNOSIS — Z01.419 WELL WOMAN EXAM WITH ROUTINE GYNECOLOGICAL EXAM: ICD-10-CM

## 2021-04-05 DIAGNOSIS — Z01.419 WELL WOMAN EXAM WITH ROUTINE GYNECOLOGICAL EXAM: Primary | ICD-10-CM

## 2021-04-05 DIAGNOSIS — L91.8 SKIN TAG: ICD-10-CM

## 2021-04-05 PROCEDURE — 99386 PREV VISIT NEW AGE 40-64: CPT | Performed by: OBSTETRICS & GYNECOLOGY

## 2021-04-05 PROCEDURE — 77063 BREAST TOMOSYNTHESIS BI: CPT

## 2021-04-06 DIAGNOSIS — F43.23 SITUATIONAL MIXED ANXIETY AND DEPRESSIVE DISORDER: ICD-10-CM

## 2021-04-06 RX ORDER — ALPRAZOLAM 1 MG/1
1 TABLET ORAL NIGHTLY PRN
Qty: 14 TABLET | Refills: 0 | Status: SHIPPED | OUTPATIENT
Start: 2021-04-06 | End: 2021-04-19 | Stop reason: SDUPTHER

## 2021-04-06 NOTE — TELEPHONE ENCOUNTER
Patient called to request medication refill. Patient also has a VV scheduled for 4/19. ALPRAZolam Ba [place] 1 MG tablet     [facility] [address] [place] [address]        Please call and advise.

## 2021-04-11 ASSESSMENT — ENCOUNTER SYMPTOMS
RESPIRATORY NEGATIVE: 1
EYES NEGATIVE: 1
GASTROINTESTINAL NEGATIVE: 1

## 2021-04-11 NOTE — PROGRESS NOTES
Subjective:      Patient ID: Kerrie Pineda is a 62 y.o. female. Patient is here for annual. Patient in menopause. Patient with questions about pannus and infection under pannus. Wants removed. Review of Systems   Constitutional: Negative. HENT: Negative. Eyes: Negative. Respiratory: Negative. Cardiovascular: Negative. Gastrointestinal: Negative. Genitourinary: Negative. Musculoskeletal: Negative. Skin: Negative. Neurological: Negative. Psychiatric/Behavioral: Negative. Date of Birth 1960  Past Medical History:   Diagnosis Date    Allergic rhinitis     Asthma     H/O- stress related    Diabetes mellitus, type 1     Fibroid     History of blood transfusion ?     Hyperlipidemia     Menopause ovarian failure     Other acute reactions to stress     Pancreatitis     Rectal abscess     II     Past Surgical History:   Procedure Laterality Date    BACK SURGERY N/A 2020    COLONOSCOPY      HYSTERECTOMY      HYSTERECTOMY, TOTAL ABDOMINAL      KNEE ARTHROSCOPY Right 2014 x2    Dr. Lomax Dire ARTHROSCOPY Left 2015    OTHER SURGICAL HISTORY  16    EUS, no interventions    OTHER SURGICAL HISTORY  16    EXAMINATION UNDER ANESTHESIA, EXCISION OF ANAL MASS    OVARY REMOVAL Bilateral     TUBAL LIGATION       OB History    Para Term  AB Living   0 0 0 0 0 0   SAB TAB Ectopic Molar Multiple Live Births   0 0 0 0 0 0     Social History     Socioeconomic History    Marital status: Single     Spouse name: Not on file    Number of children: Not on file    Years of education: Not on file    Highest education level: Not on file   Occupational History    Not on file   Social Needs    Financial resource strain: Not hard at all   [place] insecurity     Worry: Never true     Inability: Never true   [employer] needs     Medical: No     Non-medical: No   Tobacco Use    Smoking status: Never Smoker    Smokeless tobacco: Never Used   Substance and Sexual Activity    Alcohol use: Yes     Comment: socially     Drug use: No    Sexual activity: Yes     Partners: Male   Lifestyle    Physical activity     Days per week: Not on file     Minutes per session: Not on file    Stress: Not on file   Relationships    Social connections     Talks on phone: Not on file     Gets together: Not on file     Attends Sikh service: Not on file     Active member of club or organization: Not on file     Attends meetings of clubs or organizations: Not on file     Relationship status: Not on file    Intimate partner violence     Fear of current or ex partner: Not on file     Emotionally abused: Not on file     Physically abused: Not on file     Forced sexual activity: Not on file   Other Topics Concern    Not on file   Social History Narrative    Not on file     Allergies   Allergen Reactions    Morphine Other (See Comments)     Unsure of reaction; Pt says she got mean      Outpatient Medications Marked as Taking for the 4/5/21 encounter (Office Visit) with Idalia Cross MD   Medication Sig Dispense Refill    atorvastatin (LIPITOR) 20 MG tablet TAKE 1 TABLET DAILY 90 tablet 1    gemfibrozil (LOPID) 600 MG tablet TAKE 1 TABLET TWICE A DAY FOR TRIGLYCERIDES 180 tablet 1    metFORMIN (GLUCOPHAGE) 850 MG tablet TAKE 1 TABLET TWICE A DAY WITH MEALS 180 tablet 1    [DISCONTINUED] ALPRAZolam (XANAX) 1 MG tablet Take 1 tablet by mouth nightly as needed for Sleep for up to 90 days.  90 tablet 0    ramipril (ALTACE) 10 MG capsule TAKE 1 CAPSULE DAILY FOR BLOOD PRESSURE 90 capsule 3    sertraline (ZOLOFT) 50 MG tablet TAKE 1 TABLET DAILY FOR MOOD 90 tablet 3    LANTUS SOLOSTAR 100 UNIT/ML injection pen INJECT 50 UNITS UNDER THE SKIN ONCE NIGHTLY 5 pen 0    Insulin Pen Needle (B-D UF III MINI PEN NEEDLES) 31G X 5 MM MISC Inject insulin 4 times a day 120 each 6    ONETOUCH DELICA LANCETS 16U MISC 1 each by Does not apply route 3 times daily (before meals) 100 each 11    Glucosamine-Chondroitin (GLUCOSAMINE CHONDR COMPLEX PO) Take by mouth daily       Family History   Problem Relation Age of Onset    Heart Disease Mother     Diabetes Father      BP (!) 141/86 (Site: Right Upper Arm, Position: Sitting, Cuff Size: Large Adult)   Pulse 93   Temp 98 °F (36.7 °C)   Resp 17   Ht 5' 4\" (1.626 m)   Wt 177 lb 3.2 oz (80.4 kg)   Breastfeeding No   BMI 30.42 kg/m²       Objective:   Physical Exam  Constitutional:       General: She is not in acute distress. Appearance: Normal appearance. She is well-developed and normal weight. She is not diaphoretic. HENT:      Head: Normocephalic. Nose: Nose normal.      Mouth/Throat:      Mouth: Mucous membranes are moist.      Pharynx: Oropharynx is clear. Eyes:      Pupils: Pupils are equal, round, and reactive to light. Neck:      Musculoskeletal: Normal range of motion and neck supple. No neck rigidity. Thyroid: No thyromegaly. Cardiovascular:      Rate and Rhythm: Normal rate and regular rhythm. Heart sounds: Normal heart sounds. No murmur. No friction rub. No gallop. Pulmonary:      Effort: Pulmonary effort is normal. No respiratory distress. Breath sounds: Normal breath sounds. No wheezing or rales. Chest:      Chest wall: No tenderness. Breasts:         Right: No swelling, bleeding, inverted nipple, mass, nipple discharge, skin change or tenderness. Left: No swelling, bleeding, inverted nipple, mass, nipple discharge, skin change or tenderness. Abdominal:      General: Abdomen is flat. There is no distension. Palpations: Abdomen is soft. There is no hepatomegaly or mass. Tenderness: There is no abdominal tenderness. There is no guarding or rebound. Hernia: No hernia is present. There is no hernia in the left inguinal area. Genitourinary:     Exam position: Lithotomy position. Labia:         Right: No rash, tenderness, lesion or injury.

## 2021-04-14 ENCOUNTER — TELEPHONE (OUTPATIENT)
Dept: ENDOCRINOLOGY | Age: 61
End: 2021-04-14

## 2021-04-14 NOTE — TELEPHONE ENCOUNTER
Pharmacy called and said they received the escribe fax back from the office and they called and stated that they have tried and it is having communication errors so It sends a fax.  They asked that someone call this in for the patient

## 2021-04-15 RX ORDER — INSULIN GLARGINE 100 [IU]/ML
INJECTION, SOLUTION SUBCUTANEOUS
Qty: 15 PEN | Refills: 0 | Status: SHIPPED | OUTPATIENT
Start: 2021-04-15 | End: 2021-08-11 | Stop reason: SDUPTHER

## 2021-04-15 NOTE — TELEPHONE ENCOUNTER
Patient called the office and said her insurance does not cover the basaglar.   She wants to know if dr. Zonia Garcia will call in [facility]

## 2021-04-19 ENCOUNTER — VIRTUAL VISIT (OUTPATIENT)
Dept: INTERNAL MEDICINE CLINIC | Age: 61
End: 2021-04-19
Payer: COMMERCIAL

## 2021-04-19 DIAGNOSIS — F43.23 SITUATIONAL MIXED ANXIETY AND DEPRESSIVE DISORDER: Primary | ICD-10-CM

## 2021-04-19 DIAGNOSIS — Z79.4 TYPE 2 DIABETES MELLITUS WITHOUT COMPLICATION, WITH LONG-TERM CURRENT USE OF INSULIN (HCC): ICD-10-CM

## 2021-04-19 DIAGNOSIS — E11.9 TYPE 2 DIABETES MELLITUS WITHOUT COMPLICATION, WITH LONG-TERM CURRENT USE OF INSULIN (HCC): ICD-10-CM

## 2021-04-19 PROCEDURE — 99213 OFFICE O/P EST LOW 20 MIN: CPT | Performed by: INTERNAL MEDICINE

## 2021-04-19 PROCEDURE — 3051F HG A1C>EQUAL 7.0%<8.0%: CPT | Performed by: INTERNAL MEDICINE

## 2021-04-19 RX ORDER — ALPRAZOLAM 1 MG/1
1 TABLET ORAL NIGHTLY PRN
Qty: 90 TABLET | Refills: 0 | Status: SHIPPED | OUTPATIENT
Start: 2021-04-19 | End: 2021-07-12 | Stop reason: SDUPTHER

## 2021-04-19 RX ORDER — INSULIN GLARGINE 100 [IU]/ML
50 INJECTION, SOLUTION SUBCUTANEOUS NIGHTLY
Qty: 15 PEN | Refills: 0 | Status: SHIPPED | OUTPATIENT
Start: 2021-04-19 | End: 2021-06-14

## 2021-04-19 ASSESSMENT — PATIENT HEALTH QUESTIONNAIRE - PHQ9
SUM OF ALL RESPONSES TO PHQ QUESTIONS 1-9: 0
SUM OF ALL RESPONSES TO PHQ9 QUESTIONS 1 & 2: 0
2. FEELING DOWN, DEPRESSED OR HOPELESS: 0
1. LITTLE INTEREST OR PLEASURE IN DOING THINGS: 0
SUM OF ALL RESPONSES TO PHQ QUESTIONS 1-9: 0

## 2021-04-19 NOTE — PROGRESS NOTES
Kerrie Pineda (:  1960) is a 62 y.o. female,Established patient, here for evaluation of the following chief complaint(s): Anxiety      ASSESSMENT/PLAN:  1. Situational mixed anxiety and depressive disorder  -Stable  - OARRS reviewed, no issues  -     ALPRAZolam (XANAX) 1 MG tablet; Take 1 tablet by mouth nightly as needed for Sleep for up to 90 days. , Disp-90 tablet, R-0Normal  2. Type 2 diabetes mellitus without complication, with long-term current use of insulin (HCC)   -Stable, working on weight loss, continue current medications, managed by Dr. Angie Watkins    Return in about 3 months (around 2021) for anxiety. SUBJECTIVE/OBJECTIVE:  HPI    Anxiety -overall she has been doing pretty well. She takes alprazolam at bedtime for sleep, couple times she still not able to sleep and is not sure if it is more pain related. She has been doing very well with recovering from her back surgery, completed PT and is moving a lot better. She is still working on losing weight, she gained back about 5 pounds or so and is working on losing them again. Sugars have been really well controlled recently. Seeing endocrinologist.    Review of Systems    Patient-Reported Vitals 2021   Patient-Reported Weight 161.8lb   Patient-Reported Height 64in   Patient-Reported Systolic -   Patient-Reported Diastolic -   Patient-Reported Temperature 97.1        Physical Exam  Constitutional:       General: She is not in acute distress. Appearance: Normal appearance. HENT:      Head: Normocephalic and atraumatic. Pulmonary:      Effort: Pulmonary effort is normal. No respiratory distress. Neurological:      Mental Status: She is alert. Psychiatric:         Mood and Affect: Mood normal.         Behavior: Behavior normal.         Thought Content: Thought content normal.         Judgment: Judgment normal.                   Kerrie Pineda, was evaluated through a synchronous (real-time) audio-video encounter.  The patient (or

## 2021-04-19 NOTE — TELEPHONE ENCOUNTER
Patient called and said she would like to lantus sent to [facility] not [facility]       Clay De La Torre [address] [place] [address], [address] [place] [address] [phone]

## 2021-06-12 DIAGNOSIS — E11.65 UNCONTROLLED TYPE 2 DIABETES MELLITUS WITH HYPERGLYCEMIA (HCC): Primary | ICD-10-CM

## 2021-06-14 RX ORDER — INSULIN GLARGINE 100 [IU]/ML
15 INJECTION, SOLUTION SUBCUTANEOUS NIGHTLY
Qty: 5 PEN | Refills: 3 | Status: SHIPPED | OUTPATIENT
Start: 2021-06-14

## 2021-06-14 NOTE — TELEPHONE ENCOUNTER
Medication:   Requested Prescriptions     Pending Prescriptions Disp Refills    BASAGLAR KWIKPEN 100 UNIT/ML injection pen [Pharmacy Med Name: Naomi Jenkins 100 UNIT/ML KWIKPEN] 5 pen 0     Sig: INJECT 50 UNITS UNDER THE SKIN NIGHTLY         Last appt: 2/12/2021   Next appt: due 08/2021    Last Labs DM:   Lab Results   Component Value Date    LABA1C 7.4 02/12/2021

## 2021-07-12 ENCOUNTER — VIRTUAL VISIT (OUTPATIENT)
Dept: INTERNAL MEDICINE CLINIC | Age: 61
End: 2021-07-12
Payer: COMMERCIAL

## 2021-07-12 DIAGNOSIS — Z79.4 TYPE 2 DIABETES MELLITUS WITHOUT COMPLICATION, WITH LONG-TERM CURRENT USE OF INSULIN (HCC): ICD-10-CM

## 2021-07-12 DIAGNOSIS — I10 ESSENTIAL HYPERTENSION: ICD-10-CM

## 2021-07-12 DIAGNOSIS — M54.9 BACK PAIN, UNSPECIFIED BACK LOCATION, UNSPECIFIED BACK PAIN LATERALITY, UNSPECIFIED CHRONICITY: ICD-10-CM

## 2021-07-12 DIAGNOSIS — F43.23 SITUATIONAL MIXED ANXIETY AND DEPRESSIVE DISORDER: Primary | ICD-10-CM

## 2021-07-12 DIAGNOSIS — E11.9 TYPE 2 DIABETES MELLITUS WITHOUT COMPLICATION, WITH LONG-TERM CURRENT USE OF INSULIN (HCC): ICD-10-CM

## 2021-07-12 PROCEDURE — 99214 OFFICE O/P EST MOD 30 MIN: CPT | Performed by: INTERNAL MEDICINE

## 2021-07-12 PROCEDURE — 3051F HG A1C>EQUAL 7.0%<8.0%: CPT | Performed by: INTERNAL MEDICINE

## 2021-07-12 RX ORDER — ALPRAZOLAM 1 MG/1
1 TABLET ORAL NIGHTLY PRN
Qty: 90 TABLET | Refills: 0 | Status: SHIPPED | OUTPATIENT
Start: 2021-07-12 | End: 2021-10-10

## 2021-07-12 ASSESSMENT — PATIENT HEALTH QUESTIONNAIRE - PHQ9
SUM OF ALL RESPONSES TO PHQ QUESTIONS 1-9: 0
SUM OF ALL RESPONSES TO PHQ QUESTIONS 1-9: 0
1. LITTLE INTEREST OR PLEASURE IN DOING THINGS: 0
2. FEELING DOWN, DEPRESSED OR HOPELESS: 0
SUM OF ALL RESPONSES TO PHQ9 QUESTIONS 1 & 2: 0
SUM OF ALL RESPONSES TO PHQ QUESTIONS 1-9: 0

## 2021-07-12 NOTE — PROGRESS NOTES
Benjamín Palacios (:  1960) is a 62 y.o. female,Established patient, here for evaluation of the following chief complaint(s): Anxiety         ASSESSMENT/PLAN:  1. Situational mixed anxiety and depressive disorder  -Stable  - OARRS reviewed, no concerns  - continue sertraline 50mg daily  -     ALPRAZolam (XANAX) 1 MG tablet; Take 1 tablet by mouth nightly as needed for Sleep for up to 90 days. , Disp-90 tablet, R-0Normal  2. Type 2 diabetes mellitus without complication, with long-term current use of insulin (HCC)   -Uncontrolled, working on weight loss, will be seeing Endo. Continue current medications  3. Essential hypertension   -High at her last visit with weight management but had been normal just a week prior, monitor, if persistently high then would adjust medication   - continue ramipril 10mg daily  4. Back pain, unspecified back location, unspecified back pain laterality, unspecified chronicity   -Improved, continue PT exercises    Return in about 3 months (around 10/12/2021) for anxiety. SUBJECTIVE/OBJECTIVE:  HPI    Back is doing much better, she completed a course of PT  Gained some weight back - now back into weight management, she has lost 6 pounds so far. Her A1c was 7.9 recently, had been 7.4 prior to that. She will be seeing a new endocrinologist.  Using alprazolam nightly -typically it works for sleep. She denies any side effects. No dizziness or falls. Review of Systems    Patient-Reported Vitals 2021   Patient-Reported Weight 182 lbs   Patient-Reported Height 5 4   Patient-Reported Systolic (No Data)   Patient-Reported Diastolic (No Data)   Patient-Reported Temperature -        Physical Exam  Vitals reviewed. Constitutional:       General: She is not in acute distress. Appearance: Normal appearance. She is well-developed. Pulmonary:      Effort: Pulmonary effort is normal. No respiratory distress. Neurological:      Mental Status: She is alert.    Psychiatric: Mood and Affect: Mood is anxious. Behavior: Behavior normal.         Thought Content: Thought content normal.         Judgment: Judgment normal.                   Juice Wilkinson, was evaluated through a synchronous (real-time) audio-video encounter. The patient (or guardian if applicable) is aware that this is a billable service. Verbal consent to proceed has been obtained within the past 12 months. The visit was conducted pursuant to the emergency declaration under the [address] [place] [address] and the [employer] Act. Patient identification was verified, and a caregiver was present when appropriate. The patient was located in a state where the provider was credentialed to provide care. An electronic signature was used to authenticate this note.     --Tate Presley MD

## 2021-07-27 DIAGNOSIS — E78.49 OTHER HYPERLIPIDEMIA: ICD-10-CM

## 2021-07-27 DIAGNOSIS — E11.65 UNCONTROLLED TYPE 2 DIABETES MELLITUS WITH HYPERGLYCEMIA (HCC): ICD-10-CM

## 2021-07-28 ENCOUNTER — TELEPHONE (OUTPATIENT)
Dept: SURGERY | Age: 61
End: 2021-07-28

## 2021-07-28 ENCOUNTER — OFFICE VISIT (OUTPATIENT)
Dept: SURGERY | Age: 61
End: 2021-07-28
Payer: COMMERCIAL

## 2021-07-28 VITALS
HEART RATE: 100 BPM | BODY MASS INDEX: 31.89 KG/M2 | DIASTOLIC BLOOD PRESSURE: 90 MMHG | OXYGEN SATURATION: 98 % | HEIGHT: 64 IN | SYSTOLIC BLOOD PRESSURE: 156 MMHG | WEIGHT: 186.8 LBS

## 2021-07-28 DIAGNOSIS — R10.9 RIGHT SIDED ABDOMINAL PAIN: Primary | ICD-10-CM

## 2021-07-28 DIAGNOSIS — M79.3 PANNICULITIS: ICD-10-CM

## 2021-07-28 PROCEDURE — 99204 OFFICE O/P NEW MOD 45 MIN: CPT | Performed by: SURGERY

## 2021-07-28 RX ORDER — GEMFIBROZIL 600 MG/1
TABLET, FILM COATED ORAL
Qty: 180 TABLET | Refills: 3 | OUTPATIENT
Start: 2021-07-28

## 2021-07-28 RX ORDER — ATORVASTATIN CALCIUM 20 MG/1
TABLET, FILM COATED ORAL
Qty: 90 TABLET | Refills: 3 | OUTPATIENT
Start: 2021-07-28

## 2021-07-28 NOTE — TELEPHONE ENCOUNTER
Called patient to discuss contrast. We have no availability to determine contrast .      CMP ordered.  DONE

## 2021-07-28 NOTE — PROGRESS NOTES
[facility]    Consultation requested by: Ellen Dodd MD    CC: Body contouring    HPI: 62 y.o. female with a PMHx as delineated below who presents in consultation for body contouring. She has lost a total of 30 lbs with [address] 27 weight management (7/20). She underwent subsequent back surgery and has had developed significant problems with rash and drainage requiring antibiotics both oral and topical.  She notes that this has worsened over the past several months and she is now having significant pain that is limiting her activities. She was seen by both her PCP and gynecologist and was referred to plastic surgery for evaluation. Additionally, she notes some lateral abdominal pain that is fairly constant. She notes that she has not been having any difficulties with PO or bowel movements. Bariatric surgery: No  Max weight (lbs): 218  Lowest weight (lbs): 165  Current (lbs): 186  Weight change in the past 3 months: Yes (losing weight)  Max BMI: 38  Current BMI: 32.06    History of DVT/PE: No  Excess skin cover genitals: Yes    Previous body contouring procedures: No   Smoking: No  Pregnancy / Miscarriage: 0 / 0    Past Medical History:   Diagnosis Date    Allergic rhinitis     Asthma     H/O- stress related    Diabetes mellitus, type 1     Fibroid     History of blood transfusion 2001?     Hyperlipidemia     Menopause ovarian failure     Other acute reactions to stress     Pancreatitis 2000    Rectal abscess     II   HgbA1c - 7.9 (6/21)    Past Surgical History:   Procedure Laterality Date    BACK SURGERY N/A 07/29/2020    COLONOSCOPY      HYSTERECTOMY      HYSTERECTOMY, TOTAL ABDOMINAL      KNEE ARTHROSCOPY Right 2014 x2    Dr. Benja Hernandez ARTHROSCOPY Left 2015    OTHER SURGICAL HISTORY  6/1/16    EUS, no interventions    OTHER SURGICAL HISTORY  6/2/16    EXAMINATION UNDER ANESTHESIA, EXCISION OF ANAL MASS    OVARY REMOVAL Bilateral     TUBAL LIGATION       Social History     Socioeconomic History    Marital status: Single     Spouse name: Not on file    Number of children: Not on file    Years of education: Not on file    Highest education level: Not on file   Occupational History    Not on file   Tobacco Use    Smoking status: Never Smoker    Smokeless tobacco: Never Used   Vaping Use    Vaping Use: Never used   Substance and Sexual Activity    Alcohol use: Yes     Comment: socially     Drug use: No    Sexual activity: Yes     Partners: Male   Other Topics Concern    Not on file   Social History Narrative    Not on file     Social Determinants of Health     Financial Resource Strain: Low Risk     Difficulty of Paying Living Expenses: Not hard at all   Food Insecurity: No Food Insecurity    Worried About Running Out of Food in the Last Year: Never true    Jamie of Food in the Last Year: Never true   Transportation Needs: No Transportation Needs    Lack of Transportation (Medical): No    Lack of Transportation (Non-Medical):  No   Physical Activity:     Days of Exercise per Week:     Minutes of Exercise per Session:    Stress:     Feeling of Stress :    Social Connections:     Frequency of Communication with Friends and Family:     Frequency of Social Gatherings with Friends and Family:     Attends Oriental orthodox Services:     Active Member of Clubs or Organizations:     Attends Club or Organization Meetings:     Marital Status:    Intimate Partner Violence:     Fear of Current or Ex-Partner:     Emotionally Abused:     Physically Abused:     Sexually Abused:      Family History   Problem Relation Age of Onset    Heart Disease Mother     Diabetes Father        Allergy:   Allergies   Allergen Reactions    Morphine Other (See Comments)     Unsure of reaction; Pt says she got mean        ROS History obtained from the patient  General ROS: negative  Psychological ROS: negative  Ophthalmic ROS: negative  Neurological ROS: negative  ENT ROS: negative  Allergy and Immunology ROS: negative  Hematological and Lymphatic ROS: negative  Endocrine ROS: negative  Respiratory ROS: negative  Cardiovascular ROS: negative  Gastrointestinal ROS: See HPI  Genito-Urinary ROS: negative  Musculoskeletal ROS: negative   Skin ROS: See HPI. EXAM    BP (!) 156/90   Pulse 100   Ht 5' 4\" (1.626 m)   Wt 186 lb 12.8 oz (84.7 kg)   SpO2 98%   BMI 32.06 kg/m²     GEN: NAD, pleasant, obese  CVS: RRR  PULM: No respiratory distress  HEENT: PERRLA/EOMI; dentition (wearing mask), hearing appears within normal limits  NECK: Supple with trachea in midline, no masses  ABD: soft/TTP on the R abdomen / obese  Grade 2 pannus  EXT: No lymphedema noted  NEURO: No focal deficits, no obvious CN deficits    IMP: 62 y.o. female with panniculitis desire for body contouring  PLAN: Will obtain CT imaging given her abdominal pain. Would likely benefit from panniculectomy, however will need to have improved glycemic control prior to any surgical intervention. We discussed that she is going to work with her new endocrinology team and once improved, will submit to insurance and schedule. The complexity of body contouring procedures and wound healing physiology were discussed with the patient. She was counseled on ideal medical optimization (nutrition, weight stability, etc.). We also discussed the pros & cons of staging for multiple procedures including controlling tension from various sites and postoperative care managment. Clinical photos were obtained. The risks, benefits, alternatives, outcomes, personnel involved were discussed and all questions were answered in a satisfactory manner according to the patient. Specifically,the risks including, but not limited to: bleeding possibly requiring transfusion orreoperation, infection, seroma, nonhealing of wounds, poor cosmetic outcome, scarring, VTE (DVT/PE), and death were discussed.      The patient was counseled at length about the risks of natanael Covid-19 during their perioperative period and any recovery window from their procedure. The patient was made aware that natanael Covid-19  may worsen their prognosis for recovering from their procedure  and lend to a higher morbidity and/or mortality risk. All material risks, benefits, and reasonable alternatives including postponing the procedure were discussed. The patient does wish to proceed with the procedure at this time.     Terrance Melo MD  [address] [place] [address] Reconstructive Surgery  [phone]  07/28/21

## 2021-07-28 NOTE — TELEPHONE ENCOUNTER
Patient called in stating she is not able to drink the chalky white contrast for the CT. She said she can have the clear but it be must be cherry flavored. Her CT is scheduled for 8/5.     Please call patient: [phone]

## 2021-07-28 NOTE — TELEPHONE ENCOUNTER
Artie Woodard from scheduling called to request labwork for a CT she is scheduling. She needs Bun, GFR and creatinine.      Call her with any questions at [phone]

## 2021-07-28 NOTE — TELEPHONE ENCOUNTER
Medication:   Requested Prescriptions     Pending Prescriptions Disp Refills    gemfibrozil (LOPID) 600 MG tablet [Pharmacy Med Name: GEMFIBROZIL TABS 600MG] 180 tablet 3     Sig: TAKE 1 TABLET TWICE A DAY FOR TRIGLYCERIDES    metFORMIN (GLUCOPHAGE) 850 MG tablet [Pharmacy Med Name: METFORMIN HCL TABS 850MG] 180 tablet 3     Sig: TAKE 1 TABLET TWICE A DAY WITH MEALS    atorvastatin (LIPITOR) 20 MG tablet [Pharmacy Med Name: ATORVASTATIN TABS 20MG] 90 tablet 3     Sig: TAKE 1 TABLET DAILY       Last Filled:      Patient Phone Number: [phone] (home)     Last appt: 2/12/2021   Next appt: Visit date not found    Last Labs DM:   Lab Results   Component Value Date    LABA1C 7.9 06/23/2021     Last Lipid:   Lab Results   Component Value Date    CHOL 151 02/12/2021    TRIG 63 02/12/2021    HDL 69 02/12/2021    LDLCALC 69 02/12/2021     Last PSA: No results found for: PSA  Last Thyroid:   Lab Results   Component Value Date    TSH 1.160 06/23/2021    TSH 2.85 01/22/2020    FT3 2.3 01/22/2020    T4FREE 1.1 01/22/2020    R1NHQSX 6.7 05/03/2016

## 2021-08-05 ENCOUNTER — HOSPITAL ENCOUNTER (OUTPATIENT)
Dept: CT IMAGING | Age: 61
Discharge: HOME | End: 2021-08-05
Payer: COMMERCIAL

## 2021-08-05 ENCOUNTER — HOSPITAL ENCOUNTER (OUTPATIENT)
Age: 61
Discharge: HOME | End: 2021-08-05
Payer: COMMERCIAL

## 2021-08-05 ENCOUNTER — TELEPHONE (OUTPATIENT)
Dept: INTERNAL MEDICINE CLINIC | Age: 61
End: 2021-08-05

## 2021-08-05 DIAGNOSIS — R10.9 RIGHT SIDED ABDOMINAL PAIN: ICD-10-CM

## 2021-08-05 LAB
A/G RATIO: 1.5 (ref 1.1–2.2)
ALBUMIN SERPL-MCNC: 4.2 G/DL (ref 3.4–5)
ALP BLD-CCNC: 62 U/L (ref 40–129)
ALT SERPL-CCNC: 14 U/L (ref 10–40)
ANION GAP SERPL CALCULATED.3IONS-SCNC: 8 MMOL/L (ref 3–16)
AST SERPL-CCNC: 16 U/L (ref 15–37)
BILIRUB SERPL-MCNC: 0.4 MG/DL (ref 0–1)
BUN BLDV-MCNC: 16 MG/DL (ref 7–20)
CALCIUM SERPL-MCNC: 9.6 MG/DL (ref 8.3–10.6)
CHLORIDE BLD-SCNC: 105 MMOL/L (ref 99–110)
CO2: 27 MMOL/L (ref 21–32)
CREAT SERPL-MCNC: <0.5 MG/DL (ref 0.6–1.2)
GFR AFRICAN AMERICAN: >60
GFR NON-AFRICAN AMERICAN: >60
GLOBULIN: 2.8 G/DL
GLUCOSE BLD-MCNC: 119 MG/DL (ref 70–99)
POTASSIUM SERPL-SCNC: 4.1 MMOL/L (ref 3.5–5.1)
SODIUM BLD-SCNC: 140 MMOL/L (ref 136–145)
TOTAL PROTEIN: 7 G/DL (ref 6.4–8.2)

## 2021-08-05 PROCEDURE — 6360000004 HC RX CONTRAST MEDICATION: Performed by: SURGERY

## 2021-08-05 PROCEDURE — 74177 CT ABD & PELVIS W/CONTRAST: CPT

## 2021-08-05 PROCEDURE — 80053 COMPREHEN METABOLIC PANEL: CPT

## 2021-08-05 PROCEDURE — 36415 COLL VENOUS BLD VENIPUNCTURE: CPT

## 2021-08-05 RX ADMIN — IOHEXOL 50 ML: 240 INJECTION, SOLUTION INTRATHECAL; INTRAVASCULAR; INTRAVENOUS; ORAL at 08:38

## 2021-08-05 RX ADMIN — IOPAMIDOL 75 ML: 755 INJECTION, SOLUTION INTRAVENOUS at 08:38

## 2021-08-05 NOTE — TELEPHONE ENCOUNTER
Patient called in because she has a tender spot on her stomach and she went to get a CT scan and after the scan the pain got worse. She was wondering if we we could review her results and give her a call with a solution. She is willing to be seen if necessary.           Please call to advise

## 2021-08-05 NOTE — TELEPHONE ENCOUNTER
[id number] Rody Wilburn - since the CT was normal would keep an eye on it and see if it resolves or follow up with me if it's not resolving

## 2021-08-09 ENCOUNTER — TELEPHONE (OUTPATIENT)
Dept: SURGERY | Age: 61
End: 2021-08-09

## 2021-08-09 NOTE — TELEPHONE ENCOUNTER
Pt informed of results. ----- Message from Bessy Rodriguez MD sent at 8/8/2021  9:30 AM EDT -----  Nothing obvious on imaging as a cause of her abdominal pain, but she does have an umbilical hernia in the midline. We can fix this at the time of her surgery to see if it helps with her abdominal pain. Thanks! NK

## 2021-08-11 ENCOUNTER — TELEPHONE (OUTPATIENT)
Dept: ENDOCRINOLOGY | Age: 61
End: 2021-08-11

## 2021-08-11 DIAGNOSIS — E78.49 OTHER HYPERLIPIDEMIA: ICD-10-CM

## 2021-08-11 DIAGNOSIS — E11.65 UNCONTROLLED TYPE 2 DIABETES MELLITUS WITH HYPERGLYCEMIA (HCC): ICD-10-CM

## 2021-08-11 RX ORDER — INSULIN GLARGINE 100 [IU]/ML
INJECTION, SOLUTION SUBCUTANEOUS
Qty: 15 PEN | Refills: 0 | Status: SHIPPED | OUTPATIENT
Start: 2021-08-11 | End: 2021-10-26

## 2021-08-11 RX ORDER — GEMFIBROZIL 600 MG/1
TABLET, FILM COATED ORAL
Qty: 180 TABLET | Refills: 1 | Status: SHIPPED | OUTPATIENT
Start: 2021-08-11

## 2021-08-11 RX ORDER — ATORVASTATIN CALCIUM 20 MG/1
TABLET, FILM COATED ORAL
Qty: 90 TABLET | Refills: 1 | Status: SHIPPED | OUTPATIENT
Start: 2021-08-11

## 2021-08-11 NOTE — TELEPHONE ENCOUNTER
Patient needs a refill of her insulin glargine (LANTUS SOLOSTAR) 100 UNIT/ML injection pen sent to:        [facility] [address] [place] [address]   Phone:  [phone]  Fax:  [phone]       Patient also needs her atorvastatin (LIPITOR) 20 MG tablet gemfibrozil (LOPID) 600 MG tablet and metFORMIN (GLUCOPHAGE) 850 MG tablet sent:      [address] [phone] - [phone]   [place] [address]   Phone:  [phone]  Fax:  [phone]

## 2021-09-22 ENCOUNTER — TELEPHONE (OUTPATIENT)
Dept: INTERNAL MEDICINE CLINIC | Age: 61
End: 2021-09-22

## 2021-09-22 NOTE — TELEPHONE ENCOUNTER
Pt Called to go over Ct scan. She got scan at [facility], and there is a letter in her records that says they sent the scan over.  If received can you call Pt to discuss

## 2021-09-23 ENCOUNTER — TELEPHONE (OUTPATIENT)
Dept: INTERNAL MEDICINE CLINIC | Age: 61
End: 2021-09-23

## 2021-09-24 DIAGNOSIS — R91.8 RIGHT LOWER LOBE LUNG MASS: Primary | ICD-10-CM

## 2021-09-24 NOTE — TELEPHONE ENCOUNTER
Spoke with patient, CT chest w/contrast ordered to further evaluate finding on CT chest from [facility]

## 2021-10-04 ENCOUNTER — TELEPHONE (OUTPATIENT)
Dept: INTERNAL MEDICINE CLINIC | Age: 61
End: 2021-10-04

## 2021-10-04 DIAGNOSIS — I10 PRIMARY HYPERTENSION: Primary | ICD-10-CM

## 2021-10-04 NOTE — TELEPHONE ENCOUNTER
Pt just had these done on Sept 2nd from [address] [place] [address].  Results are in care everywhere

## 2021-10-05 ENCOUNTER — HOSPITAL ENCOUNTER (OUTPATIENT)
Dept: CT IMAGING | Age: 61
Discharge: HOME | End: 2021-10-05
Payer: COMMERCIAL

## 2021-10-05 DIAGNOSIS — R91.8 RIGHT LOWER LOBE LUNG MASS: ICD-10-CM

## 2021-10-05 PROCEDURE — 6360000004 HC RX CONTRAST MEDICATION: Performed by: INTERNAL MEDICINE

## 2021-10-05 PROCEDURE — 71260 CT THORAX DX C+: CPT

## 2021-10-05 RX ADMIN — IOPAMIDOL 75 ML: 755 INJECTION, SOLUTION INTRAVENOUS at 15:06

## 2021-10-07 DIAGNOSIS — R91.8 RIGHT LOWER LOBE LUNG MASS: Primary | ICD-10-CM

## 2021-10-11 ENCOUNTER — VIRTUAL VISIT (OUTPATIENT)
Dept: INTERNAL MEDICINE CLINIC | Age: 61
End: 2021-10-11
Payer: COMMERCIAL

## 2021-10-11 DIAGNOSIS — R91.8 RIGHT LOWER LOBE LUNG MASS: ICD-10-CM

## 2021-10-11 DIAGNOSIS — F43.23 SITUATIONAL MIXED ANXIETY AND DEPRESSIVE DISORDER: Primary | ICD-10-CM

## 2021-10-11 PROCEDURE — 99213 OFFICE O/P EST LOW 20 MIN: CPT | Performed by: INTERNAL MEDICINE

## 2021-10-11 RX ORDER — ALPRAZOLAM 1 MG/1
1 TABLET ORAL NIGHTLY PRN
Qty: 90 TABLET | Refills: 0 | Status: SHIPPED | OUTPATIENT
Start: 2021-10-11 | End: 2022-01-06

## 2021-10-11 NOTE — Clinical Note
Van Dorado referred Annika Eaton to you for a RLL lesion, she recently had a follow up CT that showed no improvement. She is scheduled to see you in December, would it be helpful to have any other imaging prior to that appointment?   Thanks  Saba Belcher

## 2021-10-11 NOTE — PROGRESS NOTES
Estuardo Miles (:  1960) is a 62 y.o. female,Established patient, here for evaluation of the following chief complaint(s): Medication Check (med refill )         ASSESSMENT/PLAN:  1. Situational mixed anxiety and depressive disorder  - stable, has had a slight increase in the last few days but overall doing well  - OARRS reviewed, no concerns  -     ALPRAZolam (XANAX) 1 MG tablet; Take 1 tablet by mouth nightly as needed for Sleep for up to 90 days. , Disp-90 tablet, R-0Normal  2. Right lower lobe lung mass   - noted incidentally last year, this has not progressed but also has not improved. Referred to pulmonologist for further evaluation    Return in about 3 months (around 2022) for anxiety. SUBJECTIVE/OBJECTIVE:  HPI    Anxiety is overall stable. She has had some increase in anxiety since the CT scan results, a little more insomnia, but that has improved. Distraction has helped. She did schedule an appointment with the pulmonologist.    Review of Systems    Patient-Reported Vitals 10/11/2021   Patient-Reported Weight 180.6lb   Patient-Reported Height 5'4   Patient-Reported Systolic -   Patient-Reported Diastolic -   Patient-Reported Temperature -        Physical Exam  Vitals reviewed. Constitutional:       General: She is not in acute distress. Appearance: Normal appearance. HENT:      Head: Normocephalic and atraumatic. Pulmonary:      Effort: Pulmonary effort is normal. No respiratory distress. Neurological:      Mental Status: She is alert. Psychiatric:         Mood and Affect: Mood normal.         Behavior: Behavior normal.         Thought Content: Thought content normal.         Judgment: Judgment normal.                   Estuardo Miles, was evaluated through a synchronous (real-time) audio-video encounter. The patient (or guardian if applicable) is aware that this is a billable service. Verbal consent to proceed has been obtained within the past 12 months.  The visit was conducted pursuant to the emergency declaration under the [facility], [facility] authority and the [employer] Act. Patient identification was verified, and a caregiver was present when appropriate. The patient was located in a state where the provider was credentialed to provide care. An electronic signature was used to authenticate this note.     --Radha Mckeon MD

## 2021-10-20 ENCOUNTER — TELEPHONE (OUTPATIENT)
Dept: INTERNAL MEDICINE CLINIC | Age: 61
End: 2021-10-20

## 2021-10-20 DIAGNOSIS — R91.8 RIGHT LOWER LOBE LUNG MASS: Primary | ICD-10-CM

## 2021-10-20 NOTE — TELEPHONE ENCOUNTER
Patient called in wanting to know if Dr. Dar Craft was able to get the patient in to see Dr. Wagner Born before December and also would like orders for a PT scan. Patient discussed this with Dr. Dar Craft at her [place] [address] on 10/11.            Pls call and advise

## 2021-10-26 RX ORDER — INSULIN GLARGINE 100 [IU]/ML
INJECTION, SOLUTION SUBCUTANEOUS
Qty: 5 PEN | Refills: 3 | Status: SHIPPED
Start: 2021-10-26 | End: 2022-04-04 | Stop reason: SDUPTHER

## 2021-11-12 ENCOUNTER — TELEPHONE (OUTPATIENT)
Dept: INTERNAL MEDICINE CLINIC | Age: 61
End: 2021-11-12

## 2021-11-15 ENCOUNTER — TELEPHONE (OUTPATIENT)
Dept: INTERNAL MEDICINE CLINIC | Age: 61
End: 2021-11-15

## 2021-11-15 NOTE — TELEPHONE ENCOUNTER
[address] [place] scheduling called into the office to request a call back for instruction for patients upcoming PET scan. Pt insurance denied the PA and wanted to know if a peer to peer would be completed. Please advise and call.

## 2021-11-15 NOTE — TELEPHONE ENCOUNTER
Hold off on the scan for right now - once I get the official denial I will take a look at it. I think it will be more helpful to see Dr. Irina Soto and get his opinion.

## 2021-11-22 RX ORDER — RAMIPRIL 10 MG/1
CAPSULE ORAL
Qty: 90 CAPSULE | Refills: 3 | Status: SHIPPED | OUTPATIENT
Start: 2021-11-22

## 2021-12-21 ENCOUNTER — OFFICE VISIT (OUTPATIENT)
Dept: PULMONOLOGY | Age: 61
End: 2021-12-21
Payer: COMMERCIAL

## 2021-12-21 VITALS
HEIGHT: 65 IN | HEART RATE: 87 BPM | SYSTOLIC BLOOD PRESSURE: 138 MMHG | TEMPERATURE: 96.8 F | WEIGHT: 190 LBS | DIASTOLIC BLOOD PRESSURE: 88 MMHG | RESPIRATION RATE: 16 BRPM | BODY MASS INDEX: 31.65 KG/M2 | OXYGEN SATURATION: 97 %

## 2021-12-21 DIAGNOSIS — R91.1 RIGHT LOWER LOBE PULMONARY NODULE: Primary | ICD-10-CM

## 2021-12-21 PROCEDURE — 99203 OFFICE O/P NEW LOW 30 MIN: CPT | Performed by: INTERNAL MEDICINE

## 2021-12-21 NOTE — Clinical Note
Ivon Burkett was working on getting this patient scheduled for a navigational bronchoscopy. Can someone help her get setup for the navigational CT? It needs to be done at least a few days prior to the bronchoscopy.

## 2021-12-21 NOTE — PROGRESS NOTES
Medication Sig Dispense Refill    Omega-3 Fatty Acids (FISH OIL PO) Take by mouth      Cyanocobalamin (B-12 PO) Take by mouth      ramipril (ALTACE) 10 MG capsule TAKE 1 CAPSULE DAILY FOR BLOOD PRESSURE 90 capsule 3    sertraline (ZOLOFT) 50 MG tablet TAKE 1 TABLET DAILY FOR MOOD 90 tablet 3    insulin glargine (BASAGLAR KWIKPEN) 100 UNIT/ML injection pen INJECT 50 UNITS UNDER THE SKIN ONCE NIGHTLY 5 pen 3    ALPRAZolam (XANAX) 1 MG tablet Take 1 tablet by mouth nightly as needed for Sleep for up to 90 days. 90 tablet 0    atorvastatin (LIPITOR) 20 MG tablet TAKE 1 TABLET DAILY 90 tablet 1    metFORMIN (GLUCOPHAGE) 850 MG tablet TAKE 1 TABLET TWICE A DAY WITH MEALS 180 tablet 1    gemfibrozil (LOPID) 600 MG tablet TAKE 1 TABLET TWICE A DAY FOR TRIGLYCERIDES 180 tablet 1    insulin glargine (BASAGLAR KWIKPEN) 100 UNIT/ML injection pen Inject 15 Units into the skin nightly 5 pen 3    Insulin Pen Needle (B-D UF III MINI PEN NEEDLES) 31G X 5 MM MISC Inject insulin 4 times a day 120 each 6    Glucosamine-Chondroitin (GLUCOSAMINE CHONDR COMPLEX PO) Take by mouth daily       No current facility-administered medications on file prior to visit. Allergies:   Allergies   Allergen Reactions    Morphine Other (See Comments)     Unsure of reaction; Pt says she got mean        REVIEW OF SYSTEMS:    CONSTITUTIONAL: Negative for fevers and chills  HEENT: Negative for oropharyngeal exudate, post nasal drip, sinus pain / pressure, nasal congestion, ear pain  RESPIRATORY:  See HPI  CARDIOVASCULAR: Negative for chest pain, palpitations, edema  GASTROINTESTINAL: Negative for nausea, vomiting, diarrhea, constipation and abdominal pain  HEMATOLOGICAL: Negative for adenopathy  SKIN: Negative for clubbing, cyanosis, skin lesions  EXTREMITIES: Negative for weakness, decreased ROM  NEUROLOGICAL: Negative for unilateral weakness, speech or gait abnormalities  PSYCH: Negative for anxiety, depression    Objective:   PHYSICAL Pneumococcal Polysaccharide ([id number]) 10/31/2018    Td (Adult), 5 Lf Tetanus Toxoid, Pf (Tenivac, Decavac) 10/01/2011    Td, (Adult) Not Adsorbed 02/19/2010    Td, unspecified formulation 10/01/2011    Tdap (Boostrix, Adacel) 02/19/2010, 10/06/2016    Zoster Recombinant (Shingrix) 09/24/2019, 01/09/2020       Assessment: This is a 62 y.o. female with 2 cm pulmonary nodule adenopathy    Plan:   -Lesion has been present for 15 months without evidence of growth and with persistent adenopathy. Malignancy is possible but seems slightly less likely, sarcoidosis is a possibility as his other forms of granulomatous disease. Unclear if the adenopathy is related to the nodule but there is a possibility. The nodule is the most specifically concerning issue so pursuit of it and biopsy seems the most appropriate next step. Alternatively an EBUS of the subcarinal node could be helpful but may not answer the question as to what this right lower lobe lesion has. As it is slow-growing I think it would be reasonable to pursue navigational bronchoscopy which should be available at [facility] via a [facility] bronchoscopic room towards the end of February/beginning of March. I would like to follow patient up in March so I can get the appropriate imaging studies in preparation for that procedure. Patient was informed that she might be the first patient in our system to get this procedure done and was okay with that after I explained why it would be difficult to biopsy that lesion by any other means. If biopsy were not an option then serial follow-up would be the other choice. Percutaneous needle biopsy of the right lower lobe lesion is not possible as the lesion is fairly central and adjacent to the spine.    - Tobacco use: The patient is a never smoker    - RTC 2 months w/ MD. Call or RTC sooner if symptoms persist or worsen acutely.

## 2022-01-05 ENCOUNTER — TELEPHONE (OUTPATIENT)
Dept: INTERNAL MEDICINE CLINIC | Age: 62
End: 2022-01-05

## 2022-01-05 NOTE — TELEPHONE ENCOUNTER
----- Message from Elfredia Silk sent at 1/5/2022  8:30 AM EST -----  Subject: Appointment Request    Reason for Call: Routine Existing Condition Follow Up    QUESTIONS  Type of Appointment? Established Patient  Reason for appointment request? Available appointments did not meet   patient need  Additional Information for Provider? Pt called needing to schedule an appt   for a med refill for her Zanax. No available times to schedule Pt until   February. Pt says she will be out her medication by next week . Pt also   wanted the Provider to know she tested Positive for Covid on 01/04. Please   call Pt to advise  ---------------------------------------------------------------------------  --------------  CALL BACK INFO  What is the best way for the office to contact you? OK to leave message on   voicemail  Preferred Call Back Phone Number? [phone]  ---------------------------------------------------------------------------  --------------  SCRIPT ANSWERS  Relationship to Patient? Self  Is this follow up request related to routine Diabetes Management? No  Have you been diagnosed with, awaiting test results for, or told that you   are suspected of having COVID-19 (Coronavirus)? (If patient has tested   negative or was tested as a requirement for work, school, or travel and   not based on symptoms, answer no)? Yes  Did your symptoms begin within the past 14 days or was your positive test   result within the past 14 days?  Yes

## 2022-01-06 ENCOUNTER — VIRTUAL VISIT (OUTPATIENT)
Dept: INTERNAL MEDICINE CLINIC | Age: 62
End: 2022-01-06
Payer: COMMERCIAL

## 2022-01-06 DIAGNOSIS — J11.1 INFLUENZA: ICD-10-CM

## 2022-01-06 DIAGNOSIS — U07.1 COVID-19: ICD-10-CM

## 2022-01-06 DIAGNOSIS — F43.23 SITUATIONAL MIXED ANXIETY AND DEPRESSIVE DISORDER: Primary | ICD-10-CM

## 2022-01-06 DIAGNOSIS — R91.1 RIGHT LOWER LOBE PULMONARY NODULE: ICD-10-CM

## 2022-01-06 PROCEDURE — 99214 OFFICE O/P EST MOD 30 MIN: CPT | Performed by: INTERNAL MEDICINE

## 2022-01-06 PROCEDURE — G8427 DOCREV CUR MEDS BY ELIG CLIN: HCPCS | Performed by: INTERNAL MEDICINE

## 2022-01-06 PROCEDURE — 3017F COLORECTAL CA SCREEN DOC REV: CPT | Performed by: INTERNAL MEDICINE

## 2022-01-06 RX ORDER — ALPRAZOLAM 1 MG/1
1 TABLET ORAL NIGHTLY PRN
Qty: 90 TABLET | Refills: 0 | Status: SHIPPED | OUTPATIENT
Start: 2022-01-06 | End: 2022-04-04 | Stop reason: SDUPTHER

## 2022-01-06 NOTE — PROGRESS NOTES
Zay Ralph (:  1960) is a 62 y.o. female,Established patient, here for evaluation of the following chief complaint(s): Medication Check         ASSESSMENT/PLAN:  1. Situational mixed anxiety and depressive disorder  - stable  - OARRS reviewed, no concerns  -     ALPRAZolam (XANAX) 1 MG tablet; Take 1 tablet by mouth nightly as needed for Sleep for up to 90 days. , Disp-90 tablet, R-0Normal  2. Right lower lobe pulmonary nodule   - will have follow up with Dr. Betty Santamaria, may proceed with advanced diagnostic testing once this is available  3. COVID-19   - unfortunately had a breakthrough case, supportive care  4. Influenza   - completing course of tamiflu    Return in about 3 months (around 2022) for insomnia. SUBJECTIVE/OBJECTIVE:  HPI    Insomnia, anxiety -she continues to need the alprazolam most nights to fall asleep. She denies any side effects, no excessive sleepiness. Right lower lobe pulmonary nodule -she saw Dr. Betty Santamaria, due to the location of the nodule it is difficult to biopsy but there will be new equipment available early this year which may allow a biopsy. She is going to follow-up with him in March. She started to have cold symptoms a couple of days ago and was tested, found to have both COVID and influenza. She feels poorly, no shortness of breath, has runny nose. Using over-the-counter medication if needed. She was given Tamiflu for the influenza. Review of Systems    Patient-Reported Vitals 2022   Patient-Reported Weight 183 lbs   Patient-Reported Height 5 4   Patient-Reported Systolic 200   Patient-Reported Diastolic 88   Patient-Reported Pulse -   Patient-Reported Temperature -        Physical Exam  Vitals reviewed. Constitutional:       General: She is not in acute distress. Appearance: Normal appearance. She is ill-appearing. She is not toxic-appearing. HENT:      Head: Normocephalic and atraumatic.    Pulmonary:      Effort: Pulmonary effort is normal. No respiratory distress. Neurological:      Mental Status: She is alert. Psychiatric:         Mood and Affect: Mood is anxious. Behavior: Behavior normal.         Thought Content: Thought content normal.         Judgment: Judgment normal.                   Zay Villalba, was evaluated through a synchronous (real-time) audio-video encounter. The patient (or guardian if applicable) is aware that this is a billable service. Verbal consent to proceed has been obtained within the past 12 months. The visit was conducted pursuant to the emergency declaration under the [address] [place] [address] authority and the [employer] [facility] General Act. Patient identification was verified, and a caregiver was present when appropriate. The patient was located in a state where the provider was credentialed to provide care. An electronic signature was used to authenticate this note.     --Susan Mar MD

## 2022-01-15 DIAGNOSIS — E78.49 OTHER HYPERLIPIDEMIA: ICD-10-CM

## 2022-01-15 DIAGNOSIS — E11.65 UNCONTROLLED TYPE 2 DIABETES MELLITUS WITH HYPERGLYCEMIA (HCC): ICD-10-CM

## 2022-01-17 RX ORDER — GEMFIBROZIL 600 MG/1
TABLET, FILM COATED ORAL
Qty: 180 TABLET | Refills: 3 | OUTPATIENT
Start: 2022-01-17

## 2022-01-17 RX ORDER — ATORVASTATIN CALCIUM 20 MG/1
TABLET, FILM COATED ORAL
Qty: 90 TABLET | Refills: 3 | OUTPATIENT
Start: 2022-01-17

## 2022-01-17 NOTE — TELEPHONE ENCOUNTER
Medication:   Requested Prescriptions     Pending Prescriptions Disp Refills    metFORMIN (GLUCOPHAGE) 850 MG tablet [Pharmacy Med Name: METFORMIN HCL TABS 850MG] 180 tablet 3     Sig: TAKE 1 TABLET TWICE A DAY WITH MEALS    gemfibrozil (LOPID) 600 MG tablet [Pharmacy Med Name: GEMFIBROZIL TABS 600MG] 180 tablet 3     Sig: TAKE 1 TABLET TWICE A DAY FOR TRIGLYCERIDES    atorvastatin (LIPITOR) 20 MG tablet [Pharmacy Med Name: ATORVASTATIN TABS 20MG] 90 tablet 3     Sig: TAKE 1 TABLET DAILY       Last Filled:      Patient Phone Number: [phone] (home)     Last appt: 2/12/2021   Next appt: Visit date not found    Last Labs DM:   Lab Results   Component Value Date    LABA1C 7.9 06/23/2021     Last Lipid:   Lab Results   Component Value Date    CHOL 151 02/12/2021    TRIG 63 02/12/2021    HDL 69 02/12/2021    LDLCALC 69 02/12/2021     Last PSA: No results found for: PSA  Last Thyroid:   Lab Results   Component Value Date    TSH 1.160 06/23/2021    TSH 2.85 01/22/2020    FT3 2.3 01/22/2020    T4FREE 1.1 01/22/2020    [id number] 6.7 05/03/2016

## 2022-01-25 ENCOUNTER — TELEPHONE (OUTPATIENT)
Dept: PULMONOLOGY | Age: 62
End: 2022-01-25

## 2022-01-26 ENCOUNTER — TELEPHONE (OUTPATIENT)
Dept: PULMONOLOGY | Age: 62
End: 2022-01-26

## 2022-01-26 NOTE — PROGRESS NOTES
Called patient and gave her the number to set up CT scan and gave her the info about her Robotic bronch. Pt verbalized understanding.

## 2022-01-26 NOTE — TELEPHONE ENCOUNTER
Called and went over procedure time and date. And patient will get CT scan done prior to appt on 2-15-22  Patient verbalized understanding. Patient has been set up for a Bronchoscopy/ EBUS/Thoracentesis     Where: [facility]   Day: March 4 th 2022  Arrive: @ 10am   Procedure Time:12 noon       1. Nothing by mouth midnight before procedure   2. Arrive 1 hour before Bronch   3. Or if EBUS arrive 2 hours before   4. Arrive 1 Hour before if Thoracentesis   5. Sign in with Admitting / Registration at main Entrance   6. Please have transportation arrangements from hospital after you procedure.

## 2022-02-01 ENCOUNTER — HOSPITAL ENCOUNTER (OUTPATIENT)
Dept: CT IMAGING | Age: 62
Discharge: HOME | End: 2022-02-01
Payer: COMMERCIAL

## 2022-02-01 DIAGNOSIS — R91.1 RIGHT LOWER LOBE PULMONARY NODULE: ICD-10-CM

## 2022-02-01 PROCEDURE — 71250 CT THORAX DX C-: CPT

## 2022-02-15 ENCOUNTER — TELEPHONE (OUTPATIENT)
Dept: PULMONOLOGY | Age: 62
End: 2022-02-15

## 2022-02-15 ENCOUNTER — OFFICE VISIT (OUTPATIENT)
Dept: PULMONOLOGY | Age: 62
End: 2022-02-15
Payer: COMMERCIAL

## 2022-02-15 VITALS
RESPIRATION RATE: 16 BRPM | DIASTOLIC BLOOD PRESSURE: 97 MMHG | TEMPERATURE: 96.7 F | WEIGHT: 195 LBS | HEART RATE: 95 BPM | SYSTOLIC BLOOD PRESSURE: 157 MMHG | OXYGEN SATURATION: 96 % | HEIGHT: 65 IN | BODY MASS INDEX: 32.49 KG/M2

## 2022-02-15 DIAGNOSIS — R91.1 RIGHT LOWER LOBE PULMONARY NODULE: Primary | ICD-10-CM

## 2022-02-15 PROCEDURE — 3017F COLORECTAL CA SCREEN DOC REV: CPT | Performed by: INTERNAL MEDICINE

## 2022-02-15 PROCEDURE — G8417 CALC BMI ABV UP PARAM F/U: HCPCS | Performed by: INTERNAL MEDICINE

## 2022-02-15 PROCEDURE — 1036F TOBACCO NON-USER: CPT | Performed by: INTERNAL MEDICINE

## 2022-02-15 PROCEDURE — G8484 FLU IMMUNIZE NO ADMIN: HCPCS | Performed by: INTERNAL MEDICINE

## 2022-02-15 PROCEDURE — 99212 OFFICE O/P EST SF 10 MIN: CPT | Performed by: INTERNAL MEDICINE

## 2022-02-15 PROCEDURE — G8427 DOCREV CUR MEDS BY ELIG CLIN: HCPCS | Performed by: INTERNAL MEDICINE

## 2022-02-15 NOTE — PROGRESS NOTES
[facility] Pulmonary and Critical Care    Outpatient follow-up note    Subjective:   Referring Physician: Jeannine Strauss MD  [address] / HPI:     The patient is 62 y.o. female who presents today for a follow-up visit for abnormal CT chest.  Ms. Tori Del Rio was asked to come in today so that we could review her most recent CT chest we discussed plans for bronchoscopy in 2 weeks. She is not having any respiratory symptoms but is nervous about the procedure. Initial visit: Patient is a never smoker who had a lesion in her lungs come to medical attention after a series of falls. Patient was vacationing with friends in May 2020 when she slipped and fell and fractured her vertebrae. She had to have spine stabilization surgery and while she was rehabbing from that she fell again on her backside and repeat imaging was performed including of the chest.  At that time her chest CT showed multiple sub-5 cm pulmonary nodules but 1 nodule in the superior segment of her right lower lobe that was roughly 2 cm in size. It was thought to potentially be infectious so she was treated with antibiotics and a repeat scan was done in December 2020. There was no significant change. A further follow-up scan was performed in October of this year and the lesion persisted. A PET scan was ordered but was refused by her insurance company and she was referred to me for further evaluation. Patient has no complaints of shortness of breath or cough but is concerned as to what this thing is and why it is there. Past Medical History:    Past Medical History:   Diagnosis Date    Allergic rhinitis     Asthma     H/O- stress related    Diabetes mellitus, type 1     Fibroid     History of blood transfusion 2001?     Hyperlipidemia     Menopause ovarian failure     Other acute reactions to stress     Pancreatitis 2000    Rectal abscess     II       Social History:    Social History     Tobacco Use   Smoking Status Never Smoker Smokeless Tobacco Never Used       Family History:  Family History   Problem Relation Age of Onset    Heart Disease Mother     Diabetes Father      Current Medications:  Current Outpatient Medications on File Prior to Visit   Medication Sig Dispense Refill    ALPRAZolam (XANAX) 1 MG tablet Take 1 tablet by mouth nightly as needed for Sleep for up to 90 days. 90 tablet 0    Omega-3 Fatty Acids (FISH OIL PO) Take by mouth      Cyanocobalamin (B-12 PO) Take by mouth      ramipril (ALTACE) 10 MG capsule TAKE 1 CAPSULE DAILY FOR BLOOD PRESSURE 90 capsule 3    sertraline (ZOLOFT) 50 MG tablet TAKE 1 TABLET DAILY FOR MOOD 90 tablet 3    insulin glargine (BASAGLAR KWIKPEN) 100 UNIT/ML injection pen INJECT 50 UNITS UNDER THE SKIN ONCE NIGHTLY 5 pen 3    atorvastatin (LIPITOR) 20 MG tablet TAKE 1 TABLET DAILY 90 tablet 1    metFORMIN (GLUCOPHAGE) 850 MG tablet TAKE 1 TABLET TWICE A DAY WITH MEALS 180 tablet 1    gemfibrozil (LOPID) 600 MG tablet TAKE 1 TABLET TWICE A DAY FOR TRIGLYCERIDES 180 tablet 1    insulin glargine (BASAGLAR KWIKPEN) 100 UNIT/ML injection pen Inject 15 Units into the skin nightly 5 pen 3    Insulin Pen Needle (B-D UF III MINI PEN NEEDLES) 31G X 5 MM MISC Inject insulin 4 times a day 120 each 6    Glucosamine-Chondroitin (GLUCOSAMINE CHONDR COMPLEX PO) Take by mouth daily       No current facility-administered medications on file prior to visit. Allergies:   Allergies   Allergen Reactions    Morphine Other (See Comments)     Unsure of reaction; Pt says she got mean        REVIEW OF SYSTEMS:    CONSTITUTIONAL: Negative for fevers and chills  HEENT: Negative for oropharyngeal exudate, post nasal drip, sinus pain / pressure, nasal congestion, ear pain  RESPIRATORY:  See HPI  CARDIOVASCULAR: Negative for chest pain, palpitations, edema  GASTROINTESTINAL: Negative for nausea, vomiting, diarrhea, constipation and abdominal pain  HEMATOLOGICAL: Negative for adenopathy  SKIN: Negative for clubbing, cyanosis, skin lesions  EXTREMITIES: Negative for weakness, decreased ROM  NEUROLOGICAL: Negative for unilateral weakness, speech or gait abnormalities  PSYCH: Negative for anxiety, depression    Objective:   PHYSICAL EXAM:        VITALS:  BP (!) 157/97 (Site: Left Upper Arm, Position: Sitting, Cuff Size: Medium Adult)   Pulse 95   Temp 96.7 °F (35.9 °C) (Infrared)   Resp 16   Ht 5' 4.5\" (1.638 m)   Wt 195 lb (88.5 kg)   SpO2 96%   BMI 32.95 kg/m²     CONSTITUTIONAL:  Awake, alert, cooperative, no apparent distress, and appears stated age  HEENT: No oropharyngeal exudate, PERRL, no cervical adenopathy, no tracheal deviation, thyroid size normal  LUNGS:  No increased work of breathing and clear to auscultation, no crackles or wheezing   CARDIOVASCULAR:  normal S1 and S2 and no JVD  ABDOMEN:  Normal bowel sounds, non-distended and non-tender to palpation  EXT: No edema, no calf tenderness. Pulses are present bilaterally. NEUROLOGIC:  Mental Status Exam:  Level of Alertness:   awake  Orientation:   person, place, time. SKIN:  normal skin color, texture, turgor, no redness, warmth, or swelling     DATA:      Radiology Review:  Pertinent images / reports were reviewed as a part of this visit. CT chest reveals the following:    10/2021:  Impression   No change in dominant subpleural nodular area of consolidation in the right   lower lobe, with dominant subcarinal lymph node.  Given its persistence and   lack of resolution, consider PET-CT.        Last PFTs: None on file    Immunizations:   Immunization History   Administered Date(s) Administered    COVID-19, Pfizer Purple top, DILUTE for use, 12+ yrs, 30mcg/0.3mL dose 03/12/2021, 04/02/2021    Influenza Virus Vaccine 10/07/2014, 10/06/2016, 10/06/2016, 10/31/2018, 10/31/2018    Influenza Whole 10/07/2014, 10/08/2015    Influenza, Intradermal, Preservative free 10/04/2017    Influenza, Quadv, IM, (6 mo and older Fluzone, Flulaval, Fluarix and 3 yrs and older Afluria) 10/06/2016, 10/31/2018    Influenza, Simms Cozier, IM, PF (6 mo and older Fluzone, Flulaval, Fluarix, and 3 yrs and older Afluria) 09/24/2019    Influenza, Simms Cozier, Recombinant, IM PF (Flublok 18 yrs and older) 09/24/2019, 10/18/2020    Pneumococcal Polysaccharide ([id number]) 10/31/2018    Td (Adult), 5 Lf Tetanus Toxoid, Pf (Tenivac, Decavac) 10/01/2011    Td, (Adult) Not Adsorbed 02/19/2010    Td, unspecified formulation 10/01/2011    Tdap (Boostrix, Adacel) 02/19/2010, 10/06/2016    Zoster Recombinant (Shingrix) 09/24/2019, 01/09/2020       Assessment: This is a 62 y.o. female with 2 cm right lower lobe pulmonary nodule     Plan:   -Lesion has been present for 15 months without evidence of growth and with persistent adenopathy. Malignancy is possible but seems slightly less likely, sarcoidosis is a possibility as is other forms of granulomatous disease. There has been some change in density since it was originally discovered in 2020 when she had a spine CT    Unclear if the adenopathy is related to the nodule but there is a possibility. The nodule is the most specifically concerning issue so pursuit of it and biopsy seems the most appropriate next step. Alternatively an EBUS of the subcarinal node could be helpful but may not answer the question as to what this right lower lobe lesion is. The lesion is in a medial subsegment of the right lower lobe superior subsegment which is a notoriously difficult place to perform biopsies. It also anterior to the spine which makes it difficult for about a percutaneous approach. We will be watching our new robotic navigational bronchoscopy in 2 weeks patient has agreed to be one of our first cases. - Tobacco use: The patient is a never smoker    - RTC 4-5 weeks w/ MD to go over biopsy results.

## 2022-02-15 NOTE — PROGRESS NOTES
[facility]    Outpatient follow up Note    Subjective:   Referring Physician: Marsha Bautista MD  CHIEF COMPLAINT / HPI:     The patient is 62 y.o. female who presents today for a new patient visit for abnormal CT chest.  Patient asked to come in to review her imaging and discuss upcoming bronchoscopy. Initial history:  Patient is a never smoker who had a lesion in her lungs come to medical attention after a series of falls. Patient was vacationing with friends in May 2020 when she slipped and fell and fractured her vertebrae. She had to have spine stabilization surgery and while she was rehabbing from that she fell again on her backside and repeat imaging was performed including of the chest.  At that time her chest CT showed multiple sub-5 cm pulmonary nodules but 1 nodule in the superior segment of her right lower lobe that was roughly 2 cm in size. It was thought to potentially be infectious so she was treated with antibiotics and a repeat scan was done in December 2020. There was no significant change. A further follow-up scan was performed in October of this year and the lesion persisted. A PET scan was ordered but was refused by her insurance company and she was referred to me for further evaluation. Patient has no complaints of shortness of breath or cough but is concerned as to what this thing is and why it is there. Past Medical History:    Past Medical History:   Diagnosis Date    Allergic rhinitis     Asthma     H/O- stress related    Diabetes mellitus, type 1     Fibroid     History of blood transfusion 2001?     Hyperlipidemia     Menopause ovarian failure     Other acute reactions to stress     Pancreatitis 2000    Rectal abscess     II       Social History:    Social History     Tobacco Use   Smoking Status Never Smoker   Smokeless Tobacco Never Used       Family History:  Family History   Problem Relation Age of Onset    Heart Disease Mother weakness, speech or gait abnormalities  PSYCH: Negative for anxiety, depression    Objective:   PHYSICAL EXAM:        VITALS:  BP (!) 157/97 (Site: Left Upper Arm, Position: Sitting, Cuff Size: Medium Adult)   Pulse 95   Temp 96.7 °F (35.9 °C) (Infrared)   Resp 16   Ht 5' 4.5\" (1.638 m)   Wt 195 lb (88.5 kg)   SpO2 96%   BMI 32.95 kg/m²     CONSTITUTIONAL:  Awake, alert, cooperative, no apparent distress, and appears stated age  HEENT: No oropharyngeal exudate, PERRL, no cervical adenopathy, no tracheal deviation, thyroid size normal  LUNGS:  No increased work of breathing and clear to auscultation, no crackles or wheezing   CARDIOVASCULAR:  normal S1 and S2 and no JVD  ABDOMEN:  Normal bowel sounds, non-distended and non-tender to palpation  EXT: No edema, no calf tenderness. Pulses are present bilaterally. NEUROLOGIC:  Mental Status Exam:  Level of Alertness:   awake  Orientation:   person, place, time. SKIN:  normal skin color, texture, turgor, no redness, warmth, or swelling     DATA:      Radiology Review:  Pertinent images / reports were reviewed as a part of this visit. CT chest reveals the following:    10/2021:  Impression   No change in dominant subpleural nodular area of consolidation in the right   lower lobe, with dominant subcarinal lymph node.  Given its persistence and   lack of resolution, consider PET-CT.        Last PFTs: None on file    Immunizations:   Immunization History   Administered Date(s) Administered    COVID-19, Pfizer Purple top, DILUTE for use, 12+ yrs, 30mcg/0.3mL dose 03/12/2021, 04/02/2021    Influenza Virus Vaccine 10/07/2014, 10/06/2016, 10/06/2016, 10/31/2018, 10/31/2018    Influenza Whole 10/07/2014, 10/08/2015    Influenza, Intradermal, Preservative free 10/04/2017    Influenza, Quadv, IM, (6 mo and older Fluzone, Flulaval, Fluarix and 3 yrs and older Afluria) 10/06/2016, 10/31/2018    Influenza, Quadv, IM, PF (6 mo and older Fluzone, Flulaval, Fluarix, and 3 yrs and older Afluria) 09/24/2019    Influenza, Loletta Flatten, Recombinant, IM PF (Flublok 18 yrs and older) 09/24/2019, 10/18/2020    Pneumococcal Polysaccharide ([id number]) 10/31/2018    Td (Adult), 5 Lf Tetanus Toxoid, Pf (Tenivac, Decavac) 10/01/2011    Td, (Adult) Not Adsorbed 02/19/2010    Td, unspecified formulation 10/01/2011    Tdap (Boostrix, Adacel) 02/19/2010, 10/06/2016    Zoster Recombinant (Shingrix) 09/24/2019, 01/09/2020       Assessment: This is a 62 y.o. female with 2 cm pulmonary nodule adenopathy    Plan:   -Lesion has been present for 15 months without evidence of growth and with persistent adenopathy. Malignancy is possible but seems slightly less likely, sarcoidosis is a possibility as his other forms of granulomatous disease. Unclear if the adenopathy is related to the nodule but there is a possibility. The nodule is the most specifically concerning issue so pursuit of it and biopsy seems the most appropriate next step. Alternatively an EBUS of the subcarinal node could be helpful but may not answer the question as to what this right lower lobe lesion has. As it is slow-growing I think it would be reasonable to pursue navigational bronchoscopy which should be available at [facility] via a [facility] bronchoscopic room towards the end of February/beginning of March. I would like to follow patient up in March so I can get the appropriate imaging studies in preparation for that procedure. Patient was informed that she might be the first patient in our system to get this procedure done and was okay with that after I explained why it would be difficult to biopsy that lesion by any other means. If biopsy were not an option then serial follow-up would be the other choice. Percutaneous needle biopsy of the right lower lobe lesion is not possible as the lesion is fairly central and adjacent to the spine.    - Tobacco use:    The patient is a never smoker    - RTC 2 months w/ MD. Call or RTC sooner if symptoms persist or worsen acutely.

## 2022-03-03 ENCOUNTER — ANESTHESIA EVENT (OUTPATIENT)
Dept: ENDOSCOPY | Age: 62
End: 2022-03-03
Payer: COMMERCIAL

## 2022-03-04 ENCOUNTER — ANESTHESIA (OUTPATIENT)
Dept: ENDOSCOPY | Age: 62
End: 2022-03-04
Payer: COMMERCIAL

## 2022-03-04 ENCOUNTER — HOSPITAL ENCOUNTER (OUTPATIENT)
Age: 62
Setting detail: OUTPATIENT SURGERY
Discharge: HOME | End: 2022-03-04
Attending: INTERNAL MEDICINE | Admitting: INTERNAL MEDICINE
Payer: COMMERCIAL

## 2022-03-04 ENCOUNTER — APPOINTMENT (OUTPATIENT)
Dept: GENERAL RADIOLOGY | Age: 62
End: 2022-03-04
Attending: INTERNAL MEDICINE
Payer: COMMERCIAL

## 2022-03-04 VITALS
OXYGEN SATURATION: 100 % | BODY MASS INDEX: 30.49 KG/M2 | WEIGHT: 183 LBS | TEMPERATURE: 97.8 F | SYSTOLIC BLOOD PRESSURE: 114 MMHG | RESPIRATION RATE: 16 BRPM | HEIGHT: 65 IN | DIASTOLIC BLOOD PRESSURE: 64 MMHG | HEART RATE: 100 BPM

## 2022-03-04 VITALS — DIASTOLIC BLOOD PRESSURE: 74 MMHG | SYSTOLIC BLOOD PRESSURE: 135 MMHG | TEMPERATURE: 97.5 F | OXYGEN SATURATION: 100 %

## 2022-03-04 DIAGNOSIS — R91.8 LUNG MASS: ICD-10-CM

## 2022-03-04 LAB
GLUCOSE BLD-MCNC: 136 MG/DL (ref 70–99)
GLUCOSE BLD-MCNC: 175 MG/DL (ref 70–99)
PERFORMED ON: ABNORMAL
PERFORMED ON: ABNORMAL

## 2022-03-04 PROCEDURE — 88172 CYTP DX EVAL FNA 1ST EA SITE: CPT

## 2022-03-04 PROCEDURE — 2580000003 HC RX 258: Performed by: ANESTHESIOLOGY

## 2022-03-04 PROCEDURE — 31654 BRONCH EBUS IVNTJ PERPH LES: CPT | Performed by: INTERNAL MEDICINE

## 2022-03-04 PROCEDURE — 7100000000 HC PACU RECOVERY - FIRST 15 MIN: Performed by: INTERNAL MEDICINE

## 2022-03-04 PROCEDURE — 7100000011 HC PHASE II RECOVERY - ADDTL 15 MIN: Performed by: INTERNAL MEDICINE

## 2022-03-04 PROCEDURE — 88305 TISSUE EXAM BY PATHOLOGIST: CPT

## 2022-03-04 PROCEDURE — 2720000010 HC SURG SUPPLY STERILE: Performed by: INTERNAL MEDICINE

## 2022-03-04 PROCEDURE — 3603165200 HC BRNCHSC EBUS GUIDED SAMPL 1/2 NODE STATION/STRUX: Performed by: INTERNAL MEDICINE

## 2022-03-04 PROCEDURE — 3700000001 HC ADD 15 MINUTES (ANESTHESIA): Performed by: INTERNAL MEDICINE

## 2022-03-04 PROCEDURE — 7100000010 HC PHASE II RECOVERY - FIRST 15 MIN: Performed by: INTERNAL MEDICINE

## 2022-03-04 PROCEDURE — 7100000001 HC PACU RECOVERY - ADDTL 15 MIN: Performed by: INTERNAL MEDICINE

## 2022-03-04 PROCEDURE — 6360000002 HC RX W HCPCS: Performed by: ANESTHESIOLOGY

## 2022-03-04 PROCEDURE — 3700000000 HC ANESTHESIA ATTENDED CARE: Performed by: INTERNAL MEDICINE

## 2022-03-04 PROCEDURE — 88173 CYTOPATH EVAL FNA REPORT: CPT

## 2022-03-04 PROCEDURE — 3209999900 FLUORO FOR SURGICAL PROCEDURES

## 2022-03-04 PROCEDURE — 71045 X-RAY EXAM CHEST 1 VIEW: CPT

## 2022-03-04 PROCEDURE — 6360000002 HC RX W HCPCS: Performed by: NURSE ANESTHETIST, CERTIFIED REGISTERED

## 2022-03-04 PROCEDURE — 31627 NAVIGATIONAL BRONCHOSCOPY: CPT | Performed by: INTERNAL MEDICINE

## 2022-03-04 PROCEDURE — 2500000003 HC RX 250 WO HCPCS: Performed by: NURSE ANESTHETIST, CERTIFIED REGISTERED

## 2022-03-04 PROCEDURE — 31623 DX BRONCHOSCOPE/BRUSH: CPT | Performed by: INTERNAL MEDICINE

## 2022-03-04 PROCEDURE — 2709999900 HC NON-CHARGEABLE SUPPLY: Performed by: INTERNAL MEDICINE

## 2022-03-04 PROCEDURE — 31628 BRONCHOSCOPY/LUNG BX EACH: CPT | Performed by: INTERNAL MEDICINE

## 2022-03-04 RX ORDER — ONDANSETRON 2 MG/ML
INJECTION INTRAMUSCULAR; INTRAVENOUS PRN
Status: DISCONTINUED | OUTPATIENT
Start: 2022-03-04 | End: 2022-03-04 | Stop reason: SDUPTHER

## 2022-03-04 RX ORDER — MEPERIDINE HYDROCHLORIDE 25 MG/ML
12.5 INJECTION INTRAMUSCULAR; INTRAVENOUS; SUBCUTANEOUS EVERY 5 MIN PRN
Status: DISCONTINUED | OUTPATIENT
Start: 2022-03-04 | End: 2022-03-04 | Stop reason: HOSPADM

## 2022-03-04 RX ORDER — DEXAMETHASONE SODIUM PHOSPHATE 4 MG/ML
INJECTION, SOLUTION INTRA-ARTICULAR; INTRALESIONAL; INTRAMUSCULAR; INTRAVENOUS; SOFT TISSUE PRN
Status: DISCONTINUED | OUTPATIENT
Start: 2022-03-04 | End: 2022-03-04 | Stop reason: SDUPTHER

## 2022-03-04 RX ORDER — FENTANYL CITRATE 50 UG/ML
INJECTION, SOLUTION INTRAMUSCULAR; INTRAVENOUS PRN
Status: DISCONTINUED | OUTPATIENT
Start: 2022-03-04 | End: 2022-03-04 | Stop reason: SDUPTHER

## 2022-03-04 RX ORDER — ONDANSETRON 2 MG/ML
4 INJECTION INTRAMUSCULAR; INTRAVENOUS
Status: COMPLETED | OUTPATIENT
Start: 2022-03-04 | End: 2022-03-04

## 2022-03-04 RX ORDER — FENTANYL CITRATE 50 UG/ML
50 INJECTION, SOLUTION INTRAMUSCULAR; INTRAVENOUS EVERY 5 MIN PRN
Status: DISCONTINUED | OUTPATIENT
Start: 2022-03-04 | End: 2022-03-04 | Stop reason: HOSPADM

## 2022-03-04 RX ORDER — LIDOCAINE HYDROCHLORIDE 20 MG/ML
INJECTION, SOLUTION INTRAVENOUS PRN
Status: DISCONTINUED | OUTPATIENT
Start: 2022-03-04 | End: 2022-03-04 | Stop reason: SDUPTHER

## 2022-03-04 RX ORDER — SODIUM CHLORIDE 0.9 % (FLUSH) 0.9 %
5-40 SYRINGE (ML) INJECTION PRN
Status: DISCONTINUED | OUTPATIENT
Start: 2022-03-04 | End: 2022-03-04 | Stop reason: HOSPADM

## 2022-03-04 RX ORDER — SODIUM CHLORIDE, SODIUM LACTATE, POTASSIUM CHLORIDE, CALCIUM CHLORIDE 600; 310; 30; 20 MG/100ML; MG/100ML; MG/100ML; MG/100ML
INJECTION, SOLUTION INTRAVENOUS CONTINUOUS
Status: DISCONTINUED | OUTPATIENT
Start: 2022-03-04 | End: 2022-03-04 | Stop reason: HOSPADM

## 2022-03-04 RX ORDER — HYDRALAZINE HYDROCHLORIDE 20 MG/ML
10 INJECTION INTRAMUSCULAR; INTRAVENOUS
Status: DISCONTINUED | OUTPATIENT
Start: 2022-03-04 | End: 2022-03-04 | Stop reason: HOSPADM

## 2022-03-04 RX ORDER — SUCCINYLCHOLINE CHLORIDE 20 MG/ML
INJECTION INTRAMUSCULAR; INTRAVENOUS PRN
Status: DISCONTINUED | OUTPATIENT
Start: 2022-03-04 | End: 2022-03-04 | Stop reason: SDUPTHER

## 2022-03-04 RX ORDER — FENTANYL CITRATE 50 UG/ML
25 INJECTION, SOLUTION INTRAMUSCULAR; INTRAVENOUS EVERY 5 MIN PRN
Status: DISCONTINUED | OUTPATIENT
Start: 2022-03-04 | End: 2022-03-04 | Stop reason: HOSPADM

## 2022-03-04 RX ORDER — SODIUM CHLORIDE 9 MG/ML
25 INJECTION, SOLUTION INTRAVENOUS PRN
Status: DISCONTINUED | OUTPATIENT
Start: 2022-03-04 | End: 2022-03-04 | Stop reason: HOSPADM

## 2022-03-04 RX ORDER — EPHEDRINE SULFATE 50 MG/ML
INJECTION INTRAVENOUS PRN
Status: DISCONTINUED | OUTPATIENT
Start: 2022-03-04 | End: 2022-03-04 | Stop reason: SDUPTHER

## 2022-03-04 RX ORDER — ROCURONIUM BROMIDE 10 MG/ML
INJECTION, SOLUTION INTRAVENOUS PRN
Status: DISCONTINUED | OUTPATIENT
Start: 2022-03-04 | End: 2022-03-04 | Stop reason: SDUPTHER

## 2022-03-04 RX ORDER — MIDAZOLAM HYDROCHLORIDE 1 MG/ML
INJECTION INTRAMUSCULAR; INTRAVENOUS PRN
Status: DISCONTINUED | OUTPATIENT
Start: 2022-03-04 | End: 2022-03-04 | Stop reason: SDUPTHER

## 2022-03-04 RX ORDER — DIPHENHYDRAMINE HYDROCHLORIDE 50 MG/ML
12.5 INJECTION INTRAMUSCULAR; INTRAVENOUS
Status: DISCONTINUED | OUTPATIENT
Start: 2022-03-04 | End: 2022-03-04 | Stop reason: HOSPADM

## 2022-03-04 RX ORDER — PROPOFOL 10 MG/ML
INJECTION, EMULSION INTRAVENOUS PRN
Status: DISCONTINUED | OUTPATIENT
Start: 2022-03-04 | End: 2022-03-04 | Stop reason: SDUPTHER

## 2022-03-04 RX ORDER — PROCHLORPERAZINE EDISYLATE 5 MG/ML
5 INJECTION INTRAMUSCULAR; INTRAVENOUS
Status: COMPLETED | OUTPATIENT
Start: 2022-03-04 | End: 2022-03-04

## 2022-03-04 RX ORDER — SODIUM CHLORIDE 0.9 % (FLUSH) 0.9 %
5-40 SYRINGE (ML) INJECTION EVERY 12 HOURS SCHEDULED
Status: DISCONTINUED | OUTPATIENT
Start: 2022-03-04 | End: 2022-03-04 | Stop reason: HOSPADM

## 2022-03-04 RX ADMIN — SUCCINYLCHOLINE CHLORIDE 120 MG: 20 INJECTION, SOLUTION INTRAMUSCULAR; INTRAVENOUS; PARENTERAL at 13:16

## 2022-03-04 RX ADMIN — PHENYLEPHRINE HYDROCHLORIDE 200 MCG: 10 INJECTION, SOLUTION INTRAMUSCULAR; INTRAVENOUS; SUBCUTANEOUS at 13:59

## 2022-03-04 RX ADMIN — SODIUM CHLORIDE, SODIUM LACTATE, POTASSIUM CHLORIDE, AND CALCIUM CHLORIDE: .6; .31; .03; .02 INJECTION, SOLUTION INTRAVENOUS at 15:16

## 2022-03-04 RX ADMIN — ROCURONIUM BROMIDE 30 MG: 10 INJECTION INTRAVENOUS at 14:07

## 2022-03-04 RX ADMIN — SUGAMMADEX 100 MG: 100 INJECTION, SOLUTION INTRAVENOUS at 15:17

## 2022-03-04 RX ADMIN — ROCURONIUM BROMIDE 10 MG: 10 INJECTION INTRAVENOUS at 13:16

## 2022-03-04 RX ADMIN — PHENYLEPHRINE HYDROCHLORIDE 200 MCG: 10 INJECTION, SOLUTION INTRAMUSCULAR; INTRAVENOUS; SUBCUTANEOUS at 14:13

## 2022-03-04 RX ADMIN — EPHEDRINE SULFATE 10 MG: 50 INJECTION INTRAVENOUS at 13:33

## 2022-03-04 RX ADMIN — ROCURONIUM BROMIDE 60 MG: 10 INJECTION INTRAVENOUS at 13:24

## 2022-03-04 RX ADMIN — MIDAZOLAM HYDROCHLORIDE 2 MG: 2 INJECTION, SOLUTION INTRAMUSCULAR; INTRAVENOUS at 13:13

## 2022-03-04 RX ADMIN — PHENYLEPHRINE HYDROCHLORIDE 200 MCG: 10 INJECTION, SOLUTION INTRAMUSCULAR; INTRAVENOUS; SUBCUTANEOUS at 13:33

## 2022-03-04 RX ADMIN — SUGAMMADEX 100 MG: 100 INJECTION, SOLUTION INTRAVENOUS at 15:15

## 2022-03-04 RX ADMIN — PHENYLEPHRINE HYDROCHLORIDE 200 MCG: 10 INJECTION, SOLUTION INTRAMUSCULAR; INTRAVENOUS; SUBCUTANEOUS at 14:50

## 2022-03-04 RX ADMIN — DEXAMETHASONE SODIUM PHOSPHATE 4 MG: 4 INJECTION, SOLUTION INTRAMUSCULAR; INTRAVENOUS at 13:24

## 2022-03-04 RX ADMIN — ONDANSETRON 4 MG: 2 INJECTION INTRAMUSCULAR; INTRAVENOUS at 13:24

## 2022-03-04 RX ADMIN — FENTANYL CITRATE 50 MCG: 50 INJECTION, SOLUTION INTRAMUSCULAR; INTRAVENOUS at 13:16

## 2022-03-04 RX ADMIN — EPHEDRINE SULFATE 10 MG: 50 INJECTION INTRAVENOUS at 13:24

## 2022-03-04 RX ADMIN — EPHEDRINE SULFATE 10 MG: 50 INJECTION INTRAVENOUS at 13:40

## 2022-03-04 RX ADMIN — LIDOCAINE HYDROCHLORIDE 100 MG: 20 INJECTION, SOLUTION INTRAVENOUS at 13:16

## 2022-03-04 RX ADMIN — SODIUM CHLORIDE, SODIUM LACTATE, POTASSIUM CHLORIDE, AND CALCIUM CHLORIDE: .6; .31; .03; .02 INJECTION, SOLUTION INTRAVENOUS at 13:06

## 2022-03-04 RX ADMIN — FENTANYL CITRATE 25 MCG: 50 INJECTION, SOLUTION INTRAMUSCULAR; INTRAVENOUS at 14:58

## 2022-03-04 RX ADMIN — PHENYLEPHRINE HYDROCHLORIDE 200 MCG: 10 INJECTION, SOLUTION INTRAMUSCULAR; INTRAVENOUS; SUBCUTANEOUS at 13:20

## 2022-03-04 RX ADMIN — EPHEDRINE SULFATE 10 MG: 50 INJECTION INTRAVENOUS at 14:01

## 2022-03-04 RX ADMIN — EPHEDRINE SULFATE 10 MG: 50 INJECTION INTRAVENOUS at 13:31

## 2022-03-04 RX ADMIN — PROPOFOL 200 MG: 10 INJECTION, EMULSION INTRAVENOUS at 13:16

## 2022-03-04 RX ADMIN — FENTANYL CITRATE 25 MCG: 50 INJECTION, SOLUTION INTRAMUSCULAR; INTRAVENOUS at 14:44

## 2022-03-04 RX ADMIN — PHENYLEPHRINE HYDROCHLORIDE 200 MCG: 10 INJECTION, SOLUTION INTRAMUSCULAR; INTRAVENOUS; SUBCUTANEOUS at 13:31

## 2022-03-04 RX ADMIN — ONDANSETRON 4 MG: 2 INJECTION INTRAMUSCULAR; INTRAVENOUS at 15:41

## 2022-03-04 RX ADMIN — PHENYLEPHRINE HYDROCHLORIDE 200 MCG: 10 INJECTION, SOLUTION INTRAMUSCULAR; INTRAVENOUS; SUBCUTANEOUS at 13:50

## 2022-03-04 RX ADMIN — ROCURONIUM BROMIDE 20 MG: 10 INJECTION INTRAVENOUS at 14:59

## 2022-03-04 RX ADMIN — PROCHLORPERAZINE EDISYLATE 5 MG: 5 INJECTION INTRAMUSCULAR; INTRAVENOUS at 16:11

## 2022-03-04 RX ADMIN — PHENYLEPHRINE HYDROCHLORIDE 200 MCG: 10 INJECTION, SOLUTION INTRAMUSCULAR; INTRAVENOUS; SUBCUTANEOUS at 13:22

## 2022-03-04 RX ADMIN — SUGAMMADEX 100 MG: 100 INJECTION, SOLUTION INTRAVENOUS at 15:10

## 2022-03-04 ASSESSMENT — PULMONARY FUNCTION TESTS
PIF_VALUE: 0
PIF_VALUE: 34
PIF_VALUE: 34
PIF_VALUE: 35
PIF_VALUE: 32
PIF_VALUE: 25
PIF_VALUE: 35
PIF_VALUE: 34
PIF_VALUE: 33
PIF_VALUE: 33
PIF_VALUE: 35
PIF_VALUE: 31
PIF_VALUE: 33
PIF_VALUE: 31
PIF_VALUE: 33
PIF_VALUE: 21
PIF_VALUE: 21
PIF_VALUE: 32
PIF_VALUE: 34
PIF_VALUE: 36
PIF_VALUE: 22
PIF_VALUE: 34
PIF_VALUE: 34
PIF_VALUE: 32
PIF_VALUE: 34
PIF_VALUE: 29
PIF_VALUE: 36
PIF_VALUE: 35
PIF_VALUE: 1
PIF_VALUE: 3
PIF_VALUE: 36
PIF_VALUE: 18
PIF_VALUE: 33
PIF_VALUE: 1
PIF_VALUE: 34
PIF_VALUE: 0
PIF_VALUE: 1
PIF_VALUE: 25
PIF_VALUE: 35
PIF_VALUE: 34
PIF_VALUE: 32
PIF_VALUE: 34
PIF_VALUE: 23
PIF_VALUE: 21
PIF_VALUE: 34
PIF_VALUE: 27
PIF_VALUE: 22
PIF_VALUE: 35
PIF_VALUE: 0
PIF_VALUE: 21
PIF_VALUE: 35
PIF_VALUE: 34
PIF_VALUE: 31
PIF_VALUE: 34
PIF_VALUE: 34
PIF_VALUE: 22
PIF_VALUE: 36
PIF_VALUE: 16
PIF_VALUE: 22
PIF_VALUE: 1
PIF_VALUE: 37
PIF_VALUE: 0
PIF_VALUE: 35
PIF_VALUE: 32
PIF_VALUE: 36
PIF_VALUE: 34
PIF_VALUE: 24
PIF_VALUE: 21
PIF_VALUE: 33
PIF_VALUE: 21
PIF_VALUE: 36
PIF_VALUE: 34
PIF_VALUE: 36
PIF_VALUE: 32
PIF_VALUE: 32
PIF_VALUE: 35
PIF_VALUE: 22
PIF_VALUE: 36
PIF_VALUE: 35
PIF_VALUE: 21
PIF_VALUE: 22
PIF_VALUE: 36
PIF_VALUE: 35
PIF_VALUE: 21
PIF_VALUE: 35
PIF_VALUE: 34
PIF_VALUE: 4
PIF_VALUE: 36
PIF_VALUE: 30
PIF_VALUE: 2
PIF_VALUE: 0
PIF_VALUE: 34
PIF_VALUE: 33
PIF_VALUE: 34
PIF_VALUE: 22
PIF_VALUE: 35
PIF_VALUE: 33
PIF_VALUE: 22
PIF_VALUE: 35
PIF_VALUE: 0
PIF_VALUE: 35
PIF_VALUE: 34
PIF_VALUE: 35
PIF_VALUE: 36
PIF_VALUE: 22
PIF_VALUE: 20
PIF_VALUE: 33
PIF_VALUE: 36
PIF_VALUE: 34
PIF_VALUE: 34
PIF_VALUE: 22
PIF_VALUE: 34
PIF_VALUE: 22
PIF_VALUE: 20
PIF_VALUE: 35
PIF_VALUE: 34
PIF_VALUE: 21
PIF_VALUE: 34
PIF_VALUE: 27
PIF_VALUE: 26
PIF_VALUE: 34
PIF_VALUE: 34
PIF_VALUE: 32
PIF_VALUE: 30
PIF_VALUE: 34
PIF_VALUE: 36
PIF_VALUE: 36
PIF_VALUE: 17
PIF_VALUE: 33
PIF_VALUE: 33
PIF_VALUE: 26
PIF_VALUE: 21
PIF_VALUE: 39
PIF_VALUE: 21
PIF_VALUE: 1
PIF_VALUE: 33
PIF_VALUE: 0
PIF_VALUE: 34

## 2022-03-04 ASSESSMENT — PAIN SCALES - GENERAL
PAINLEVEL_OUTOF10: 0
PAINLEVEL_OUTOF10: 0

## 2022-03-04 ASSESSMENT — LIFESTYLE VARIABLES: SMOKING_STATUS: 0

## 2022-03-04 NOTE — PROCEDURES
Diagnosis:  Pulmonary nodule    PROCEDURE: Robotic navigational bronchoscopy with fluoroscopic and radial probe ultrasound-guided biopsy of peripheral pulmonary nodule    DESCRIPTION OF PROCEDURE: Informed consent was obtained from the patient after explaining the risks and benefits. A time out was taken. Total intravenous anesthesia was kindly provided by the anesthetist.     Initially a therapeutic bronchoscope was used to perform a complete airway inspection. The trachea appeared normal.  The bronchial trees were examined to the subsegmental level. There was no masses or mucosal abnormalities seen and a small amount of clear secretions were cleared. Therapeutic aspiration was not performed. Next the [facility] navigational bronchoscope by Bushra Teixeira was used to navigate to the peripheral nodule which was in the RLL. When nodule was identified as being in range of the tip of the scope I paused the robot to lock it into position. I then I used fluoroscopy to visualize my biopsy with a transbronchial needle. I then used the radial probe ultrasound to follow the same path as the needle. Radial probe ultrasound imaging showed that I was within the lesion. I then transitioned to a transbronchial brush for biopsy and alternated between transbronchial needle and forceps biopsies. Pathology confirmed that I had adequate tissue for analysis but could not comment on a potential diagnosis. EBL: Minimal    The patient tolerated the procedure well. Recovery will be per the anesthesia team.      FOLLOW UP:  is with me in approximately three to five days. Patient is instructed to call with concerns and if follow up has not already been scheduled. In the event of severe symptoms or if the patient is unable to reach my office, instructions are given to proceed to the emergency department.      Lorice Severin, MD, MD

## 2022-03-04 NOTE — PROGRESS NOTES
PACU Transfer to [facility]    Vitals:    03/04/22 1650   BP: 116/67   Pulse: 107   Resp: 18   Temp: 98 °F (36.7 °C)   SpO2: 100%         Intake/Output Summary (Last 24 hours) at 3/4/2022 1655  Last data filed at 3/4/2022 1516  Gross per 24 hour   Intake 1000 ml   Output    Net 1000 ml       Pain assessment:  none  Pain Level: 0    Patient transferred to care of [facility] RN.    3/4/2022 4:55 PM

## 2022-03-04 NOTE — PROGRESS NOTES
Patient recieved from PACU, patient alert and oriented X3 . Patient having no complaints at this time. Patients sister called.

## 2022-03-04 NOTE — PROGRESS NOTES
Reports feels anxious. Wants to get OOB. Repositioned. CXR results not in yet.  SpO2 remains mid 90s (96% right now)

## 2022-03-04 NOTE — ANESTHESIA POSTPROCEDURE EVALUATION
Department of Anesthesiology  Postprocedure Note    Patient: Ivon Fowler  MRN: [id number]  YOB: 1960  Date of evaluation: 3/4/2022  Time:  4:39 PM     Procedure Summary     Date: 03/04/22 Room / Location: [address] [place] [address] / [facility] [place] [place]    Anesthesia Start: 7959 Anesthesia Stop: 5304    Procedure: NAVIGATIONAL BRONCHOSCOPY WITH [facility] AND RADIAL PROBE. ENDOBRONCHIAL ULTRASOUND (N/A ) Diagnosis:       Lung mass      (Lung mass [R91.8])    Surgeons: Lorice Severin, MD Responsible Provider: Nahomy Mayers MD    Anesthesia Type: general ASA Status: 3          Anesthesia Type: general    Saul Phase I: Saul Score: 10    Saul Phase II:      Last vitals: Reviewed and per EMR flowsheets.        Anesthesia Post Evaluation    Patient location during evaluation: PACU  Level of consciousness: awake  Complications: no  Multimodal analgesia pain management approach

## 2022-03-04 NOTE — ANESTHESIA PRE PROCEDURE
Department of Anesthesiology  Preprocedure Note       Name:  Mahesh Emelia   Age:  62 y.o.  :  1960                                          MRN:  [id number]         Date:  3/4/2022      Surgeon: Danielle Andrade):  Kenzie Rees MD    Procedure: Procedure(s):  NAVIGATIONAL BRONCHOSCOPY WITH [facility] AND RADIAL PROBE. ENDOBRONCHIAL ULTRASOUND    Medications prior to admission:   Prior to Admission medications    Medication Sig Start Date End Date Taking? Authorizing Provider   ALPRAZolam Hardeekaitlyn Floresoway) 1 MG tablet Take 1 tablet by mouth nightly as needed for Sleep for up to 90 days. 22  Steffany Roach MD   Omega-3 Fatty Acids (FISH OIL PO) Take by mouth    Historical Provider, MD   Cyanocobalamin (B-12 PO) Take by mouth    Historical Provider, MD   ramipril (ALTACE) 10 MG capsule TAKE 1 CAPSULE DAILY FOR BLOOD PRESSURE 21   Steffany Roach MD   sertraline (ZOLOFT) 50 MG tablet TAKE 1 TABLET DAILY FOR MOOD 21   Steffany Roach MD   insulin glargine (BASAGLAR KWIKPEN) 100 UNIT/ML injection pen INJECT 50 UNITS UNDER THE SKIN ONCE NIGHTLY 10/26/21   Mee Scott MD   atorvastatin (LIPITOR) 20 MG tablet TAKE 1 TABLET DAILY 21   Mee Scott MD   metFORMIN (GLUCOPHAGE) 850 MG tablet TAKE 1 TABLET TWICE A DAY WITH MEALS 21   Mee Scott MD   gemfibrozil (LOPID) 600 MG tablet TAKE 1 TABLET TWICE A DAY FOR TRIGLYCERIDES 21   Mee Scott MD   insulin glargine [facility]) 100 UNIT/ML injection pen Inject 15 Units into the skin nightly 21   Mee Scott MD   Insulin Pen Needle (B-D UF III MINI PEN NEEDLES) 31G X 5 MM MISC Inject insulin 4 times a day 18   Taylor Smith MD   Glucosamine-Chondroitin (GLUCOSAMINE CHONDR COMPLEX PO) Take by mouth daily    Historical Provider, MD       Current medications:    No current facility-administered medications for this encounter. Allergies:     Allergies   Allergen Reactions    Morphine Other (See Comments) Unsure of reaction; Pt says she got mean        Problem List:    Patient Active Problem List   Diagnosis Code    Hypertension I10    Hyperlipidemia E78.5    Other acute reactions to stress F43.8    Situational mixed anxiety and depressive disorder F43.23    Type 2 diabetes mellitus without complication, with long-term current use of insulin (HCC) E11.9, Z79.4       Past Medical History:        Diagnosis Date    Allergic rhinitis     Asthma     H/O- stress related    Diabetes mellitus, type 1     Fibroid     History of blood transfusion 2001?  Hyperlipidemia     Menopause ovarian failure     Other acute reactions to stress     Pancreatitis 2000    Rectal abscess     II       Past Surgical History:        Procedure Laterality Date    BACK SURGERY N/A 07/29/2020    COLONOSCOPY      HYSTERECTOMY      HYSTERECTOMY, TOTAL ABDOMINAL      KNEE ARTHROSCOPY Right 2014 x2    Dr. Mehnaz Coronado ARTHROSCOPY Left 2015    OTHER SURGICAL HISTORY  6/1/16    EUS, no interventions    OTHER SURGICAL HISTORY  6/2/16    EXAMINATION UNDER ANESTHESIA, EXCISION OF ANAL MASS    OVARY REMOVAL Bilateral     TUBAL LIGATION         Social History:    Social History     Tobacco Use    Smoking status: Never Smoker    Smokeless tobacco: Never Used   Substance Use Topics    Alcohol use: Yes     Comment: socially                                 Counseling given: Not Answered      Vital Signs (Current): There were no vitals filed for this visit.                                            BP Readings from Last 3 Encounters:   02/15/22 (!) 157/97   12/21/21 138/88   07/28/21 (!) 156/90       NPO Status: Time of last liquid consumption: 2000                        Time of last solid consumption: 2000                                                      BMI:   Wt Readings from Last 3 Encounters:   02/15/22 195 lb (88.5 kg)   12/21/21 190 lb (86.2 kg)   07/28/21 186 lb 12.8 oz (84.7 kg)     There is no height or weight on file to calculate BMI.    CBC:   Lab Results   Component Value Date    WBC 8.2 02/12/2021    RBC 3.91 02/12/2021    HGB 12.1 02/12/2021    HCT 37.4 02/12/2021    MCV 95.7 02/12/2021    RDW 12.1 02/12/2021     02/12/2021       CMP:   Lab Results   Component Value Date     08/09/2021    K 4.2 08/09/2021     08/09/2021    CO2 27 08/09/2021    BUN 13 08/09/2021    CREATININE 0.53 08/09/2021    GFRAA >60 08/05/2021    AGRATIO 1.5 08/05/2021    LABGLOM >60 08/05/2021    GLUCOSE 151 08/09/2021    PROT 6.9 08/09/2021    CALCIUM 10.0 08/09/2021    BILITOT 0.5 08/09/2021    ALKPHOS 71 08/09/2021    AST 18 08/09/2021    ALT 17 08/09/2021       POC Tests: No results for input(s): POCGLU, POCNA, POCK, POCCL, POCBUN, POCHEMO, POCHCT in the last 72 hours.     Coags:   Lab Results   Component Value Date    PROTIME 11.3 07/24/2020    INR 0.97 07/24/2020    APTT 30.5 07/24/2020       HCG (If Applicable): No results found for: PREGTESTUR, PREGSERUM, HCG, HCGQUANT     ABGs: No results found for: PHART, PO2ART, ZJN7LCA, NMV4LSW, BEART, X3RGLLHL     Type & Screen (If Applicable):  No results found for: LABABO, LABRH    Drug/Infectious Status (If Applicable):  No results found for: HIV, HEPCAB    COVID-19 Screening (If Applicable): No results found for: COVID19        Anesthesia Evaluation  Patient summary reviewed and Nursing notes reviewed no history of anesthetic complications:   Airway: Mallampati: II  TM distance: >3 FB   Neck ROM: full  Mouth opening: > = 3 FB Dental: normal exam         Pulmonary: breath sounds clear to auscultation  (+) asthma:     (-) not a current smoker (never)                          ROS comment: Right Lung nodule x 2 yrs  Has changed morphologically for  navagational bronchoscope bx   Cardiovascular:  Exercise tolerance: good (>4 METS),   (+) hypertension: moderate,     (-) past MI    NYHA Classification: II    Rhythm: regular  Rate: normal           Beta Blocker:  Not on Beta Blocker         Neuro/Psych:   Negative Neuro/Psych ROS              GI/Hepatic/Renal:        (-) GERD       Endo/Other:    (+) DiabetesType II DM, well controlled, using insulin, . Abdominal:             Vascular: negative vascular ROS. Other Findings:             Anesthesia Plan      general     ASA 3       Induction: intravenous. MIPS: Prophylactic antiemetics administered. Anesthetic plan and risks discussed with patient. Plan discussed with CRNA.     Attending anesthesiologist reviewed and agrees with Preprocedure content              Sonia Gillespie DO   3/4/2022

## 2022-03-04 NOTE — H&P
History and Physical / Pre-Sedation Assessment    Patient:  Fani Florence   :   1960     Intended Procedure:  Robotic navigational bronchoscopy for biopsy of pulmonary nodule    HPI: 62 y.o. female with a waxing and waning RLL nodule    Past Medical History:      Diagnosis Date    Allergic rhinitis     Asthma     H/O- stress related    Diabetes mellitus, type 1     Fibroid     History of blood transfusion ?  Hyperlipidemia     Menopause ovarian failure     Other acute reactions to stress     Pancreatitis     Rectal abscess     II      Past Surgical History:        Procedure Laterality Date    BACK SURGERY N/A 2020    COLONOSCOPY      HYSTERECTOMY      HYSTERECTOMY, TOTAL ABDOMINAL      KNEE ARTHROSCOPY Right 2014 x2    Dr. Juan Pablo Wynne ARTHROSCOPY Left 2015    OTHER SURGICAL HISTORY  16    EUS, no interventions    OTHER SURGICAL HISTORY  16    EXAMINATION UNDER ANESTHESIA, EXCISION OF ANAL MASS    OVARY REMOVAL Bilateral     TUBAL LIGATION         Social History:    TOBACCO:   reports that she has never smoked. She has never used smokeless tobacco.  ETOH:   reports current alcohol use. Family History:       Problem Relation Age of Onset    Heart Disease Mother     Diabetes Father          Allergies:    Allergies   Allergen Reactions    Morphine Other (See Comments)     Unsure of reaction; Pt says she got mean        Physical Exam:  Vital Signs: /68   Pulse 106   Temp 97 °F (36.1 °C) (Temporal)   Resp 12   Ht 5' 4.5\" (1.638 m)   Wt 183 lb (83 kg)   SpO2 98%   BMI 30.93 kg/m²    Pulmonary:Normal  Cardiac:Normal  Abdomen:Normal    Mallampati:  2    Pre-Procedure Assessment / Plan:  ASA CLASS    I. Normal, healthy adult      Level of Sedation Plan: General anesthesia  Post Procedure plan: Return to same level of care    I assessed the patient and find that the patient is in satisfactory condition to proceed with the planned procedure and sedation plan. There also have been no significant changes since the previous H&P. I have explained the risk, benefits, and alternatives to the procedure. The patient understands and agrees to proceed.   Yes    Kevyn Adjutant, MD  4:31 PM 3/4/2022

## 2022-03-04 NOTE — PROGRESS NOTES
1531 Admitted to PACU from [facility]. Connected to monitor. Report at bedside. Denies pain and nausea except sore throat. Ice chips given.

## 2022-03-17 ENCOUNTER — OFFICE VISIT (OUTPATIENT)
Dept: PULMONOLOGY | Age: 62
End: 2022-03-17
Payer: COMMERCIAL

## 2022-03-17 VITALS
OXYGEN SATURATION: 98 % | BODY MASS INDEX: 32.15 KG/M2 | HEART RATE: 91 BPM | RESPIRATION RATE: 16 BRPM | HEIGHT: 65 IN | TEMPERATURE: 96.8 F | SYSTOLIC BLOOD PRESSURE: 148 MMHG | WEIGHT: 193 LBS | DIASTOLIC BLOOD PRESSURE: 84 MMHG

## 2022-03-17 DIAGNOSIS — R91.1 PULMONARY NODULE: Primary | ICD-10-CM

## 2022-03-17 PROCEDURE — G8484 FLU IMMUNIZE NO ADMIN: HCPCS | Performed by: INTERNAL MEDICINE

## 2022-03-17 PROCEDURE — 99212 OFFICE O/P EST SF 10 MIN: CPT | Performed by: INTERNAL MEDICINE

## 2022-03-17 PROCEDURE — 3017F COLORECTAL CA SCREEN DOC REV: CPT | Performed by: INTERNAL MEDICINE

## 2022-03-17 PROCEDURE — G8427 DOCREV CUR MEDS BY ELIG CLIN: HCPCS | Performed by: INTERNAL MEDICINE

## 2022-03-17 PROCEDURE — 1036F TOBACCO NON-USER: CPT | Performed by: INTERNAL MEDICINE

## 2022-03-17 PROCEDURE — G8417 CALC BMI ABV UP PARAM F/U: HCPCS | Performed by: INTERNAL MEDICINE

## 2022-03-17 NOTE — PROGRESS NOTES
[facility]    Outpatient follow-up note    Subjective:   Referring Physician: Jeannine Strauss MD  [address] [facility]:     The patient is 62 y.o. female who presents today for a follow-up visit for abnormal CT chest. Had her bronchoscopy a couple weeks ago and here to go over the results. No new complaints. Initial visit: Patient is a never smoker who had a lesion in her lungs come to medical attention after a series of falls. Patient was vacationing with friends in May 2020 when she slipped and fell and fractured her vertebrae. She had to have spine stabilization surgery and while she was rehabbing from that she fell again on her backside and repeat imaging was performed including of the chest.  At that time her chest CT showed multiple sub-5 cm pulmonary nodules but 1 nodule in the superior segment of her right lower lobe that was roughly 2 cm in size. It was thought to potentially be infectious so she was treated with antibiotics and a repeat scan was done in December 2020. There was no significant change. A further follow-up scan was performed in October of this year and the lesion persisted. A PET scan was ordered but was refused by her insurance company and she was referred to me for further evaluation. Patient has no complaints of shortness of breath or cough but is concerned as to what this thing is and why it is there. Past Medical History:    Past Medical History:   Diagnosis Date    Allergic rhinitis     Asthma     H/O- stress related    Diabetes mellitus, type 1     Fibroid     History of blood transfusion 2001?     Hyperlipidemia     Menopause ovarian failure     Other acute reactions to stress     Pancreatitis 2000    Rectal abscess     II       Social History:    Social History     Tobacco Use   Smoking Status Never Smoker   Smokeless Tobacco Never Used       Family History:  Family History   Problem Relation Age of Onset    Heart Disease Mother     Diabetes Father      Current Medications:  Current Outpatient Medications on File Prior to Visit   Medication Sig Dispense Refill    ALPRAZolam (XANAX) 1 MG tablet Take 1 tablet by mouth nightly as needed for Sleep for up to 90 days. 90 tablet 0    Omega-3 Fatty Acids (FISH OIL PO) Take by mouth      Cyanocobalamin (B-12 PO) Take by mouth      ramipril (ALTACE) 10 MG capsule TAKE 1 CAPSULE DAILY FOR BLOOD PRESSURE 90 capsule 3    sertraline (ZOLOFT) 50 MG tablet TAKE 1 TABLET DAILY FOR MOOD 90 tablet 3    insulin glargine (BASAGLAR KWIKPEN) 100 UNIT/ML injection pen INJECT 50 UNITS UNDER THE SKIN ONCE NIGHTLY 5 pen 3    atorvastatin (LIPITOR) 20 MG tablet TAKE 1 TABLET DAILY 90 tablet 1    metFORMIN (GLUCOPHAGE) 850 MG tablet TAKE 1 TABLET TWICE A DAY WITH MEALS 180 tablet 1    gemfibrozil (LOPID) 600 MG tablet TAKE 1 TABLET TWICE A DAY FOR TRIGLYCERIDES 180 tablet 1    insulin glargine (BASAGLAR KWIKPEN) 100 UNIT/ML injection pen Inject 15 Units into the skin nightly 5 pen 3    Insulin Pen Needle (B-D UF III MINI PEN NEEDLES) 31G X 5 MM MISC Inject insulin 4 times a day 120 each 6    Glucosamine-Chondroitin (GLUCOSAMINE CHONDR COMPLEX PO) Take by mouth daily       No current facility-administered medications on file prior to visit. Allergies:   Allergies   Allergen Reactions    Morphine Other (See Comments)     Unsure of reaction; Pt says she got mean        REVIEW OF SYSTEMS:    CONSTITUTIONAL: Negative for fevers and chills  HEENT: Negative for oropharyngeal exudate, post nasal drip, sinus pain / pressure, nasal congestion, ear pain  RESPIRATORY:  See HPI  CARDIOVASCULAR: Negative for chest pain, palpitations, edema  GASTROINTESTINAL: Negative for nausea, vomiting, diarrhea, constipation and abdominal pain  HEMATOLOGICAL: Negative for adenopathy  SKIN: Negative for clubbing, cyanosis, skin lesions  EXTREMITIES: Negative for weakness, decreased ROM  NEUROLOGICAL: Negative for unilateral weakness, speech or gait abnormalities  PSYCH: Negative for anxiety, depression    Objective:   PHYSICAL EXAM:        VITALS:  BP (!) 148/84 (Site: Left Upper Arm, Position: Sitting, Cuff Size: Medium Adult)   Pulse 91   Temp 96.8 °F (36 °C) (Infrared)   Resp 16   Ht 5' 4.5\" (1.638 m)   Wt 193 lb (87.5 kg)   SpO2 98%   Breastfeeding No   BMI 32.62 kg/m²     CONSTITUTIONAL:  Awake, alert, cooperative, no apparent distress, and appears stated age  HEENT: No oropharyngeal exudate, PERRL, no cervical adenopathy, no tracheal deviation, thyroid size normal  LUNGS:  No increased work of breathing and clear to auscultation, no crackles or wheezing   CARDIOVASCULAR:  normal S1 and S2 and no JVD  ABDOMEN:  Normal bowel sounds, non-distended and non-tender to palpation  EXT: No edema, no calf tenderness. Pulses are present bilaterally. NEUROLOGIC:  Mental Status Exam:  Level of Alertness:   awake  Orientation:   person, place, time. SKIN:  normal skin color, texture, turgor, no redness, warmth, or swelling     DATA:      Radiology Review:  Pertinent images / reports were reviewed as a part of this visit. CT chest reveals the following:    10/2021:  Impression   No change in dominant subpleural nodular area of consolidation in the right   lower lobe, with dominant subcarinal lymph node.  Given its persistence and   lack of resolution, consider PET-CT.        Last PFTs: None on file    Immunizations:   Immunization History   Administered Date(s) Administered    COVID-19, Pfizer Purple top, DILUTE for use, 12+ yrs, 30mcg/0.3mL dose 03/12/2021, 04/02/2021    Influenza Virus Vaccine 10/07/2014, 10/06/2016, 10/06/2016, 10/31/2018, 10/31/2018    Influenza Whole 10/07/2014, 10/08/2015    Influenza, Intradermal, Preservative free 10/04/2017    Influenza, Quadv, IM, (6 mo and older Fluzone, Flulaval, Fluarix and 3 yrs and older Afluria) 10/06/2016, 10/31/2018    Influenza, Quadv, IM, PF (6 mo and older Fluzone, Flulaval, Fluarix, and 3 yrs and older Afluria) 09/24/2019    Influenza, Hathorne Schlossman, Recombinant, IM PF (Flublok 18 yrs and older) 09/24/2019, 10/18/2020    Pneumococcal Polysaccharide ([id number]) 10/31/2018    Td (Adult), 5 Lf Tetanus Toxoid, Pf (Tenivac, Decavac) 10/01/2011    Td, (Adult) Not Adsorbed 02/19/2010    Td, unspecified formulation 10/01/2011    Tdap (Boostrix, Adacel) 02/19/2010, 10/06/2016    Zoster Recombinant (Shingrix) 09/24/2019, 01/09/2020       Assessment: This is a 62 y.o. female with 2 cm right lower lobe pulmonary nodule     Plan:   -Pathology consistent with acute on chronic inflammation, not malignancy, which is re-assuring. Size justified the biopsy. Will observe until September when it will have been present for 2 years since first identification. If stable or smaller on September scan then can deem it non-malignant and can stop following. Diagnosis Orders   1. Pulmonary nodule  CT CHEST WO CONTRAST         - Tobacco use: The patient is a never smoker    - RTC 6 months w/ MD to go over biopsy results.

## 2022-04-04 ENCOUNTER — TELEMEDICINE (OUTPATIENT)
Dept: INTERNAL MEDICINE CLINIC | Age: 62
End: 2022-04-04
Payer: COMMERCIAL

## 2022-04-04 DIAGNOSIS — F43.23 SITUATIONAL MIXED ANXIETY AND DEPRESSIVE DISORDER: Primary | ICD-10-CM

## 2022-04-04 PROCEDURE — G8427 DOCREV CUR MEDS BY ELIG CLIN: HCPCS | Performed by: INTERNAL MEDICINE

## 2022-04-04 PROCEDURE — 3017F COLORECTAL CA SCREEN DOC REV: CPT | Performed by: INTERNAL MEDICINE

## 2022-04-04 PROCEDURE — 99213 OFFICE O/P EST LOW 20 MIN: CPT | Performed by: INTERNAL MEDICINE

## 2022-04-04 RX ORDER — VITAMIN A 10000 UNIT
TABLET ORAL 2 TIMES DAILY
COMMUNITY

## 2022-04-04 RX ORDER — ALPRAZOLAM 1 MG/1
1 TABLET ORAL NIGHTLY PRN
Qty: 90 TABLET | Refills: 0 | Status: SHIPPED | OUTPATIENT
Start: 2022-04-04 | End: 2022-07-01 | Stop reason: SDUPTHER

## 2022-04-04 SDOH — ECONOMIC STABILITY: FOOD INSECURITY: WITHIN THE PAST 12 MONTHS, YOU WORRIED THAT YOUR FOOD WOULD RUN OUT BEFORE YOU GOT MONEY TO BUY MORE.: NEVER TRUE

## 2022-04-04 SDOH — ECONOMIC STABILITY: FOOD INSECURITY: WITHIN THE PAST 12 MONTHS, THE FOOD YOU BOUGHT JUST DIDN'T LAST AND YOU DIDN'T HAVE MONEY TO GET MORE.: NEVER TRUE

## 2022-04-04 ASSESSMENT — PATIENT HEALTH QUESTIONNAIRE - PHQ9
SUM OF ALL RESPONSES TO PHQ QUESTIONS 1-9: 0
1. LITTLE INTEREST OR PLEASURE IN DOING THINGS: 0
2. FEELING DOWN, DEPRESSED OR HOPELESS: 0
SUM OF ALL RESPONSES TO PHQ9 QUESTIONS 1 & 2: 0
SUM OF ALL RESPONSES TO PHQ QUESTIONS 1-9: 0

## 2022-04-04 ASSESSMENT — SOCIAL DETERMINANTS OF HEALTH (SDOH): HOW HARD IS IT FOR YOU TO PAY FOR THE VERY BASICS LIKE FOOD, HOUSING, MEDICAL CARE, AND HEATING?: NOT HARD AT ALL

## 2022-04-04 NOTE — PROGRESS NOTES
Cris Nielsen (:  1960) is a Established patient, here for evaluation of the following:    Assessment & Plan   Below is the assessment and plan developed based on review of pertinent history, physical exam, labs, studies, and medications. 1. Situational mixed anxiety and depressive disorder  -Overall stable, OARRS reviewed, consistent with use  -Since stress is decreased, can try decreasing alprazolam dose on some nights to 1/2 tablet  -Continue sertraline 50 mg daily  -     ALPRAZolam (XANAX) 1 MG tablet; Take 1 tablet by mouth nightly as needed for Sleep for up to 90 days. , Disp-90 tablet, R-0Normal    Return in about 3 months (around 2022) for anxiety/sleep. Subjective   HPI     Anxiety/sleep - alprazolam helps getting through night, does not take during the day. 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 She is still taking the sertraline as well. Review of Systems       Objective   Patient-Reported Vitals  No data recorded     Physical Exam  Vitals reviewed. Constitutional:       General: She is not in acute distress. Appearance: Normal appearance. Pulmonary:      Effort: Pulmonary effort is normal. No respiratory distress. Neurological:      General: No focal deficit present. Mental Status: She is alert. Psychiatric:         Attention and Perception: Attention and perception normal.         Mood and Affect: Mood is anxious. Speech: Speech normal.         Behavior: Behavior normal.         Thought Content: Thought content normal.         Cognition and Memory: Cognition and memory normal.         Judgment: Judgment normal.                  Cris Nielsen, was evaluated through a synchronous (real-time) audio-video encounter. The patient (or guardian if applicable) is aware that this is a billable service, which includes applicable co-pays. This Virtual Visit was conducted with patient's (and/or legal guardian's) consent.  The visit was conducted pursuant to the emergency declaration under the [facility], [address] [place] [address] authority and the [employer] and [employer] General Act. Patient identification was verified, and a caregiver was present when appropriate. The patient was located at home in a state where the provider was licensed to provide care.        --Justina Epley, MD

## 2022-04-11 ENCOUNTER — HOSPITAL ENCOUNTER (OUTPATIENT)
Dept: MAMMOGRAPHY | Age: 62
Discharge: HOME | End: 2022-04-11
Payer: COMMERCIAL

## 2022-04-11 ENCOUNTER — OFFICE VISIT (OUTPATIENT)
Dept: GYNECOLOGY | Age: 62
End: 2022-04-11
Payer: COMMERCIAL

## 2022-04-11 VITALS
SYSTOLIC BLOOD PRESSURE: 134 MMHG | DIASTOLIC BLOOD PRESSURE: 80 MMHG | BODY MASS INDEX: 31.96 KG/M2 | OXYGEN SATURATION: 98 % | WEIGHT: 191.8 LBS | HEIGHT: 65 IN | HEART RATE: 84 BPM

## 2022-04-11 VITALS — WEIGHT: 193 LBS | HEIGHT: 65 IN | BODY MASS INDEX: 32.15 KG/M2

## 2022-04-11 DIAGNOSIS — Z01.419 WELL WOMAN EXAM WITH ROUTINE GYNECOLOGICAL EXAM: Primary | ICD-10-CM

## 2022-04-11 DIAGNOSIS — Z12.31 VISIT FOR SCREENING MAMMOGRAM: ICD-10-CM

## 2022-04-11 PROCEDURE — 99396 PREV VISIT EST AGE 40-64: CPT | Performed by: OBSTETRICS & GYNECOLOGY

## 2022-04-11 PROCEDURE — 77063 BREAST TOMOSYNTHESIS BI: CPT

## 2022-04-11 ASSESSMENT — ENCOUNTER SYMPTOMS
RESPIRATORY NEGATIVE: 1
EYES NEGATIVE: 1
GASTROINTESTINAL NEGATIVE: 1

## 2022-04-11 NOTE — PROGRESS NOTES
Subjective:      Patient ID: Rebecca Rolon is a 65 y.o. female. Patient is here for annual.     Gynecologic Exam        Review of Systems   Constitutional: Negative. HENT: Negative. Eyes: Negative. Respiratory: Negative. Cardiovascular: Negative. Gastrointestinal: Negative. Genitourinary: Negative. Musculoskeletal: Negative. Skin: Negative. Neurological: Negative. Psychiatric/Behavioral: Negative. Date of Birth 1960  Past Medical History:   Diagnosis Date    Allergic rhinitis     Asthma     H/O- stress related    Diabetes mellitus, type 1     Fibroid     History of blood transfusion ?  Hyperlipidemia     Menopause ovarian failure     Other acute reactions to stress     Pancreatitis     Rectal abscess     II     Past Surgical History:   Procedure Laterality Date    BACK SURGERY N/A 2020    BRONCHOSCOPY N/A 3/4/2022    NAVIGATIONAL BRONCHOSCOPY WITH MONARCH AND RADIAL PROBE. ENDOBRONCHIAL ULTRASOUND performed by Ronald Pérez MD at [address] [place] [address], TOTAL ABDOMINAL      KNEE ARTHROSCOPY Right 2014 x2    Dr. Kirby Euceda ARTHROSCOPY Left 2015    OTHER SURGICAL HISTORY  16    EUS, no interventions    OTHER SURGICAL HISTORY  16    EXAMINATION UNDER ANESTHESIA, EXCISION OF ANAL MASS    OVARY REMOVAL Bilateral     TUBAL LIGATION       OB History    Para Term  AB Living   0 0 0 0 0 0   SAB IAB Ectopic Molar Multiple Live Births   0 0 0 0 0 0     Social History     Socioeconomic History    Marital status: Single     Spouse name: Not on file    Number of children: Not on file    Years of education: Not on file    Highest education level: Not on file   Occupational History    Not on file   Tobacco Use    Smoking status: Never Smoker    Smokeless tobacco: Never Used   Vaping Use    Vaping Use: Never used   Substance and Sexual Activity    Alcohol use:  Yes Comment: socially     Drug use: No    Sexual activity: Yes     Partners: Male   Other Topics Concern    Not on file   Social History Narrative    Not on file     Social Determinants of Health     Financial Resource Strain: Low Risk     Difficulty of Paying Living Expenses: Not hard at all   Food Insecurity: No Food Insecurity    Worried About Running Out of Food in the Last Year: Never true    [address] in the Last Year: Never true   Transportation Needs:     Lack of Transportation (Medical): Not on file    Lack of Transportation (Non-Medical): Not on file   Physical Activity:     Days of Exercise per Week: Not on file    Minutes of Exercise per Session: Not on file   Stress:     Feeling of Stress : Not on file   Social Connections:     Frequency of Communication with Friends and Family: Not on file    Frequency of Social Gatherings with Friends and Family: Not on file    Attends [employer] Services: Not on file    Active Member of [address] [place] [address] or Organizations: Not on file    Attends Club or Organization Meetings: Not on file    Marital Status: Not on file   Intimate Partner Violence:     Fear of Current or Ex-Partner: Not on file    Emotionally Abused: Not on file    Physically Abused: Not on file    Sexually Abused: Not on file   Housing Stability:     Unable to Pay for Housing in the Last Year: Not on file    Number of [place] in the Last Year: Not on file    Unstable Housing in the Last Year: Not on file     No Known Allergies  Outpatient Medications Marked as Taking for the 4/11/22 encounter (Office Visit) with Anna Wasserman MD   Medication Sig Dispense Refill    Vitamins A & D (VITAMIN A & D) [address] units TABS Take by mouth in the morning and at bedtime      ALPRAZolam (XANAX) 1 MG tablet Take 1 tablet by mouth nightly as needed for Sleep for up to 90 days.  90 tablet 0    Omega-3 Fatty Acids (FISH OIL PO) Take 1 capsule by mouth in the morning, at noon, and at bedtime sounds are normal. There is no distension. Palpations: Abdomen is soft. There is no hepatomegaly or mass. Tenderness: There is no abdominal tenderness. There is no guarding or rebound. Hernia: No hernia is present. There is no hernia in the left inguinal area or right inguinal area. Genitourinary:     General: Normal vulva. Exam position: Lithotomy position. Pubic Area: No rash. Labia:         Right: No rash, tenderness, lesion or injury. Left: No rash, tenderness, lesion or injury. Urethra: No prolapse, urethral pain, urethral swelling or urethral lesion. Vagina: Normal. No signs of injury and foreign body. No vaginal discharge, erythema, tenderness or bleeding. Adnexa:         Right: No mass, tenderness or fullness. Left: No mass, tenderness or fullness. Rectum: Normal. Guaiac result negative. No mass, tenderness, anal fissure, external hemorrhoid or internal hemorrhoid. Normal anal tone. Comments: Normal urethral meatus, nl urethra, nl bladder. Musculoskeletal:         General: No tenderness. Normal range of motion. Cervical back: Normal range of motion and neck supple. No rigidity. Lymphadenopathy:      Cervical: No cervical adenopathy. Lower Body: No right inguinal adenopathy. No left inguinal adenopathy. Skin:     General: Skin is warm and dry. Findings: No erythema or rash. Neurological:      General: No focal deficit present. Mental Status: She is alert and oriented to person, place, and time. Deep Tendon Reflexes: Reflexes are normal and symmetric. Psychiatric:         Mood and Affect: Mood normal.         Behavior: Behavior normal.         Thought Content: Thought content normal.         Judgment: Judgment normal.         Assessment:      1. Annual  2. menopause      Plan:      1.  Pap, calcium, exercise, mammogram, hemocult negative  2. stable        Nirmal Redding MD

## 2022-05-17 LAB
AVERAGE GLUCOSE: NORMAL
HBA1C MFR BLD: 9.5 %

## 2022-06-28 ENCOUNTER — TELEPHONE (OUTPATIENT)
Dept: INTERNAL MEDICINE CLINIC | Age: 62
End: 2022-06-28

## 2022-06-28 NOTE — TELEPHONE ENCOUNTER
----- Message from Anjelica Pugh sent at 6/28/2022 10:52 AM EDT -----  Subject: Appointment Request    Reason for Call: Routine Existing Condition Follow Up    QUESTIONS  Type of Appointment? Established Patient  Reason for appointment request? Available appointments did not meet   patient need  Additional Information for Provider? Pt needs an appt to reup her   prescriptions. None were available until 712. She needs to be seen sooner. Please contact the pt   ---------------------------------------------------------------------------  --------------  CALL BACK INFO  What is the best way for the office to contact you? OK to leave message on   voicemail  Preferred Call Back Phone Number? [phone]  ---------------------------------------------------------------------------  --------------  SCRIPT ANSWERS  Relationship to Patient? Self  Is this follow up request related to routine Diabetes Management? No  Have you been diagnosed with COVID-19 in the past 10 days? No  (Service Expert  click yes below to proceed with Book Buyback As Usual   Scheduling)?  Yes

## 2022-07-01 ENCOUNTER — TELEMEDICINE (OUTPATIENT)
Dept: INTERNAL MEDICINE CLINIC | Age: 62
End: 2022-07-01
Payer: COMMERCIAL

## 2022-07-01 DIAGNOSIS — E11.9 TYPE 2 DIABETES MELLITUS WITHOUT COMPLICATION, WITH LONG-TERM CURRENT USE OF INSULIN (HCC): ICD-10-CM

## 2022-07-01 DIAGNOSIS — F43.23 SITUATIONAL MIXED ANXIETY AND DEPRESSIVE DISORDER: Primary | ICD-10-CM

## 2022-07-01 DIAGNOSIS — Z79.4 TYPE 2 DIABETES MELLITUS WITHOUT COMPLICATION, WITH LONG-TERM CURRENT USE OF INSULIN (HCC): ICD-10-CM

## 2022-07-01 PROCEDURE — 3046F HEMOGLOBIN A1C LEVEL >9.0%: CPT | Performed by: INTERNAL MEDICINE

## 2022-07-01 PROCEDURE — 99213 OFFICE O/P EST LOW 20 MIN: CPT | Performed by: INTERNAL MEDICINE

## 2022-07-01 PROCEDURE — 2022F DILAT RTA XM EVC RTNOPTHY: CPT | Performed by: INTERNAL MEDICINE

## 2022-07-01 PROCEDURE — G8427 DOCREV CUR MEDS BY ELIG CLIN: HCPCS | Performed by: INTERNAL MEDICINE

## 2022-07-01 PROCEDURE — 3017F COLORECTAL CA SCREEN DOC REV: CPT | Performed by: INTERNAL MEDICINE

## 2022-07-01 RX ORDER — CETIRIZINE HYDROCHLORIDE 10 MG/1
10 TABLET ORAL DAILY
COMMUNITY

## 2022-07-01 RX ORDER — ALPRAZOLAM 1 MG/1
1 TABLET ORAL NIGHTLY PRN
Qty: 90 TABLET | Refills: 0 | Status: SHIPPED | OUTPATIENT
Start: 2022-07-01 | End: 2022-09-26 | Stop reason: SDUPTHER

## 2022-07-01 NOTE — PROGRESS NOTES
Renita Springer (:  1960) is a Established patient, here for evaluation of the following:    Assessment & Plan   Below is the assessment and plan developed based on review of pertinent history, physical exam, labs, studies, and medications. 1. Situational mixed anxiety and depressive disorder  -Stable. OARRS reviewed, no concerns. We have previously discussed potential negative long-term effects of these medications, she is elected to continue it for now. Continue sertraline.  -     ALPRAZolam (XANAX) 1 MG tablet; Take 1 tablet by mouth nightly as needed for Sleep for up to 90 days. , Disp-90 tablet, R-0Normal  2. Type 2 diabetes mellitus without complication, with long-term current use of insulin (HCC)   -Not well controlled recently, seeing endocrinology. Reviewed blood work from May. Return in about 3 months (around 10/1/2022) for sleep. Subjective   HPI     Sleep is stable. She takes alprazolam every night before bed. If she forgets typically she has trouble falling asleep and then will take it. When she is taking it she has no problems with waking up in the middle of the night. Anxiety has been otherwise controlled. She denies any side effects. She is seeing her endocrinologist for the diabetes, her A1c was back up. She is working on getting that back down. She has not been as diligent with getting the weight down recently either and plans on getting back to that when she is back from her vacation. Review of Systems       Objective   Patient-Reported Vitals  Patient-Reported Systolic (Top): 328 mmHg  Patient-Reported Diastolic (Bottom): 80 mmHg  Patient-Reported Weight: 187lb  Patient-Reported Height: 5 4.5       Physical Exam  Vitals reviewed. Constitutional:       General: She is not in acute distress. Appearance: Normal appearance. She is well-developed. HENT:      Head: Normocephalic and atraumatic.    Pulmonary:      Effort: Pulmonary effort is normal. No respiratory distress. Skin:     General: Skin is warm and dry. Neurological:      Mental Status: She is alert. Psychiatric:         Behavior: Behavior normal.         Thought Content: Thought content normal.         Judgment: Judgment normal.                  Marquis Haley, was evaluated through a synchronous (real-time) audio-video encounter. The patient (or guardian if applicable) is aware that this is a billable service, which includes applicable co-pays. This Virtual Visit was conducted with patient's (and/or legal guardian's) consent. The visit was conducted pursuant to the emergency declaration under the [facility], [address] [place] [address] authority and the [employer] and [employer] General Act. Patient identification was verified, and a caregiver was present when appropriate. The patient was located at Home: [address] [place] [id number]. Provider was located at [facility] ([address] [place] [address]): [phone] [address] [place] [address], [address] [place] [address],  [address].         --Kale Adan MD

## 2022-07-20 ENCOUNTER — TELEPHONE (OUTPATIENT)
Dept: INTERNAL MEDICINE CLINIC | Age: 62
End: 2022-07-20

## 2022-07-20 NOTE — TELEPHONE ENCOUNTER
Pt ear are clogged and she can not hear. She had covid 7/1/22. She's not sure what to do.  She said the cheat congestion and sinus infection as well, its just her ear           [phone] [facility], [address] [place] [address]

## 2022-08-05 ENCOUNTER — TELEPHONE (OUTPATIENT)
Dept: INTERNAL MEDICINE CLINIC | Age: 62
End: 2022-08-05

## 2022-08-05 ENCOUNTER — HOSPITAL ENCOUNTER (OUTPATIENT)
Dept: GENERAL RADIOLOGY | Age: 62
Discharge: HOME | End: 2022-08-05
Payer: COMMERCIAL

## 2022-08-05 ENCOUNTER — OFFICE VISIT (OUTPATIENT)
Dept: INTERNAL MEDICINE CLINIC | Age: 62
End: 2022-08-05
Payer: COMMERCIAL

## 2022-08-05 VITALS
HEIGHT: 64 IN | DIASTOLIC BLOOD PRESSURE: 74 MMHG | WEIGHT: 187 LBS | SYSTOLIC BLOOD PRESSURE: 134 MMHG | BODY MASS INDEX: 31.92 KG/M2

## 2022-08-05 DIAGNOSIS — M25.561 ACUTE PAIN OF RIGHT KNEE: ICD-10-CM

## 2022-08-05 DIAGNOSIS — R09.81 SINUS CONGESTION: ICD-10-CM

## 2022-08-05 DIAGNOSIS — R42 DIZZINESS: Primary | ICD-10-CM

## 2022-08-05 DIAGNOSIS — R55 SYNCOPE, UNSPECIFIED SYNCOPE TYPE: ICD-10-CM

## 2022-08-05 PROCEDURE — 73562 X-RAY EXAM OF KNEE 3: CPT

## 2022-08-05 PROCEDURE — G8417 CALC BMI ABV UP PARAM F/U: HCPCS | Performed by: INTERNAL MEDICINE

## 2022-08-05 PROCEDURE — 99214 OFFICE O/P EST MOD 30 MIN: CPT | Performed by: INTERNAL MEDICINE

## 2022-08-05 PROCEDURE — 93000 ELECTROCARDIOGRAM COMPLETE: CPT | Performed by: INTERNAL MEDICINE

## 2022-08-05 PROCEDURE — 1036F TOBACCO NON-USER: CPT | Performed by: INTERNAL MEDICINE

## 2022-08-05 PROCEDURE — G8427 DOCREV CUR MEDS BY ELIG CLIN: HCPCS | Performed by: INTERNAL MEDICINE

## 2022-08-05 PROCEDURE — 3017F COLORECTAL CA SCREEN DOC REV: CPT | Performed by: INTERNAL MEDICINE

## 2022-08-05 RX ORDER — AZELASTINE 1 MG/ML
1 SPRAY, METERED NASAL 2 TIMES DAILY
Qty: 30 ML | Refills: 1 | Status: SHIPPED | OUTPATIENT
Start: 2022-08-05 | End: 2022-09-27

## 2022-08-05 NOTE — PROGRESS NOTES
Tammy Pozo (:  1960) is a 65 y.o. female,Established patient, here for evaluation of the following chief complaint(s):  Dizziness and Ear Fullness (Both ears feel clogged, had covid  and thinks this is all from that. Saturday she passed out while laying in bed landed on floor and head hurt, went to restroom and then she notices she hit the wall and went down the wall )         ASSESSMENT/PLAN:  1. Dizziness  -I am not sure whether the dizziness that she has occasionally is really related to the episode on Saturday night, the 1 on Saturday night sounds more like syncope. Could have been related to getting up in the middle of the night, but particularly given her recent COVID infection, I think that requires more of a cardiac evaluation, particularly heart monitor to rule out significant arrhythmia. EKG done in clinic was normal, some low voltages but no arrhythmia noted. Defer echo since she will be seeing cardiology. -     EKG 12 Carin Kat MD, Cardiology, [facility]  2. Syncope, unspecified syncope type  -     Casandra Garcia MD, Cardiology, [facility]  3. Acute pain of right knee  -She has some medial tenderness, check x-ray. If no acute abnormality is noted there, she will follow-up with her orthopedist  -     XR KNEE RIGHT (3 VIEWS); Future  4. Sinus congestion   -Add azelastine nasal spray    Return in about 2 months (around 10/5/2022), or if symptoms worsen or fail to improve. Subjective   SUBJECTIVE/OBJECTIVE:  HPI    She had COVID a month ago and has not felt entirely well since. She had more severe illnesses than her family members did, sinus congestion was more significant. She still has sinus congestion and ear fullness, drainage is clear. Her ears feel like they need to pop. She is taking the Flonase. No pain.   She has intermittent episodes where she will feel dizzy when she is standing for longer periods of time like in the kitchen but did not really think anything of it. On Saturday night she got up from bed to go to the bathroom and does not remember falling, but she woke up on the floor and found that she must have hit her head and her knee. She has an abrasion on the right forehead and her right knee has been painful and swollen. She has a history of problems in the right knee, had a couple of surgeries due to meniscal tear. She had 1 episode of palpitations at rest that was very brief and she did not think anything of it. Review of Systems       Objective   Physical Exam  Vitals reviewed. Constitutional:       General: She is not in acute distress. Appearance: Normal appearance. HENT:      Head: Normocephalic and atraumatic. Right Ear: Tympanic membrane, ear canal and external ear normal.      Left Ear: Tympanic membrane, ear canal and external ear normal.   Cardiovascular:      Rate and Rhythm: Normal rate and regular rhythm. Heart sounds: Normal heart sounds. Pulmonary:      Effort: Pulmonary effort is normal. No respiratory distress. Breath sounds: Normal breath sounds. Musculoskeletal:         General: Swelling (Right knee, particularly medially. No erythema or warmth) and tenderness (Medial right knee, more distally) present. No deformity. Normal range of motion. Neurological:      Mental Status: She is alert. An electronic signature was used to authenticate this note.     --Chris Saleem MD

## 2022-08-05 NOTE — TELEPHONE ENCOUNTER
Will need to see her in the office - can she come in today at 3:20?  Or I can get her in sometime next week

## 2022-08-05 NOTE — TELEPHONE ENCOUNTER
Still having lingering Covid symptoms. Tested positive 7/1/22. Can't hear thinks ears need to popped. Saturday night got up in the middle of the night fell forward and hit her head on the wall. She hit her knees. She lose consciousness. Feels light headed some times before the fall when she stands up. Right knee is hurting very bad. She is still able to walk on it.

## 2022-08-08 ENCOUNTER — TELEPHONE (OUTPATIENT)
Dept: INTERNAL MEDICINE CLINIC | Age: 62
End: 2022-08-08

## 2022-08-30 ENCOUNTER — OFFICE VISIT (OUTPATIENT)
Dept: CARDIOLOGY CLINIC | Age: 62
End: 2022-08-30
Payer: COMMERCIAL

## 2022-08-30 VITALS
SYSTOLIC BLOOD PRESSURE: 162 MMHG | DIASTOLIC BLOOD PRESSURE: 90 MMHG | BODY MASS INDEX: 32.61 KG/M2 | WEIGHT: 191 LBS | HEART RATE: 101 BPM | HEIGHT: 64 IN

## 2022-08-30 DIAGNOSIS — R55 SYNCOPE, UNSPECIFIED SYNCOPE TYPE: Primary | ICD-10-CM

## 2022-08-30 DIAGNOSIS — R42 DIZZINESS: ICD-10-CM

## 2022-08-30 PROCEDURE — 93000 ELECTROCARDIOGRAM COMPLETE: CPT | Performed by: INTERNAL MEDICINE

## 2022-08-30 PROCEDURE — 99244 OFF/OP CNSLTJ NEW/EST MOD 40: CPT | Performed by: INTERNAL MEDICINE

## 2022-08-30 PROCEDURE — G8427 DOCREV CUR MEDS BY ELIG CLIN: HCPCS | Performed by: INTERNAL MEDICINE

## 2022-08-30 PROCEDURE — G8417 CALC BMI ABV UP PARAM F/U: HCPCS | Performed by: INTERNAL MEDICINE

## 2022-08-30 PROCEDURE — 93246 EXT ECG>7D<15D RECORDING: CPT | Performed by: INTERNAL MEDICINE

## 2022-08-30 RX ORDER — MELOXICAM 5 MG/1
CAPSULE ORAL DAILY
COMMUNITY
End: 2022-09-26 | Stop reason: ALTCHOICE

## 2022-08-30 NOTE — PROGRESS NOTES
07/29/2020); Hysterectomy, total abdominal; Tubal ligation; and bronchoscopy (N/A, 3/4/2022). Social History:   reports that she has never smoked. She has never used smokeless tobacco. She reports current alcohol use. She reports that she does not use drugs. Family History:  family history includes Diabetes in her father; Heart Disease in her mother. Home Medications:  Prior to Admission medications    Medication Sig Start Date End Date Taking? Authorizing Provider   Meloxicam 5 MG CAPS Take by mouth daily   Yes Historical Provider, MD   azelastine (ASTELIN) 0.1 % nasal spray 1 spray by Nasal route in the morning and 1 spray before bedtime. Use in each nostril as directed. 8/5/22  Yes Cony Gutierrez MD   cetirizine (ZYRTEC) 10 MG tablet Take 10 mg by mouth daily   Yes Historical Provider, MD   ALPRAZolam Leilani Najera) 1 MG tablet Take 1 tablet by mouth nightly as needed for Sleep for up to 90 days.  7/1/22 9/29/22 Yes Cony Gutierrez MD   Vitamins A & D (VITAMIN A & D) [address] units TABS Take by mouth in the morning and at bedtime   Yes Historical Provider, MD   Omega-3 Fatty Acids (FISH OIL PO) Take 1 capsule by mouth in the morning, at noon, and at bedtime    Yes Historical Provider, MD   Cyanocobalamin (B-12 PO) Take by mouth daily    Yes Historical Provider, MD   ramipril (ALTACE) 10 MG capsule TAKE 1 CAPSULE DAILY FOR BLOOD PRESSURE 11/22/21  Yes Cony Gutierrez MD   sertraline (ZOLOFT) 50 MG tablet TAKE 1 TABLET DAILY FOR MOOD 11/22/21  Yes Cony Gutierrez MD   atorvastatin (LIPITOR) 20 MG tablet TAKE 1 TABLET DAILY 8/11/21  Yes Chelsea Cooper MD   metFORMIN (GLUCOPHAGE) 850 MG tablet TAKE 1 TABLET TWICE A DAY WITH MEALS 8/11/21  Yes Chelesa Cooper MD   gemfibrozil (LOPID) 600 MG tablet TAKE 1 TABLET TWICE A DAY FOR TRIGLYCERIDES 8/11/21  Yes Chelsea Cooper MD   insulin glargine (BASAGLAR KWIKPEN) 100 UNIT/ML injection pen Inject 15 Units into the skin nightly 6/14/21  Yes Chelsea Cooper MD Glucosamine-Chondroitin (GLUCOSAMINE CHONDR COMPLEX PO) Take by mouth daily   Yes Historical Provider, MD           Allergies:  Patient has no known allergies. Review of Systems:   Constitutional: there has been no unanticipated weight loss. Eyes: No visual changes or diplopia. No scleral icterus. ENT: No Headaches, hearing loss or vertigo. No mouth sores or sore throat. Cardiovascular: Reviewed in HPI   Pulmonary:  no cough or sputum production. Gastrointestinal: No abdominal pain, appetite loss, blood in stools. No change in bowel or bladder habits. Genitourinary: No dysuria, trouble voiding, or hematuria. Musculoskeletal:  No gait disturbance, weakness or joint complaints. Integumentary: No rash or pruritis. Endocrine: No malaise, fatigue or temperature intolerance. Hematologic/Lymphatic: No abnormal bruising or bleeding, blood clots or swollen lymph nodes. Allergic/Immunologic: No nasal congestion or hives. All other ROS are reviewed and are unremarkable. Physical Examination:      Wt Readings from Last 3 Encounters:   08/30/22 191 lb (86.6 kg)   08/05/22 187 lb (84.8 kg)   04/11/22 193 lb (87.5 kg)       BP Readings from Last 3 Encounters:   08/30/22 (!) 162/90   08/05/22 134/74   04/11/22 134/80       Pulse Readings from Last 3 Encounters:   08/30/22 (!) 101   04/11/22 84   03/17/22 91       Constitutional and General Appearance:  Healthy. And alert . HEENT: eyes and ears intact. No nasal masses  THYROID: not enlarged  LUNGS:  Clear to auscultation and percussion  HEART and VASCULAR:  The apical impulses not displaced  Heart tones are crisp and normal  Cervical veins are not engorged  The carotid upstroke is normal in amplitude and contour without delay or bruit  Peripheral pulses are symmetrical and full  There is no clubbing, cyanosis of the extremities.   No peripheral edema  Femoral Arteries: 2+ and equal  Pedal Pulses: 2+ and equal   ABDOMEN[de-identified]  No masses or tenderness  Liver/Spleen: No Abnormalities Noted  NEUROLOGICAL:  . Moves all extremities to command. Cranial nerves 2-12 are in tact. PSYCHIATRIC: alert and lucid  and oriented and appropriate  SKIN: No lesions or rashes  LYMPH NODES: none enlarged            CBC with Differential:    Lab Results   Component Value Date/Time    WBC 8.2 02/12/2021 07:05 AM    RBC 3.91 02/12/2021 07:05 AM    HGB 12.1 02/12/2021 07:05 AM    HCT 37.4 02/12/2021 07:05 AM     02/12/2021 07:05 AM    MCV 95.7 02/12/2021 07:05 AM    MCH 31.0 02/12/2021 07:05 AM    MCHC 32.4 02/12/2021 07:05 AM    RDW 12.1 02/12/2021 07:05 AM    LYMPHOPCT 22.7 02/12/2021 07:05 AM    MONOPCT 3.8 02/12/2021 07:05 AM    BASOPCT 0.4 02/12/2021 07:05 AM    MONOSABS 0.3 02/12/2021 07:05 AM    LYMPHSABS 1.9 02/12/2021 07:05 AM    EOSABS 0.2 02/12/2021 07:05 AM    BASOSABS 0.0 02/12/2021 07:05 AM     BMP:    Lab Results   Component Value Date/Time     08/09/2021 09:42 AM    K 4.2 08/09/2021 09:42 AM     08/09/2021 09:42 AM    CO2 27 08/09/2021 09:42 AM    BUN 13 08/09/2021 09:42 AM    LABALBU 4.5 08/09/2021 09:42 AM    CREATININE 0.53 08/09/2021 09:42 AM    CALCIUM 10.0 08/09/2021 09:42 AM    GFRAA >60 08/05/2021 06:55 AM    LABGLOM >60 08/05/2021 06:55 AM     Uric Acid:  No components found for: URIC  PT/INR:    Lab Results   Component Value Date/Time    PROTIME 11.3 07/24/2020 07:03 AM    INR 0.97 07/24/2020 07:03 AM     Last 3 Troponin:    Lab Results   Component Value Date/Time    TROPONINI <0.01 03/12/2018 06:37 AM     FLP:    Lab Results   Component Value Date/Time    TRIG 63 02/12/2021 07:05 AM    HDL 69 02/12/2021 07:05 AM    LDLCALC 69 02/12/2021 07:05 AM    LABVLDL 13 02/12/2021 07:05 AM       EKG: On 8/30/2022 sinus rhythm at 96/min. Low voltage in precordial leads. [place] echocardiogram  This patient was educated using the patient point room wall mount device.  Absence from smokers and smoking and diet and exercising are important. Assessment/ Plan   . Syncope, unspecified syncope type  Episode may have been a syncopal episode. I find nothing on examination that would suggest an etiology. EKG shows sinus rhythm at 96/min. Plan to obtain carotid Dopplers on her. Echocardiogram.  We will have her wear a monitor for 2 weeks and may need a loop recorder. Need to watch her blood pressure as it was high when she came in and may be normal most of the time otherwise. - EKG 12 Lead    Please cc this note to Dr. William Nickerson  Thanks for allowing us the opportunity  to participate in the evaluation and care of your patients.  Please call if we may assist further [phone]    This note was likely completed using voice recognition technology and may contain unintended errors  Ayanna Kuhn M.D., [facility]  7/79/192524:99 AM

## 2022-09-13 ENCOUNTER — HOSPITAL ENCOUNTER (OUTPATIENT)
Dept: CARDIOLOGY | Age: 62
Discharge: HOME | End: 2022-09-13
Payer: COMMERCIAL

## 2022-09-13 LAB
LV EF: 65 %
LVEF MODALITY: NORMAL

## 2022-09-13 PROCEDURE — 93306 TTE W/DOPPLER COMPLETE: CPT

## 2022-09-17 ENCOUNTER — HOSPITAL ENCOUNTER (OUTPATIENT)
Dept: CT IMAGING | Age: 62
Discharge: HOME | End: 2022-09-17
Payer: COMMERCIAL

## 2022-09-17 DIAGNOSIS — R91.1 PULMONARY NODULE: ICD-10-CM

## 2022-09-17 PROCEDURE — 71250 CT THORAX DX C-: CPT

## 2022-09-21 ENCOUNTER — TELEPHONE (OUTPATIENT)
Dept: INTERNAL MEDICINE CLINIC | Age: 62
End: 2022-09-21

## 2022-09-21 NOTE — TELEPHONE ENCOUNTER
Patient called, Mark Luna called her to schedule a pet scan ordered by Dr. Tisha Martinez. She is not aware of Dr. Tisha Martinez requesting this. Please call and advise if Dr. Tisha Martinez does in fact want her to have a pet scan.

## 2022-09-21 NOTE — TELEPHONE ENCOUNTER
I have no idea why they are contacting her now, there was an order almost a year ago before she saw Dr. Radha Riddle but we ended up not pursuing it since she was going to see Dr. Radha Riddle - no need for this study at this point

## 2022-09-26 ENCOUNTER — TELEMEDICINE (OUTPATIENT)
Dept: INTERNAL MEDICINE CLINIC | Age: 62
End: 2022-09-26
Payer: COMMERCIAL

## 2022-09-26 DIAGNOSIS — F43.23 SITUATIONAL MIXED ANXIETY AND DEPRESSIVE DISORDER: ICD-10-CM

## 2022-09-26 PROCEDURE — 3017F COLORECTAL CA SCREEN DOC REV: CPT | Performed by: INTERNAL MEDICINE

## 2022-09-26 PROCEDURE — G8427 DOCREV CUR MEDS BY ELIG CLIN: HCPCS | Performed by: INTERNAL MEDICINE

## 2022-09-26 PROCEDURE — 99213 OFFICE O/P EST LOW 20 MIN: CPT | Performed by: INTERNAL MEDICINE

## 2022-09-26 RX ORDER — ALPRAZOLAM 1 MG/1
1 TABLET ORAL NIGHTLY PRN
Qty: 90 TABLET | Refills: 0 | Status: SHIPPED | OUTPATIENT
Start: 2022-09-26 | End: 2022-12-25

## 2022-09-26 ASSESSMENT — PATIENT HEALTH QUESTIONNAIRE - PHQ9
7. TROUBLE CONCENTRATING ON THINGS, SUCH AS READING THE NEWSPAPER OR WATCHING TELEVISION: 0
SUM OF ALL RESPONSES TO PHQ QUESTIONS 1-9: 0
6. FEELING BAD ABOUT YOURSELF - OR THAT YOU ARE A FAILURE OR HAVE LET YOURSELF OR YOUR FAMILY DOWN: 0
5. POOR APPETITE OR OVEREATING: 0
SUM OF ALL RESPONSES TO PHQ QUESTIONS 1-9: 0
2. FEELING DOWN, DEPRESSED OR HOPELESS: 0
SUM OF ALL RESPONSES TO PHQ9 QUESTIONS 1 & 2: 0
SUM OF ALL RESPONSES TO PHQ QUESTIONS 1-9: 0
SUM OF ALL RESPONSES TO PHQ QUESTIONS 1-9: 0
4. FEELING TIRED OR HAVING LITTLE ENERGY: 0
8. MOVING OR SPEAKING SO SLOWLY THAT OTHER PEOPLE COULD HAVE NOTICED. OR THE OPPOSITE, BEING SO FIGETY OR RESTLESS THAT YOU HAVE BEEN MOVING AROUND A LOT MORE THAN USUAL: 0
3. TROUBLE FALLING OR STAYING ASLEEP: 0
10. IF YOU CHECKED OFF ANY PROBLEMS, HOW DIFFICULT HAVE THESE PROBLEMS MADE IT FOR YOU TO DO YOUR WORK, TAKE CARE OF THINGS AT HOME, OR GET ALONG WITH OTHER PEOPLE: 0
9. THOUGHTS THAT YOU WOULD BE BETTER OFF DEAD, OR OF HURTING YOURSELF: 0
1. LITTLE INTEREST OR PLEASURE IN DOING THINGS: 0

## 2022-09-26 NOTE — PROGRESS NOTES
Samantha Haider (:  1960) is a Established patient, here for evaluation of the following:    Assessment & Plan   Below is the assessment and plan developed based on review of pertinent history, physical exam, labs, studies, and medications. 1. Situational mixed anxiety and depressive disorder  -Currently using alprazolam just at night. Symptoms stable. OARRS reviewed, consistent with use. -     ALPRAZolam (XANAX) 1 MG tablet; Take 1 tablet by mouth nightly as needed for Sleep for up to 90 days. , Disp-90 tablet, R-0Normal  No follow-ups on file. Subjective   HPI    Anxiety/insomnia -she has the alprazolam at night for sleep. Denies any side effects, no morning grogginess, confusion. Review of Systems       Objective   Patient-Reported Vitals  Patient-Reported Weight: 182.6 lbs  Patient-Reported Height: 5 4       Physical Exam  Vitals reviewed. Constitutional:       General: She is not in acute distress. Appearance: Normal appearance. She is well-developed. HENT:      Head: Normocephalic and atraumatic. Pulmonary:      Effort: Pulmonary effort is normal. No respiratory distress. Neurological:      General: No focal deficit present. Mental Status: She is alert. Psychiatric:         Mood and Affect: Mood is anxious. Behavior: Behavior normal.         Thought Content: Thought content normal.         Judgment: Judgment normal.                Samantha Haider, was evaluated through a synchronous (real-time) audio-video encounter. The patient (or guardian if applicable) is aware that this is a billable service, which includes applicable co-pays. This Virtual Visit was conducted with patient's (and/or legal guardian's) consent. The visit was conducted pursuant to the emergency declaration under the [facility], [address] [place] [address] authority and the [employer] General Act.   Patient identification was verified, and a caregiver was present when appropriate. The patient was located at Home: [address] [place] [id number].    Provider was located at Home ([address]): Elizabeth Cole MD

## 2022-09-27 ENCOUNTER — OFFICE VISIT (OUTPATIENT)
Dept: PULMONOLOGY | Age: 62
End: 2022-09-27
Payer: COMMERCIAL

## 2022-09-27 VITALS
OXYGEN SATURATION: 98 % | WEIGHT: 188 LBS | SYSTOLIC BLOOD PRESSURE: 128 MMHG | DIASTOLIC BLOOD PRESSURE: 86 MMHG | HEIGHT: 64 IN | HEART RATE: 88 BPM | TEMPERATURE: 97 F | BODY MASS INDEX: 32.1 KG/M2

## 2022-09-27 DIAGNOSIS — R91.1 PULMONARY NODULE 1 CM OR GREATER IN DIAMETER: Primary | ICD-10-CM

## 2022-09-27 PROCEDURE — G8427 DOCREV CUR MEDS BY ELIG CLIN: HCPCS | Performed by: INTERNAL MEDICINE

## 2022-09-27 PROCEDURE — 3017F COLORECTAL CA SCREEN DOC REV: CPT | Performed by: INTERNAL MEDICINE

## 2022-09-27 PROCEDURE — G8417 CALC BMI ABV UP PARAM F/U: HCPCS | Performed by: INTERNAL MEDICINE

## 2022-09-27 PROCEDURE — 99212 OFFICE O/P EST SF 10 MIN: CPT | Performed by: INTERNAL MEDICINE

## 2022-09-27 PROCEDURE — 1036F TOBACCO NON-USER: CPT | Performed by: INTERNAL MEDICINE

## 2022-09-27 NOTE — PROGRESS NOTES
[facility]    Outpatient follow-up note    Subjective:   Referring Physician: Bharati Pittman MD  [address] [facility]:     The patient is 65 y.o. female who presents today for a follow-up visit for abnormal CT chest. No new complaints. Since last visit she did have an episode of syncope while trying to get out of bed and broke her knee cap. Initial visit: Patient is a never smoker who had a lesion in her lungs come to medical attention after a series of falls. Patient was vacationing with friends in May 2020 when she slipped and fell and fractured her vertebrae. She had to have spine stabilization surgery and while she was rehabbing from that she fell again on her backside and repeat imaging was performed including of the chest.  At that time her chest CT showed multiple sub-5 cm pulmonary nodules but 1 nodule in the superior segment of her right lower lobe that was roughly 2 cm in size. It was thought to potentially be infectious so she was treated with antibiotics and a repeat scan was done in December 2020. There was no significant change. A further follow-up scan was performed in October of this year and the lesion persisted. A PET scan was ordered but was refused by her insurance company and she was referred to me for further evaluation. Patient has no complaints of shortness of breath or cough but is concerned as to what this thing is and why it is there. Past Medical History:    Past Medical History:   Diagnosis Date    Allergic rhinitis     Asthma     H/O- stress related    Diabetes mellitus, type 1     Fibroid     History of blood transfusion 2001?     Hyperlipidemia     Menopause ovarian failure     Other acute reactions to stress     Pancreatitis 2000    Rectal abscess     II       Social History:    Social History     Tobacco Use   Smoking Status Never   Smokeless Tobacco Never       Family History:  Family History   Problem Relation Age of Onset    Heart Disease Mother     Diabetes Father      Current Medications:  Current Outpatient Medications on File Prior to Visit   Medication Sig Dispense Refill    ALPRAZolam (XANAX) 1 MG tablet Take 1 tablet by mouth nightly as needed for Sleep for up to 90 days. 90 tablet 0    cetirizine (ZYRTEC) 10 MG tablet Take 10 mg by mouth daily      Vitamins A & D (VITAMIN A & D) [address] units TABS Take by mouth in the morning and at bedtime      Omega-3 Fatty Acids (FISH OIL PO) Take 1 capsule by mouth in the morning, at noon, and at bedtime       Cyanocobalamin (B-12 PO) Take by mouth daily       ramipril (ALTACE) 10 MG capsule TAKE 1 CAPSULE DAILY FOR BLOOD PRESSURE 90 capsule 3    sertraline (ZOLOFT) 50 MG tablet TAKE 1 TABLET DAILY FOR MOOD 90 tablet 3    atorvastatin (LIPITOR) 20 MG tablet TAKE 1 TABLET DAILY 90 tablet 1    metFORMIN (GLUCOPHAGE) 850 MG tablet TAKE 1 TABLET TWICE A DAY WITH MEALS 180 tablet 1    gemfibrozil (LOPID) 600 MG tablet TAKE 1 TABLET TWICE A DAY FOR TRIGLYCERIDES 180 tablet 1    insulin glargine (BASAGLAR KWIKPEN) 100 UNIT/ML injection pen Inject 15 Units into the skin nightly 5 pen 3    Glucosamine-Chondroitin (GLUCOSAMINE CHONDR COMPLEX PO) Take by mouth daily       No current facility-administered medications on file prior to visit.        Allergies:  No Known Allergies      REVIEW OF SYSTEMS:    CONSTITUTIONAL: Negative for fevers and chills  HEENT: Negative for oropharyngeal exudate, post nasal drip, sinus pain / pressure, nasal congestion, ear pain  RESPIRATORY:  See HPI  CARDIOVASCULAR: Negative for chest pain, palpitations, edema  GASTROINTESTINAL: Negative for nausea, vomiting, diarrhea, constipation and abdominal pain  HEMATOLOGICAL: Negative for adenopathy  SKIN: Negative for clubbing, cyanosis, skin lesions  EXTREMITIES: Negative for weakness, decreased ROM  NEUROLOGICAL: Negative for unilateral weakness, speech or gait abnormalities  PSYCH: Negative for anxiety, depression    Objective: PHYSICAL EXAM:        VITALS:  /86 (Site: Left Upper Arm, Position: Sitting, Cuff Size: Medium Adult)   Pulse 88   Temp 97 °F (36.1 °C) (Infrared)   Ht 5' 4\" (1.626 m)   Wt 188 lb (85.3 kg)   SpO2 98%   BMI 32.27 kg/m²     CONSTITUTIONAL:  Awake, alert, cooperative, no apparent distress, and appears stated age  HEENT: No oropharyngeal exudate, PERRL, no cervical adenopathy, no tracheal deviation, thyroid size normal  LUNGS:  No increased work of breathing and clear to auscultation, no crackles or wheezing   CARDIOVASCULAR:  normal S1 and S2 and no JVD  ABDOMEN:  Normal bowel sounds, non-distended and non-tender to palpation  EXT: No edema, no calf tenderness. Pulses are present bilaterally. NEUROLOGIC:  Mental Status Exam:  Level of Alertness:   awake  Orientation:   person, place, time. SKIN:  normal skin color, texture, turgor, no redness, warmth, or swelling     DATA:      Radiology Review:  Pertinent images / reports were reviewed as a part of this visit. CT chest reveals the following:    10/2021:  Impression   No change in dominant subpleural nodular area of consolidation in the right   lower lobe, with dominant subcarinal lymph node. Given its persistence and   lack of resolution, consider PET-CT.        Last PFTs: None on file    Immunizations:   Immunization History   Administered Date(s) Administered    COVID-19, PFIZER PURPLE top, DILUTE for use, (age 15 y+), 30mcg/0.3mL 03/12/2021, 04/02/2021, 10/14/2021    Influenza Virus Vaccine 10/07/2014, 10/06/2016, 10/06/2016, 10/31/2018, 10/31/2018, 10/14/2021    Influenza Whole 10/07/2014, 10/08/2015    Influenza, AFLURIA (age 1 yrs+), FLUZONE, (age 10 mo+), MDV, 0.5mL 10/06/2016, 10/31/2018    Influenza, FLUARIX, FLULAVAL, FLUZONE (age 10 mo+) AND AFLURIA, (age 1 y+), PF, 0.5mL 09/24/2019    Influenza, FLUBLOK, (age 25 y+), PF, 0.5mL 09/24/2019, 10/18/2020    Influenza, Intradermal, Preservative free 10/04/2017    Pneumococcal Polysaccharide ([id number]) 10/31/2018    Td (Adult), 5 Lf Tetanus Toxoid, Pf (Tenivac, Decavac) 10/01/2011    Td, (Adult) Not Adsorbed 02/19/2010    Td, unspecified formulation 10/01/2011    Tdap (Boostrix, Adacel) 02/19/2010, 10/06/2016    Zoster Recombinant (Shingrix) 09/24/2019, 01/09/2020       Assessment: This is a 65 y.o. female with 2 cm right lower lobe pulmonary nodule     Plan:   -Pathology consistent with acute on chronic inflammation, not malignancy, which is re-assuring. Size justified the biopsy. Repeat CT last week was reviewed by me and the lesion which is non specific remains stable. It has been stable for 2 years. Because of it's size I discussed the option of continuing to follow versus not. She has a family history of some lung cancers that we decided that continued annual surveillance was warranted. Diagnosis Orders   1. Pulmonary nodule 1 cm or greater in diameter  CT CHEST WO CONTRAST           - Tobacco use: The patient is a never smoker    - RTC 12 months w/ MD to go over biopsy results.

## 2022-10-10 ENCOUNTER — OFFICE VISIT (OUTPATIENT)
Dept: CARDIOLOGY CLINIC | Age: 62
End: 2022-10-10
Payer: COMMERCIAL

## 2022-10-10 VITALS
BODY MASS INDEX: 32.1 KG/M2 | DIASTOLIC BLOOD PRESSURE: 78 MMHG | HEART RATE: 84 BPM | WEIGHT: 188 LBS | SYSTOLIC BLOOD PRESSURE: 130 MMHG | HEIGHT: 64 IN

## 2022-10-10 DIAGNOSIS — R55 SYNCOPE, UNSPECIFIED SYNCOPE TYPE: Primary | ICD-10-CM

## 2022-10-10 DIAGNOSIS — E78.5 HYPERLIPIDEMIA LDL GOAL <70: Primary | ICD-10-CM

## 2022-10-10 PROCEDURE — 93248 EXT ECG>7D<15D REV&INTERPJ: CPT | Performed by: INTERNAL MEDICINE

## 2022-10-10 PROCEDURE — 1036F TOBACCO NON-USER: CPT | Performed by: INTERNAL MEDICINE

## 2022-10-10 PROCEDURE — G8484 FLU IMMUNIZE NO ADMIN: HCPCS | Performed by: INTERNAL MEDICINE

## 2022-10-10 PROCEDURE — G8417 CALC BMI ABV UP PARAM F/U: HCPCS | Performed by: INTERNAL MEDICINE

## 2022-10-10 PROCEDURE — G8427 DOCREV CUR MEDS BY ELIG CLIN: HCPCS | Performed by: INTERNAL MEDICINE

## 2022-10-10 PROCEDURE — 3017F COLORECTAL CA SCREEN DOC REV: CPT | Performed by: INTERNAL MEDICINE

## 2022-10-10 PROCEDURE — 99214 OFFICE O/P EST MOD 30 MIN: CPT | Performed by: INTERNAL MEDICINE

## 2022-10-10 NOTE — PROGRESS NOTES
[facility] [place] [id number]     Outpatient Cardiology Consult  Consulting Cardiologist Gus Rosario MD, M.D., [facility] [place]  Referring Provider:  Telly Sherman MD    10/10/2022,9:47 AM    Chief Complaint   Patient presents with    Results     Testing Results           Asked by No admitting provider for patient encounter./Beverley Candelaria MD  to evaluate and assess this patient's syncope    History of Present Illness: Daljit Riley is a 65 y.o. female here for evaluation of her cardiovascular status as to a probable recent syncope. She initially stated that she \"fell out of bed and injured her right knee. On further questioning it sounds as if she was trying to get out to go to the bathroom and then went down on her knees. Did have apparently a fracture that was identified a week later. Had a small abrasion on the forehead. This was the first occasion for this episode although she has had some orthostasis in the last 2 or 3 years. Today I found her blood pressure be high at 168/98 and repeated at 160/90. She states that it is always normal elsewhere. There is no known coronary or heart disease and she or her family. The examination today is unremarkable. Her lungs are clear the heart is regular there are no gallops or rubs. There are no bruits in her neck. We will evaluate possible cardiac source for her issues. Social he does not smoke and never has. Past Medical History:   has a past medical history of Allergic rhinitis, Asthma, Diabetes mellitus, type 1, Fibroid, History of blood transfusion, Hyperlipidemia, Menopause ovarian failure, Other acute reactions to stress, Pancreatitis, and Rectal abscess. Surgical History:   has a past surgical history that includes Hysterectomy; Knee arthroscopy (Right, 2014 x2); Knee arthroscopy (Left, 2015); Ovary removal (Bilateral);  Colonoscopy; other surgical history (6/1/16); other surgical history (6/2/16); back surgery (N/A, 07/29/2020); Hysterectomy, total abdominal; Tubal ligation; and bronchoscopy (N/A, 3/4/2022). Social History:   reports that she has never smoked. She has never used smokeless tobacco. She reports current alcohol use. She reports that she does not use drugs. Family History:  family history includes Diabetes in her father; Heart Disease in her mother. Home Medications:  Prior to Admission medications    Medication Sig Start Date End Date Taking? Authorizing Provider   ALPRAZolam Joanne Glasgow) 1 MG tablet Take 1 tablet by mouth nightly as needed for Sleep for up to 90 days. 9/26/22 12/25/22 Yes Pippa Nevarez MD   cetirizine (ZYRTEC) 10 MG tablet Take 10 mg by mouth daily   Yes Historical Provider, MD   Vitamins A & D (VITAMIN A & D) [address] units TABS Take by mouth in the morning and at bedtime   Yes Historical Provider, MD   Omega-3 Fatty Acids (FISH OIL PO) Take 1 capsule by mouth in the morning, at noon, and at bedtime    Yes Historical Provider, MD   Cyanocobalamin (B-12 PO) Take by mouth daily    Yes Historical Provider, MD   ramipril (ALTACE) 10 MG capsule TAKE 1 CAPSULE DAILY FOR BLOOD PRESSURE 11/22/21  Yes Pippa Nevarez MD   sertraline (ZOLOFT) 50 MG tablet TAKE 1 TABLET DAILY FOR MOOD 11/22/21  Yes Pippa Nevarez MD   atorvastatin (LIPITOR) 20 MG tablet TAKE 1 TABLET DAILY 8/11/21  Yes Marlene Joseph MD   metFORMIN (GLUCOPHAGE) 850 MG tablet TAKE 1 TABLET TWICE A DAY WITH MEALS 8/11/21  Yes Marlene Joseph MD   gemfibrozil (LOPID) 600 MG tablet TAKE 1 TABLET TWICE A DAY FOR TRIGLYCERIDES 8/11/21  Yes Marlene Joseph MD   insulin glargine (BASAGLAR KWIKPEN) 100 UNIT/ML injection pen Inject 15 Units into the skin nightly 6/14/21  Yes Marlene Joseph MD   Glucosamine-Chondroitin (GLUCOSAMINE CHONDR COMPLEX PO) Take by mouth daily   Yes Historical Provider, MD           Allergies:  Patient has no known allergies. Review of Systems:   Constitutional: there has been no unanticipated weight loss.    Eyes: No visual changes or diplopia. No scleral icterus. ENT: No Headaches, hearing loss or vertigo. No mouth sores or sore throat. Cardiovascular: Reviewed in HPI   Pulmonary:  no cough or sputum production. Gastrointestinal: No abdominal pain, appetite loss, blood in stools. No change in bowel or bladder habits. Genitourinary: No dysuria, trouble voiding, or hematuria. Musculoskeletal:  No gait disturbance, weakness or joint complaints. Integumentary: No rash or pruritis. Endocrine: No malaise, fatigue or temperature intolerance. Hematologic/Lymphatic: No abnormal bruising or bleeding, blood clots or swollen lymph nodes. Allergic/Immunologic: No nasal congestion or hives. All other ROS are reviewed and are unremarkable. Physical Examination:      Wt Readings from Last 3 Encounters:   10/10/22 188 lb (85.3 kg)   09/27/22 188 lb (85.3 kg)   08/30/22 191 lb (86.6 kg)       BP Readings from Last 3 Encounters:   10/10/22 130/78   09/27/22 128/86   08/30/22 (!) 162/90       Pulse Readings from Last 3 Encounters:   10/10/22 84   09/27/22 88   08/30/22 (!) 101       Constitutional and General Appearance:  Healthy. And alert . HEENT: eyes and ears intact. No nasal masses  THYROID: not enlarged  LUNGS:  Clear to auscultation and percussion  HEART and VASCULAR:  The apical impulses not displaced  Heart tones are crisp and normal  Cervical veins are not engorged  The carotid upstroke is normal in amplitude and contour without delay or bruit  Peripheral pulses are symmetrical and full  There is no clubbing, cyanosis of the extremities. No peripheral edema  Femoral Arteries: 2+ and equal  Pedal Pulses: 2+ and equal   ABDOMEN[de-identified]  No masses or tenderness  Liver/Spleen: No Abnormalities Noted  NEUROLOGICAL:  . Moves all extremities to command. Cranial nerves 2-12 are in tact.   PSYCHIATRIC: alert and lucid  and oriented and appropriate  SKIN: No lesions or rashes  LYMPH NODES: none enlarged            CBC with Differential:    Lab Results   Component Value Date/Time    WBC 8.2 02/12/2021 07:05 AM    RBC 3.91 02/12/2021 07:05 AM    HGB 12.1 02/12/2021 07:05 AM    HCT 37.4 02/12/2021 07:05 AM     02/12/2021 07:05 AM    MCV 95.7 02/12/2021 07:05 AM    MCH 31.0 02/12/2021 07:05 AM    MCHC 32.4 02/12/2021 07:05 AM    RDW 12.1 02/12/2021 07:05 AM    LYMPHOPCT 22.7 02/12/2021 07:05 AM    MONOPCT 3.8 02/12/2021 07:05 AM    BASOPCT 0.4 02/12/2021 07:05 AM    MONOSABS 0.3 02/12/2021 07:05 AM    LYMPHSABS 1.9 02/12/2021 07:05 AM    EOSABS 0.2 02/12/2021 07:05 AM    BASOSABS 0.0 02/12/2021 07:05 AM     BMP:    Lab Results   Component Value Date/Time     08/09/2021 09:42 AM    K 4.2 08/09/2021 09:42 AM     08/09/2021 09:42 AM    CO2 27 08/09/2021 09:42 AM    BUN 13 08/09/2021 09:42 AM    LABALBU 4.5 08/09/2021 09:42 AM    CREATININE 0.53 08/09/2021 09:42 AM    CALCIUM 10.0 08/09/2021 09:42 AM    GFRAA >60 08/05/2021 06:55 AM    LABGLOM >60 08/05/2021 06:55 AM     Uric Acid:  No components found for: URIC  PT/INR:    Lab Results   Component Value Date/Time    PROTIME 11.3 07/24/2020 07:03 AM    INR 0.97 07/24/2020 07:03 AM     Last 3 Troponin:    Lab Results   Component Value Date/Time    TROPONINI <0.01 03/12/2018 06:37 AM     FLP:    Lab Results   Component Value Date/Time    TRIG 63 02/12/2021 07:05 AM    HDL 69 02/12/2021 07:05 AM    LDLCALC 69 02/12/2021 07:05 AM    LABVLDL 13 02/12/2021 07:05 AM       EKG: On 8/30/2022 sinus rhythm at 96/min. Low voltage in precordial leads. Sallyanne Chain echocardiogram  This patient was educated using the patient point room wall mount device. Absence from smokers and smoking and diet and exercising are important. Assessment/ Plan   . Syncope, unspecified syncope type  Episode may have been a syncopal episode. I find nothing on examination that would suggest an etiology. EKG shows sinus rhythm at 96/min. Plan to obtain carotid Dopplers on her.   Echocardiogram.  We will have her wear a monitor for 2 weeks and may need a loop recorder. Need to watch her blood pressure as it was high when she came in and may be normal most of the time otherwise. - EKG 12 Lead    Please cc this note to Dr. Karel Gama  Thanks for allowing us the opportunity  to participate in the evaluation and care of your patients.  Please call if we may assist further [phone]    This note was likely completed using voice recognition technology and may contain unintended errors  Karla Melo M.D., [facility] [place]  10/10/66046:47 AM

## 2022-10-10 NOTE — PROGRESS NOTES
Tobacco Never     Current Medications:  Prior to Visit Medications    Medication Sig Taking? Authorizing Provider   ALPRAZolam Oziel Sparks) 1 MG tablet Take 1 tablet by mouth nightly as needed for Sleep for up to 90 days. Yes Farley Runner, MD   cetirizine (ZYRTEC) 10 MG tablet Take 10 mg by mouth daily Yes Historical Provider, MD   Vitamins A & D (VITAMIN A & D) [address] units TABS Take by mouth in the morning and at bedtime Yes Historical Provider, MD   Omega-3 Fatty Acids (FISH OIL PO) Take 1 capsule by mouth in the morning, at noon, and at bedtime  Yes Historical Provider, MD   Cyanocobalamin (B-12 PO) Take by mouth daily  Yes Historical Provider, MD   ramipril (ALTACE) 10 MG capsule TAKE 1 CAPSULE DAILY FOR BLOOD PRESSURE Yes Farley Runner, MD   sertraline (ZOLOFT) 50 MG tablet TAKE 1 TABLET DAILY FOR MOOD Yes Farley Runner, MD   atorvastatin (LIPITOR) 20 MG tablet TAKE 1 TABLET DAILY Yes Henry Davis MD   metFORMIN (GLUCOPHAGE) 850 MG tablet TAKE 1 TABLET TWICE A DAY WITH MEALS Yes Henry Davis MD   gemfibrozil (LOPID) 600 MG tablet TAKE 1 TABLET TWICE A DAY FOR TRIGLYCERIDES Yes Henry Davis MD   insulin glargine (BASAGLAR KWIKPEN) 100 UNIT/ML injection pen Inject 15 Units into the skin nightly Yes Henry Davis MD   Glucosamine-Chondroitin (GLUCOSAMINE CHONDR COMPLEX PO) Take by mouth daily Yes Historical Provider, MD     Family History  Family History   Problem Relation Age of Onset    Heart Disease Mother     Diabetes Father        Current Medications  Current Outpatient Medications   Medication Sig Dispense Refill    ALPRAZolam (XANAX) 1 MG tablet Take 1 tablet by mouth nightly as needed for Sleep for up to 90 days.  90 tablet 0    cetirizine (ZYRTEC) 10 MG tablet Take 10 mg by mouth daily      Vitamins A & D (VITAMIN A & D) [address] units TABS Take by mouth in the morning and at bedtime      Omega-3 Fatty Acids (FISH OIL PO) Take 1 capsule by mouth in the morning, at noon, and at bedtime Cyanocobalamin (B-12 PO) Take by mouth daily       ramipril (ALTACE) 10 MG capsule TAKE 1 CAPSULE DAILY FOR BLOOD PRESSURE 90 capsule 3    sertraline (ZOLOFT) 50 MG tablet TAKE 1 TABLET DAILY FOR MOOD 90 tablet 3    atorvastatin (LIPITOR) 20 MG tablet TAKE 1 TABLET DAILY 90 tablet 1    metFORMIN (GLUCOPHAGE) 850 MG tablet TAKE 1 TABLET TWICE A DAY WITH MEALS 180 tablet 1    gemfibrozil (LOPID) 600 MG tablet TAKE 1 TABLET TWICE A DAY FOR TRIGLYCERIDES 180 tablet 1    insulin glargine (BASAGLAR KWIKPEN) 100 UNIT/ML injection pen Inject 15 Units into the skin nightly 5 pen 3    Glucosamine-Chondroitin (GLUCOSAMINE CHONDR COMPLEX PO) Take by mouth daily       No current facility-administered medications for this visit. REVIEW OF SYSTEMS:    CONSTITUTIONAL: No major weight gain or loss, fatigue, weakness, night sweats or fever. HEENT: No new vision difficulties or ringing in the ears. RESPIRATORY: No new SOB, PND, orthopnea or cough. CARDIOVASCULAR: See HPI  GI: No nausea, vomiting, diarrhea, constipation, abdominal pain or changes in bowel habits. : No urinary frequency, urgency, incontinence hematuria or dysuria. SKIN: No cyanosis or skin lesions. MUSCULOSKELETAL: No new muscle or joint pain. NEUROLOGICAL: No syncope or TIA-like symptoms. PSYCHIATRIC: No anxiety, pain, insomnia or depression    Objective:   PHYSICAL EXAM:        VITALS:    Wt Readings from Last 3 Encounters:   10/10/22 188 lb (85.3 kg)   09/27/22 188 lb (85.3 kg)   08/30/22 191 lb (86.6 kg)     BP Readings from Last 3 Encounters:   10/10/22 130/78   09/27/22 128/86   08/30/22 (!) 162/90     Pulse Readings from Last 3 Encounters:   10/10/22 84   09/27/22 88   08/30/22 (!) 101       CONSTITUTIONAL: Cooperative, no apparent distress, and appears well nourished / developed  NEUROLOGIC:  Awake and orientated to person, place and time. PSYCH: Calm affect. SKIN: Warm and dry.   HEENT: Sclera non-icteric, normocephalic, neck supple, no elevation of JVP, normal carotid pulses with no bruits and thyroid normal size. LUNGS:  No increased work of breathing and clear to auscultation, no crackles or wheezing  CARDIOVASCULAR:  Regular rate and rhythm with no murmurs, gallops, rubs, or abnormal heart sounds, normal PMI. The apical impulses not displaced  Heart tones are crisp and normal  Cervical veins are not engorged  The carotid upstroke is normal in amplitude and contour without delay or bruit  JVP is not elevated  ABDOMEN:  Normal bowel sounds, non-distended and non-tender to palpation  EXT: No edema, no calf tenderness. Pulses are present bilaterally. DATA:    Lab Results   Component Value Date    ALT 17 08/09/2021    AST 18 08/09/2021    ALKPHOS 71 08/09/2021    BILITOT 0.5 08/09/2021     Lab Results   Component Value Date    CREATININE 0.53 08/09/2021    BUN 13 08/09/2021     08/09/2021    K 4.2 08/09/2021     08/09/2021    CO2 27 08/09/2021     Lab Results   Component Value Date    TSH 1.160 06/23/2021    O6SGVPI 6.7 05/03/2016     Lab Results   Component Value Date    WBC 8.2 02/12/2021    HGB 12.1 02/12/2021    HCT 37.4 02/12/2021    MCV 95.7 02/12/2021     02/12/2021     No components found for: CHLPL  Lab Results   Component Value Date    TRIG 63 02/12/2021    TRIG 35 04/10/2019    TRIG 64 09/13/2017     Lab Results   Component Value Date    HDL 69 (H) 02/12/2021    HDL 85 (H) 04/10/2019    HDL 77 (H) 09/13/2017     Lab Results   Component Value Date    LDLCALC 69 02/12/2021    LDLCALC 69 04/10/2019    [address] 09/13/2017     Lab Results   Component Value Date    LABVLDL 13 02/12/2021    LABVLDL 7 04/10/2019    LABVLDL 13 09/13/2017     Radiology Review:  Pertinent images / reports were reviewed as a part of this visit and reveals the following:    Echo:Summary9/13/22   Left ventricular systolic function is hyperdynamic with an estimated   ejection fraction of >= 65%.    The left ventricle is normal in size with normal wall thickness. No obvious regional wall motion abnormalities noted. Normal left ventricular diastolic function. No significant valvular disease. Inadequate tricuspid valve regurgitation to estimate systolic pulmonary   artery pressure. Stress Test / Angiogram:    ECG: As noted  This patient was educated using the patient point room wall mount device. Absence from smokers and smoking and diet and exercising are important. Assessment:   Palpitation but nothing significant found on the ZIO. At echocardiograph was unremarkable. Plan: At this time she looks stable from a cardiac perspective. There are no actionable findings. We will continue to treat her expectantly and follow her back in about 6 months. Sooner if she has issues. Please call if we can assist further [phone]. Jimmy Garner.  Naz SWAIN, [facility] [place]      This note was likely completed using voice recognition technology and may contain unintended errors

## 2022-11-08 ENCOUNTER — OFFICE VISIT (OUTPATIENT)
Dept: INTERNAL MEDICINE CLINIC | Age: 62
End: 2022-11-08
Payer: COMMERCIAL

## 2022-11-08 VITALS
HEIGHT: 64 IN | SYSTOLIC BLOOD PRESSURE: 132 MMHG | WEIGHT: 186 LBS | DIASTOLIC BLOOD PRESSURE: 68 MMHG | BODY MASS INDEX: 31.76 KG/M2

## 2022-11-08 DIAGNOSIS — Z00.00 ROUTINE GENERAL MEDICAL EXAMINATION AT A HEALTH CARE FACILITY: Primary | ICD-10-CM

## 2022-11-08 DIAGNOSIS — F43.23 SITUATIONAL MIXED ANXIETY AND DEPRESSIVE DISORDER: ICD-10-CM

## 2022-11-08 DIAGNOSIS — E11.9 TYPE 2 DIABETES MELLITUS WITHOUT COMPLICATION, WITH LONG-TERM CURRENT USE OF INSULIN (HCC): ICD-10-CM

## 2022-11-08 DIAGNOSIS — I10 PRIMARY HYPERTENSION: ICD-10-CM

## 2022-11-08 DIAGNOSIS — Z79.4 TYPE 2 DIABETES MELLITUS WITHOUT COMPLICATION, WITH LONG-TERM CURRENT USE OF INSULIN (HCC): ICD-10-CM

## 2022-11-08 DIAGNOSIS — E66.09 CLASS 1 OBESITY DUE TO EXCESS CALORIES WITH SERIOUS COMORBIDITY AND BODY MASS INDEX (BMI) OF 31.0 TO 31.9 IN ADULT: ICD-10-CM

## 2022-11-08 DIAGNOSIS — E78.49 OTHER HYPERLIPIDEMIA: ICD-10-CM

## 2022-11-08 PROCEDURE — 99396 PREV VISIT EST AGE 40-64: CPT | Performed by: INTERNAL MEDICINE

## 2022-11-08 PROCEDURE — G8484 FLU IMMUNIZE NO ADMIN: HCPCS | Performed by: INTERNAL MEDICINE

## 2022-11-08 PROCEDURE — 3074F SYST BP LT 130 MM HG: CPT | Performed by: INTERNAL MEDICINE

## 2022-11-08 PROCEDURE — 3078F DIAST BP <80 MM HG: CPT | Performed by: INTERNAL MEDICINE

## 2022-11-08 RX ORDER — RAMIPRIL 10 MG/1
CAPSULE ORAL
Qty: 90 CAPSULE | Refills: 3 | Status: SHIPPED | OUTPATIENT
Start: 2022-11-08

## 2022-11-08 RX ORDER — EMPAGLIFLOZIN 25 MG/1
TABLET, FILM COATED ORAL
COMMUNITY
Start: 2022-10-19

## 2022-11-08 NOTE — PROGRESS NOTES
2022    Livia Lee (:  1960) tiffanie 65 y.o. female, here for a preventive medicine evaluation. Patient Active Problem List   Diagnosis    Hypertension    Hyperlipidemia    Situational mixed anxiety and depressive disorder    Type 2 diabetes mellitus without complication, with long-term current use of insulin ([facility])     Overall doing well. The back pain is intermittent but controlled. She is working on weight loss. Started SGLT2 which seems to be helping with the sugars. Alprazolam helps with sleep. Anxiety is controlled with sertraline. Sees gynecology, up to date on screening. Review of Systems   Constitutional:  Negative for fatigue and unexpected weight change. HENT:  Negative for ear pain and sinus pain. Eyes:  Negative for visual disturbance. Respiratory:  Negative for cough and shortness of breath. Cardiovascular:  Negative for chest pain and leg swelling. Gastrointestinal:  Negative for constipation and diarrhea. Genitourinary:  Negative for difficulty urinating and dysuria. Musculoskeletal:  Positive for back pain (intermittent). Negative for joint swelling. Skin:  Negative for rash. Neurological:  Negative for weakness and numbness. Psychiatric/Behavioral:  Positive for sleep disturbance (controlled on medication). Negative for dysphoric mood. Prior to Visit Medications    Medication Sig Taking? Authorizing Provider   JARDIANCE 25 MG tablet  Yes Historical Provider, MD   sertraline (ZOLOFT) 50 MG tablet TAKE 1 TABLET DAILY FOR MOOD Yes Lalita Cool MD   ramipril (ALTACE) 10 MG capsule TAKE 1 CAPSULE DAILY FOR BLOOD PRESSURE Yes Lalita Cool MD   ALPRAZolam Nicky Patterson) 1 MG tablet Take 1 tablet by mouth nightly as needed for Sleep for up to 90 days.  Yes Lalita Cool MD   cetirizine (ZYRTEC) 10 MG tablet Take 10 mg by mouth daily Yes Historical Provider, MD   Vitamins A & D (VITAMIN A & D) [address] units TABS Take by mouth in the morning and at bedtime Yes Historical Provider, MD   Omega-3 Fatty Acids (FISH OIL PO) Take 1 capsule by mouth in the morning, at noon, and at bedtime  Yes Historical Provider, MD   Cyanocobalamin (B-12 PO) Take by mouth daily  Yes Historical Provider, MD   atorvastatin (LIPITOR) 20 MG tablet TAKE 1 TABLET DAILY Yes Isaac Collier MD   metFORMIN (GLUCOPHAGE) 850 MG tablet TAKE 1 TABLET TWICE A DAY WITH MEALS Yes Isaac Collier MD   gemfibrozil (LOPID) 600 MG tablet TAKE 1 TABLET TWICE A DAY FOR TRIGLYCERIDES Yes Isaac Collier MD   insulin glargine (BASAGLAR KWIKPEN) 100 UNIT/ML injection pen Inject 15 Units into the skin nightly  Patient taking differently: Inject 14 Units into the skin nightly Yes Isaac Collier MD   Glucosamine-Chondroitin (GLUCOSAMINE CHONDR COMPLEX PO) Take by mouth daily Yes Historical Provider, MD        No Known Allergies    Past Medical History:   Diagnosis Date    Allergic rhinitis     Asthma     H/O- stress related    Diabetes mellitus, type 1     Fibroid     History of blood transfusion 2001?     Hyperlipidemia     Menopause ovarian failure     Other acute reactions to stress     Pancreatitis 2000    Rectal abscess     II       Past Surgical History:   Procedure Laterality Date    BACK SURGERY N/A 07/29/2020    BRONCHOSCOPY N/A 3/4/2022    NAVIGATIONAL BRONCHOSCOPY WITH MONARCH AND RADIAL PROBE. ENDOBRONCHIAL ULTRASOUND performed by Pauline Meehan MD at [address] (CERVIX STATUS UNKNOWN)      HYSTERECTOMY, TOTAL ABDOMINAL (CERVIX REMOVED)      KNEE ARTHROSCOPY Right 2014 x2    Dr. Ivy To ARTHROSCOPY Left 2015    OTHER SURGICAL HISTORY  6/1/16    EUS, no interventions    OTHER SURGICAL HISTORY  6/2/16    EXAMINATION UNDER ANESTHESIA, EXCISION OF ANAL MASS    OVARY REMOVAL Bilateral     TUBAL LIGATION         Social History     Socioeconomic History    Marital status: Single     Spouse name: Not on file    Number of children: Not on file Years of education: Not on file    Highest education level: Not on file   Occupational History    Not on file   Tobacco Use    Smoking status: Never    Smokeless tobacco: Never   Vaping Use    Vaping Use: Never used   Substance and Sexual Activity    Alcohol use: Yes     Comment: socially     Drug use: No    Sexual activity: Yes     Partners: Male   Other Topics Concern    Not on file   Social History Narrative    Not on file     Social Determinants of Health     Financial Resource Strain: Low Risk     Difficulty of Paying Living Expenses: Not hard at all   Food Insecurity: No Food Insecurity    Worried About Running Out of Food in the Last Year: Never true    Ran Out of Food in the Last Year: Never true   Transportation Needs: Not on file   Physical Activity: Not on file   Stress: Not on file   Social Connections: Not on file   Intimate Partner Violence: Not on file   Housing Stability: Not on file        Family History   Problem Relation Age of Onset    Heart Disease Mother     Diabetes Father        ADVANCEDIRECTIVE: N, <no information>    Vitals:    11/08/22 1449   BP: 132/68   Site: Left Upper Arm   Weight: 186 lb (84.4 kg)   Height: 5' 4\" (1.626 m)     Estimated body mass index is 31.93 kg/m² as calculated from the following:    Height as of this encounter: 5' 4\" (1.626 m). Weight as of this encounter: 186 lb (84.4 kg). Physical Exam  Vitals reviewed. Constitutional:       General: She is not in acute distress. Appearance: Normal appearance. She is well-developed. HENT:      Head: Normocephalic and atraumatic. Right Ear: Tympanic membrane, ear canal and external ear normal.      Left Ear: Tympanic membrane, ear canal and external ear normal.   Eyes:      General: No scleral icterus. Conjunctiva/sclera: Conjunctivae normal.   Neck:      Thyroid: No thyroid mass, thyromegaly or thyroid tenderness. Vascular: No carotid bruit.    Cardiovascular:      Rate and Rhythm: Normal rate and 30 mcg/0.3 mL dose 11/08/2022    COVID-19, PFIZER PURPLE top, DILUTE for use, (age 15 y+), 30mcg/0.3mL 03/12/2021, 04/02/2021, 10/14/2021    Influenza Virus Vaccine 10/07/2014, 10/06/2016, 10/06/2016, 10/31/2018, 10/31/2018, 10/14/2021, 11/08/2022    Influenza Whole 10/07/2014, 10/08/2015    Influenza, AFLURIA (age 1 yrs+), FLUZONE, (age 10 mo+), MDV, 0.5mL 10/06/2016, 10/31/2018    Influenza, FLUARIX, FLULAVAL, FLUZONE (age 10 mo+) AND AFLURIA, (age 1 y+), PF, 0.5mL 09/24/2019    Influenza, FLUBLOK, (age 25 y+), PF, 0.5mL 09/24/2019, 10/18/2020    Influenza, Intradermal, Preservative free 10/04/2017    Pneumococcal Polysaccharide ([id number]) 10/31/2018    Td (Adult), 5 Lf Tetanus Toxoid, Pf (Tenivac, Decavac) 10/01/2011    Td, (Adult) Not Adsorbed 02/19/2010    Td, unspecified formulation 10/01/2011    Tdap (Boostrix, Adacel) 02/19/2010, 10/06/2016    Zoster Recombinant (Shingrix) 09/24/2019, 01/09/2020       Health Maintenance   Topic Date Due    Diabetic retinal exam  04/30/2016    Diabetic foot exam  11/25/2020    Diabetic microalbuminuria test  09/02/2022    A1C test (Diabetic or Prediabetic)  01/14/2023    Depression Monitoring  09/26/2023    Lipids  10/14/2023    Breast cancer screen  04/11/2024    Colorectal Cancer Screen  04/29/2026    DTaP/Tdap/Td vaccine (4 - Td or Tdap) 10/06/2026    Flu vaccine  Completed    Shingles vaccine  Completed    Pneumococcal 0-64 years Vaccine  Completed    COVID-19 Vaccine  Completed    Hepatitis C screen  Addressed    HIV screen  Addressed    Hepatitis A vaccine  Aged Out    Hib vaccine  Aged Out    Meningococcal (ACWY) vaccine  Aged Out       ASSESSMENT/PLAN:  1. Routine general medical examination at a health care facility  - Discussed age appropriate preventive care including healthy diet, daily exercise, immunizations and age & gender guided screening tests.      2. Type 2 diabetes mellitus without complication, with long-term current use of insulin (HCC)  -Seeing Juaquin, having some improvement with addition of SGLT2, on insulin and metformin. 3. Primary hypertension  -Controlled, continue ramipril 10 mg daily    4. Other hyperlipidemia  -Controlled, continue atorvastatin 20 mg daily, gemfibrozil 600 mg twice daily    5. Situational mixed anxiety and depressive disorder  -Stable. Continue sertraline 50 mg daily, alprazolam for sleep at night. 6. Class 1 obesity due to excess calories with serious comorbidity and body mass index (BMI) of 31.0 to 31.9 in adult  -Working on weight loss    Return in about 3 months (around 2/8/2023) for sleep.

## 2022-11-09 ASSESSMENT — ENCOUNTER SYMPTOMS
COUGH: 0
DIARRHEA: 0
SHORTNESS OF BREATH: 0
CONSTIPATION: 0
SINUS PAIN: 0
BACK PAIN: 1

## 2022-12-22 DIAGNOSIS — F43.23 SITUATIONAL MIXED ANXIETY AND DEPRESSIVE DISORDER: ICD-10-CM

## 2022-12-22 RX ORDER — ALPRAZOLAM 1 MG/1
1 TABLET ORAL NIGHTLY PRN
Qty: 3 TABLET | Refills: 0 | Status: SHIPPED | OUTPATIENT
Start: 2023-01-03 | End: 2023-01-06 | Stop reason: SDUPTHER

## 2022-12-22 NOTE — TELEPHONE ENCOUNTER
Refill      ALPRAZolam Jennett Dubin) 1 MG tablet         [facility] [id number] [facility] [id number] [facility] [phone]      Patient has appt on 1/6/22    But will be out of meds 1/3/22      She has vv scheduled

## 2023-01-06 ENCOUNTER — TELEMEDICINE (OUTPATIENT)
Dept: INTERNAL MEDICINE CLINIC | Age: 63
End: 2023-01-06
Payer: COMMERCIAL

## 2023-01-06 DIAGNOSIS — F43.23 SITUATIONAL MIXED ANXIETY AND DEPRESSIVE DISORDER: Primary | ICD-10-CM

## 2023-01-06 DIAGNOSIS — F43.21 GRIEF: ICD-10-CM

## 2023-01-06 PROCEDURE — 99213 OFFICE O/P EST LOW 20 MIN: CPT | Performed by: INTERNAL MEDICINE

## 2023-01-06 PROCEDURE — 3017F COLORECTAL CA SCREEN DOC REV: CPT | Performed by: INTERNAL MEDICINE

## 2023-01-06 PROCEDURE — G8427 DOCREV CUR MEDS BY ELIG CLIN: HCPCS | Performed by: INTERNAL MEDICINE

## 2023-01-06 RX ORDER — ALPRAZOLAM 1 MG/1
1 TABLET ORAL NIGHTLY PRN
Qty: 90 TABLET | Refills: 0 | Status: SHIPPED | OUTPATIENT
Start: 2023-01-06 | End: 2023-04-06

## 2023-01-06 ASSESSMENT — PATIENT HEALTH QUESTIONNAIRE - PHQ9
7. TROUBLE CONCENTRATING ON THINGS, SUCH AS READING THE NEWSPAPER OR WATCHING TELEVISION: 0
10. IF YOU CHECKED OFF ANY PROBLEMS, HOW DIFFICULT HAVE THESE PROBLEMS MADE IT FOR YOU TO DO YOUR WORK, TAKE CARE OF THINGS AT HOME, OR GET ALONG WITH OTHER PEOPLE: 0
3. TROUBLE FALLING OR STAYING ASLEEP: 0
SUM OF ALL RESPONSES TO PHQ QUESTIONS 1-9: 0
6. FEELING BAD ABOUT YOURSELF - OR THAT YOU ARE A FAILURE OR HAVE LET YOURSELF OR YOUR FAMILY DOWN: 0
SUM OF ALL RESPONSES TO PHQ QUESTIONS 1-9: 0
2. FEELING DOWN, DEPRESSED OR HOPELESS: 0
9. THOUGHTS THAT YOU WOULD BE BETTER OFF DEAD, OR OF HURTING YOURSELF: 0
SUM OF ALL RESPONSES TO PHQ QUESTIONS 1-9: 0
SUM OF ALL RESPONSES TO PHQ QUESTIONS 1-9: 0
5. POOR APPETITE OR OVEREATING: 0
8. MOVING OR SPEAKING SO SLOWLY THAT OTHER PEOPLE COULD HAVE NOTICED. OR THE OPPOSITE, BEING SO FIGETY OR RESTLESS THAT YOU HAVE BEEN MOVING AROUND A LOT MORE THAN USUAL: 0
4. FEELING TIRED OR HAVING LITTLE ENERGY: 0
1. LITTLE INTEREST OR PLEASURE IN DOING THINGS: 0
SUM OF ALL RESPONSES TO PHQ9 QUESTIONS 1 & 2: 0

## 2023-01-06 NOTE — PROGRESS NOTES
Orin Edward (:  1960) is a Established patient, here for evaluation of the following:    Assessment & Plan   Below is the assessment and plan developed based on review of pertinent history, physical exam, labs, studies, and medications. 1. Situational mixed anxiety and depressive disorder  -Stable. Continue current medications. OARRS reviewed, no concerns  -     ALPRAZolam (XANAX) 1 MG tablet; Take 1 tablet by mouth nightly as needed for Sleep for up to 90 days. Max Daily Amount: 1 mg, Disp-90 tablet, R-0Normal  2. Grief   -Overall she is doing about as expected, discussed potential to adjust medication if she feels like things are not improving the way she would expect, monitor for now    Return in about 3 months (around 2023). Subjective   HPI    She has been having a tough time since her mom passed a month ago, but she feels like she is grieving appropriately. She has episodes of tearfulness, still able to function at work. Sensing alprazolam at night as well as the sertraline, she feels like medications are working well. Review of Systems       Objective   Patient-Reported Vitals  Patient-Reported Weight: 178lb       Physical Exam  Vitals reviewed. Constitutional:       General: She is not in acute distress. Appearance: Normal appearance. She is well-developed. HENT:      Head: Normocephalic and atraumatic. Cardiovascular:      Heart sounds: Normal heart sounds. Pulmonary:      Effort: Pulmonary effort is normal. No respiratory distress. Skin:     General: Skin is warm and dry. Neurological:      Mental Status: She is alert. Psychiatric:         Mood and Affect: Affect is tearful (intermittently). Behavior: Behavior normal.         Thought Content: Thought content normal.         Judgment: Judgment normal.                Orin Edward, was evaluated through a synchronous (real-time) audio-video encounter.  The patient (or guardian if applicable) is aware that this is a billable service, which includes applicable co-pays. This Virtual Visit was conducted with patient's (and/or legal guardian's) consent. The visit was conducted pursuant to the emergency declaration under the [facility], [facility] authority and the [place] [employer] Act. Patient identification was verified, and a caregiver was present when appropriate. The patient was located at Home: [address] [place] [address]. Provider was located at [facility] ([address] [place] [address]): [phone] E. [address] [place] [address], [address] [place] [address],  [address].         --Sohan Jesus MD

## 2023-01-18 ENCOUNTER — OFFICE VISIT (OUTPATIENT)
Dept: INTERNAL MEDICINE CLINIC | Age: 63
End: 2023-01-18
Payer: COMMERCIAL

## 2023-01-18 VITALS
DIASTOLIC BLOOD PRESSURE: 80 MMHG | BODY MASS INDEX: 30.38 KG/M2 | OXYGEN SATURATION: 97 % | SYSTOLIC BLOOD PRESSURE: 128 MMHG | HEART RATE: 60 BPM | TEMPERATURE: 97.9 F | WEIGHT: 177 LBS

## 2023-01-18 DIAGNOSIS — S42.412D CLOSED SUPRACONDYLAR FRACTURE OF LEFT HUMERUS WITH ROUTINE HEALING: ICD-10-CM

## 2023-01-18 DIAGNOSIS — Z01.818 PREOP EXAMINATION: Primary | ICD-10-CM

## 2023-01-18 PROCEDURE — G8417 CALC BMI ABV UP PARAM F/U: HCPCS | Performed by: STUDENT IN AN ORGANIZED HEALTH CARE EDUCATION/TRAINING PROGRAM

## 2023-01-18 PROCEDURE — 3017F COLORECTAL CA SCREEN DOC REV: CPT | Performed by: STUDENT IN AN ORGANIZED HEALTH CARE EDUCATION/TRAINING PROGRAM

## 2023-01-18 PROCEDURE — 3079F DIAST BP 80-89 MM HG: CPT | Performed by: STUDENT IN AN ORGANIZED HEALTH CARE EDUCATION/TRAINING PROGRAM

## 2023-01-18 PROCEDURE — 1036F TOBACCO NON-USER: CPT | Performed by: STUDENT IN AN ORGANIZED HEALTH CARE EDUCATION/TRAINING PROGRAM

## 2023-01-18 PROCEDURE — 3074F SYST BP LT 130 MM HG: CPT | Performed by: STUDENT IN AN ORGANIZED HEALTH CARE EDUCATION/TRAINING PROGRAM

## 2023-01-18 PROCEDURE — G8484 FLU IMMUNIZE NO ADMIN: HCPCS | Performed by: STUDENT IN AN ORGANIZED HEALTH CARE EDUCATION/TRAINING PROGRAM

## 2023-01-18 PROCEDURE — 93000 ELECTROCARDIOGRAM COMPLETE: CPT | Performed by: STUDENT IN AN ORGANIZED HEALTH CARE EDUCATION/TRAINING PROGRAM

## 2023-01-18 PROCEDURE — 99213 OFFICE O/P EST LOW 20 MIN: CPT | Performed by: STUDENT IN AN ORGANIZED HEALTH CARE EDUCATION/TRAINING PROGRAM

## 2023-01-18 PROCEDURE — G8427 DOCREV CUR MEDS BY ELIG CLIN: HCPCS | Performed by: STUDENT IN AN ORGANIZED HEALTH CARE EDUCATION/TRAINING PROGRAM

## 2023-01-18 ASSESSMENT — ENCOUNTER SYMPTOMS
VOICE CHANGE: 0
WHEEZING: 0
CHEST TIGHTNESS: 0
APNEA: 0
TROUBLE SWALLOWING: 0
CHOKING: 0
COUGH: 0
CONSTIPATION: 0
VOMITING: 0
SHORTNESS OF BREATH: 0
STRIDOR: 0
NAUSEA: 0
DIARRHEA: 0
ABDOMINAL PAIN: 0
ABDOMINAL DISTENTION: 0
PHOTOPHOBIA: 0

## 2023-01-18 ASSESSMENT — PATIENT HEALTH QUESTIONNAIRE - PHQ9
SUM OF ALL RESPONSES TO PHQ QUESTIONS 1-9: 1
2. FEELING DOWN, DEPRESSED OR HOPELESS: 1
SUM OF ALL RESPONSES TO PHQ QUESTIONS 1-9: 1
1. LITTLE INTEREST OR PLEASURE IN DOING THINGS: 0
SUM OF ALL RESPONSES TO PHQ QUESTIONS 1-9: 1
SUM OF ALL RESPONSES TO PHQ9 QUESTIONS 1 & 2: 1
SUM OF ALL RESPONSES TO PHQ QUESTIONS 1-9: 1

## 2023-01-18 NOTE — ASSESSMENT & PLAN NOTE
-Medication adjustment before surgery was discussed with patient in details  -EKG and lab work ordered as requested by surgeon     Addendum: UA shows dehydration vs ketoacidosis. Hold Jardiance starting 1/19/2023. Repeat in few days. Pending preop clearance - Discussed with Dr. Shailesh Car. She will follow up on repeat UA.

## 2023-01-18 NOTE — PATIENT INSTRUCTIONS
1.Hold Metformin in the morning of the surgery   2. Hold Jardiance 3 days before the surgery   3. Hold Ramipril in the morning of the surgery   4.  Take 7 units of Lantus the night before your surgery   Hold all NSAIDs for 7 days before surgery   Hold fish oil on the day of your surgery     Resume all medications after surgery like normal

## 2023-01-18 NOTE — ASSESSMENT & PLAN NOTE
Sustaining fall at home 4 days ago and broke her left arm.   X-ray at [facility] with showed fracture of left humerus head and surgical neck  -Evaluated by orthopedics at [place]  -Has plan for surgery on 1/25/2023  -Pain controlled with Oxycodone and tylenol per ortho  -Patient agrees with surgery

## 2023-01-18 NOTE — PROGRESS NOTES
Pre-Op Examination    :  Otis Busby                                               : 1960  Age: 65 y.o. MRN: [id number]  Date : 2023    Referring Physician: Dr. Shaniqua Villa     Procedure: Left humerus and shoulder surgery on 2023    HISTORY OF PRESENT ILLNESS:   The patient is a 65 y.o. female who presents today with her sister for preoperative examination. About 4 days ago patient fell at home after skip a step in the kitchen. She was seen in the emergency room at [place] [address]. X-ray of the left arm shows fracture of the humerus head and surgical neck. She will evaluate by orthopedics at [place] has plan for surgery on 2023. At baseline, she can walk several miles without shortness of breath. Denies chest pain, shortness of breath, fever, chill, recent sickness. Planned anesthesia:  General   Known anesthesia problems: None   Bleeding risk:  Low   Personal or FH of DVT/PE:  None  Patient objection toreceiving blood products: None       Past Medical History:        Diagnosis Date    Allergic rhinitis     Asthma     H/O- stress related    Diabetes mellitus, type 1     Fibroid     History of blood transfusion ?     Hyperlipidemia     Menopause ovarian failure     Other acute reactions to stress     Pancreatitis     Rectal abscess     II       Past Surgical History:        Procedure Laterality Date    BACK SURGERY N/A 2020    BRONCHOSCOPY N/A 3/4/2022    NAVIGATIONAL BRONCHOSCOPY WITH MONARCH AND RADIAL PROBE. ENDOBRONCHIAL ULTRASOUND performed by Guerline Hyde MD at [address] (CERVIX STATUS UNKNOWN)      HYSTERECTOMY, TOTAL ABDOMINAL (CERVIX REMOVED)      KNEE ARTHROSCOPY Right 2014 x2    Dr. Joo Loza ARTHROSCOPY Left     OTHER SURGICAL HISTORY  16    EUS, no interventions    OTHER SURGICAL HISTORY  16    EXAMINATION UNDER ANESTHESIA, EXCISION OF ANAL MASS    OVARY REMOVAL Bilateral     TUBAL LIGATION         Family History:       Problem Relation Age of Onset    Heart Disease Mother     Diabetes Father        Social History:   TOBACCO:   reports that she has never smoked. She has never used smokeless tobacco.  ETOH:   reports current alcohol use. OCCUPATION:      Allergies:  Patient has no known allergies. Current Medications:    Prior to Admission medications    Medication Sig Start Date End Date Taking? Authorizing Provider   Cholecalciferol (VITAMIN D3) 125 MCG (5000 UT) TABS Take by mouth Take 2, once daily   Yes Historical Provider, MD   ALPRAZolam [place]) 1 MG tablet Take 1 tablet by mouth nightly as needed for Sleep for up to 90 days.  Max Daily Amount: 1 mg 1/6/23 4/6/23 Yes Chris Mendoza MD   JARDIANCE 25 MG tablet  10/19/22  Yes Historical Provider, MD   sertraline (ZOLOFT) 50 MG tablet TAKE 1 TABLET DAILY FOR MOOD 11/8/22  Yes Chris Mendoza MD   ramipril (ALTACE) 10 MG capsule TAKE 1 CAPSULE DAILY FOR BLOOD PRESSURE 11/8/22  Yes Chris Mendoza MD   cetirizine (ZYRTEC) 10 MG tablet Take 10 mg by mouth daily   Yes Historical Provider, MD   Omega-3 Fatty Acids (FISH OIL PO) Take 1 capsule by mouth in the morning, at noon, and at bedtime    Yes Historical Provider, MD   Cyanocobalamin (B-12 PO) Take by mouth daily    Yes Historical Provider, MD   atorvastatin (LIPITOR) 20 MG tablet TAKE 1 TABLET DAILY 8/11/21  Yes Keily Hayes MD   metFORMIN (GLUCOPHAGE) 850 MG tablet TAKE 1 TABLET TWICE A DAY WITH MEALS 8/11/21  Yes Keily Hayes MD   gemfibrozil (LOPID) 600 MG tablet TAKE 1 TABLET TWICE A DAY FOR TRIGLYCERIDES 8/11/21  Yes Keily Hayes MD   insulin glargine (BASAGLAR KWIKPEN) 100 UNIT/ML injection pen Inject 15 Units into the skin nightly  Patient taking differently: Inject 14 Units into the skin nightly 6/14/21  Yes Keily Hayes MD   Glucosamine-Chondroitin (GLUCOSAMINE CHONDR COMPLEX PO) Take by mouth daily   Yes Historical Provider, MD       REVIEW OF SYSTEMS:  Review of Systems   Constitutional:  Negative for activity change, appetite change, chills, diaphoresis, fatigue, fever and unexpected weight change. HENT:  Negative for trouble swallowing and voice change. Eyes:  Negative for photophobia and visual disturbance. Respiratory:  Negative for apnea, cough, choking, chest tightness, shortness of breath, wheezing and stridor. Cardiovascular:  Negative for chest pain, palpitations and leg swelling. Gastrointestinal:  Negative for abdominal distention, abdominal pain, constipation, diarrhea, nausea and vomiting. Genitourinary:  Negative for difficulty urinating and dysuria. Skin:  Negative for rash and wound. Neurological:  Negative for dizziness, weakness and light-headedness. Psychiatric/Behavioral:  Negative for agitation and behavioral problems. Physical Exam:      Vitals: /80   Pulse 60   Temp 97.9 °F (36.6 °C)   Wt 177 lb (80.3 kg)   SpO2 97%   BMI 30.38 kg/m²     Body mass index is 30.38 kg/m². Wt Readings from Last 3 Encounters:   01/18/23 177 lb (80.3 kg)   11/08/22 186 lb (84.4 kg)   10/10/22 188 lb (85.3 kg)     Physical Exam  Vitals and nursing note reviewed. Constitutional:       General: She is not in acute distress. Appearance: Normal appearance. She is well-developed. She is not diaphoretic. HENT:      Head: Normocephalic and atraumatic. Eyes:      General: No scleral icterus. Conjunctiva/sclera: Conjunctivae normal.      Pupils: Pupils are equal, round, and reactive to light. Cardiovascular:      Rate and Rhythm: Normal rate and regular rhythm. Pulses: Normal pulses. Heart sounds: Normal heart sounds. No murmur heard. No friction rub. No gallop. Pulmonary:      Effort: Pulmonary effort is normal. No respiratory distress. Breath sounds: Normal breath sounds. No wheezing or rales. Chest:      Chest wall: No tenderness.    Abdominal:      General: Bowel sounds are normal. There is no distension. Palpations: Abdomen is soft. Musculoskeletal:         General: Tenderness present. No deformity. Normal range of motion. Cervical back: Normal range of motion and neck supple. Comments: Left arm sling in place. Skin:     General: Skin is warm and dry. Neurological:      Mental Status: She is alert and oriented to person, place, and time. Cranial Nerves: No cranial nerve deficit. Sensory: No sensory deficit. Psychiatric:         Behavior: Behavior normal.       Radiology: None  EKG: Done in the office today, normal sinus rhythm, no changes compared to previous EKG     /Plan:   Closed supracondylar fracture of left humerus with routine healing   Sustaining fall at home 4 days ago and broke her left arm. X-ray at [facility] with showed fracture of left humerus head and surgical neck  -Evaluated by orthopedics at [facility]  -Has plan for surgery on 1/25/2023  -Pain controlled with Oxycodone and tylenol per ortho  -Patient agrees with surgery    Preop examination   -Medication adjustment before surgery was discussed with patient in details  -EKG and lab work ordered as requested by surgeon     Addendum: UA shows dehydration vs ketoacidosis. Hold Jardiance starting 1/19/2023. Repeat in few days. Pending preop clearance - Discussed with Dr. Jazmyn Spence. She will follow up on repeat UA.            Known risk factors for perioperative complications: None    Pre-Operative Risk assessment using 2014 ACC/AHA guidelines     Emergent procedure NO  Active Cardiac Condition NO (decompensated HF, Arrhythmia, MI <3 weeks, severe valve disease)  Risk Level of Procedure Intermediate Risk (intraperitoneal, intrathoracic, HENT, orthopedic, or carotid endarterectomy, etc.)  Revised Cardiac Risk Index Risk factors: None  Measurement of Exercise Tolerance before Surgery >4 YES    According to the 2014 ACC/AHA pre-operative risk assessment guidelines Deloris Iniguez is a low risk for major cardiac complications during a intermediate risk procedure and may continue as planned. Specific medication recommendations are listed below. Medications recommended to continue should be taken with a sip of water even when NPO.     Further recommendations from consultants: None    Medication Recommendations:  Hold Metformin in the morning of the surgery   2. Hold Jardiance 3 days before the surgery   3. Hold Ramipril in the morning of the surgery   4. Take 7 units of Lantus the night before your surgery   Hold all NSAIDs for 7 days before surgery       1. Preoperative workup as follows: H&P, EKG, labs as requested by surgeon  2. Change in medicationregimen before surgery: See above  3. Prophylaxis for cardiac events with perioperative beta-blockers: Not indicated  4. Deep vein thrombosis prophylaxis: As per surgeon    Please note that this chart was generated using dragon dictation software.  Although every effort was made to ensure the accuracy of this automated transcription, some errors in transcription may have occurred.    Velia Knowles MD  1/20/2023

## 2023-01-19 DIAGNOSIS — R82.4 URINE KETONE: Primary | ICD-10-CM

## 2023-01-19 LAB — URINE CULTURE, ROUTINE: NORMAL

## 2023-01-19 NOTE — PROGRESS NOTES
Recent lab work was obtained as part of preop requested by Ortho for patient upcoming left arm and shoulder surgery on 1/25/2023. Labs shows evidence of euglycemic ketoacidosis with large urine ketone and mild anion gap. Patient is completely asymptomatic. Will hold Jardiance. Have patient increase hydration and low-carb diet. Repeat UA in a few days. She was advised if develops symptoms of ketoacidosis, she will go to the emergency room for evaluation.

## 2023-01-21 LAB — MRSA CULTURE ONLY: NORMAL

## 2023-01-23 ENCOUNTER — HOSPITAL ENCOUNTER (OUTPATIENT)
Age: 63
Discharge: HOME | End: 2023-01-23
Payer: COMMERCIAL

## 2023-01-23 DIAGNOSIS — R82.4 URINE KETONE: ICD-10-CM

## 2023-01-23 LAB
AMORPHOUS: NORMAL /HPF
BILIRUBIN URINE: NEGATIVE
BLOOD, URINE: NEGATIVE
CLARITY: CLEAR
COLOR: YELLOW
GLUCOSE URINE: NEGATIVE MG/DL
KETONES, URINE: NEGATIVE MG/DL
LEUKOCYTE ESTERASE, URINE: NEGATIVE
MICROSCOPIC EXAMINATION: NORMAL
NITRITE, URINE: NEGATIVE
PH UA: 5.5 (ref 5–8)
PROTEIN UA: NEGATIVE MG/DL
RBC UA: NORMAL /HPF (ref 0–4)
SPECIFIC GRAVITY UA: <=1.005 (ref 1–1.03)
URINE TYPE: NORMAL
UROBILINOGEN, URINE: 0.2 E.U./DL
WBC UA: NORMAL /HPF (ref 0–5)

## 2023-01-23 PROCEDURE — 81001 URINALYSIS AUTO W/SCOPE: CPT

## 2023-02-15 NOTE — TELEPHONE ENCOUNTER
Patient needs refill for the following:      sertraline (ZOLOFT) 50 MG tablet      ramipril (ALTACE) 10 MG capsule      Patient is now using home delivery pharmacy.     [facility] ([facility] ) - [place] 3 [phone] - F [phone]    Pls advise

## 2023-02-16 RX ORDER — RAMIPRIL 10 MG/1
CAPSULE ORAL
Qty: 90 CAPSULE | Refills: 3 | Status: SHIPPED | OUTPATIENT
Start: 2023-02-16

## 2023-02-17 PROBLEM — Z01.818 PREOP EXAMINATION: Status: RESOLVED | Noted: 2023-01-18 | Resolved: 2023-02-17

## 2023-03-24 ENCOUNTER — HOSPITAL ENCOUNTER (OUTPATIENT)
Age: 63
Discharge: HOME | End: 2023-03-24
Payer: COMMERCIAL

## 2023-03-24 LAB
25(OH)D3 SERPL-MCNC: 99.2 NG/ML
ALBUMIN SERPL-MCNC: 4.6 G/DL (ref 3.4–5)
ALBUMIN/GLOB SERPL: 2.1 {RATIO} (ref 1.1–2.2)
ALP SERPL-CCNC: 78 U/L (ref 40–129)
ALT SERPL-CCNC: 10 U/L (ref 10–40)
ANION GAP SERPL CALCULATED.3IONS-SCNC: 15 MMOL/L (ref 3–16)
AST SERPL-CCNC: 12 U/L (ref 15–37)
BILIRUB SERPL-MCNC: 0.5 MG/DL (ref 0–1)
BUN SERPL-MCNC: 20 MG/DL (ref 7–20)
CALCIUM SERPL-MCNC: 10.2 MG/DL (ref 8.3–10.6)
CHLORIDE SERPL-SCNC: 98 MMOL/L (ref 99–110)
CHOLEST SERPL-MCNC: 175 MG/DL (ref 0–199)
CO2 SERPL-SCNC: 24 MMOL/L (ref 21–32)
CREAT SERPL-MCNC: 0.5 MG/DL (ref 0.6–1.2)
CREAT UR-MCNC: 68.5 MG/DL (ref 28–259)
GFR SERPLBLD CREATININE-BSD FMLA CKD-EPI: >60 ML/MIN/{1.73_M2}
GLUCOSE SERPL-MCNC: 118 MG/DL (ref 70–99)
HDLC SERPL-MCNC: 67 MG/DL (ref 40–60)
LDLC SERPL CALC-MCNC: 92 MG/DL
MICROALBUMIN UR DL<=1MG/L-MCNC: <1.2 MG/DL
MICROALBUMIN/CREAT UR: NORMAL MG/G (ref 0–30)
POTASSIUM SERPL-SCNC: 4.1 MMOL/L (ref 3.5–5.1)
PROT SERPL-MCNC: 6.8 G/DL (ref 6.4–8.2)
SODIUM SERPL-SCNC: 137 MMOL/L (ref 136–145)
TRIGL SERPL-MCNC: 80 MG/DL (ref 0–150)
TSH SERPL DL<=0.005 MIU/L-ACNC: 1.22 UIU/ML (ref 0.27–4.2)
VIT B12 SERPL-MCNC: 1362 PG/ML (ref 211–911)
VLDLC SERPL CALC-MCNC: 16 MG/DL

## 2023-03-24 PROCEDURE — 82306 VITAMIN D 25 HYDROXY: CPT

## 2023-03-24 PROCEDURE — 83036 HEMOGLOBIN GLYCOSYLATED A1C: CPT

## 2023-03-24 PROCEDURE — 80053 COMPREHEN METABOLIC PANEL: CPT

## 2023-03-24 PROCEDURE — 84443 ASSAY THYROID STIM HORMONE: CPT

## 2023-03-24 PROCEDURE — 82043 UR ALBUMIN QUANTITATIVE: CPT

## 2023-03-24 PROCEDURE — 36415 COLL VENOUS BLD VENIPUNCTURE: CPT

## 2023-03-24 PROCEDURE — 82607 VITAMIN B-12: CPT

## 2023-03-24 PROCEDURE — 82985 ASSAY OF GLYCATED PROTEIN: CPT

## 2023-03-24 PROCEDURE — 82570 ASSAY OF URINE CREATININE: CPT

## 2023-03-24 PROCEDURE — 80061 LIPID PANEL: CPT

## 2023-03-25 LAB
EST. AVERAGE GLUCOSE BLD GHB EST-MCNC: 200.1 MG/DL
FRUCTOSAMINE SERPL-SCNC: 338 UMOL/L (ref 205–285)
HBA1C MFR BLD: 8.6 %

## 2023-04-03 ENCOUNTER — TELEPHONE (OUTPATIENT)
Dept: INTERNAL MEDICINE CLINIC | Age: 63
End: 2023-04-03

## 2023-04-03 NOTE — TELEPHONE ENCOUNTER
----- Message from [facility] sent at 4/3/2023 10:11 AM EDT -----  Subject: Appointment Request    Reason for Call: Established Patient Appointment needed: Routine Existing   Condition Follow Up    QUESTIONS    Reason for appointment request? Available appointments did not meet   patient need     Additional Information for Provider? Pt needs appt this week or next week   for refill of Xanax. Pt needs like VV. Pt would like link for VV sent   through text. Can do any day or time.  Please call with appt  ---------------------------------------------------------------------------  --------------  [id number] [employer]  [phone]; OK to leave message on voicemail  ---------------------------------------------------------------------------  --------------  SCRIPT ANSWERS  KASH Screen: Magda Marquez

## 2023-04-04 ENCOUNTER — TELEMEDICINE (OUTPATIENT)
Dept: INTERNAL MEDICINE CLINIC | Age: 63
End: 2023-04-04
Payer: COMMERCIAL

## 2023-04-04 DIAGNOSIS — F43.23 SITUATIONAL MIXED ANXIETY AND DEPRESSIVE DISORDER: Primary | ICD-10-CM

## 2023-04-04 DIAGNOSIS — Z79.4 TYPE 2 DIABETES MELLITUS WITHOUT COMPLICATION, WITH LONG-TERM CURRENT USE OF INSULIN (HCC): ICD-10-CM

## 2023-04-04 DIAGNOSIS — E11.9 TYPE 2 DIABETES MELLITUS WITHOUT COMPLICATION, WITH LONG-TERM CURRENT USE OF INSULIN (HCC): ICD-10-CM

## 2023-04-04 PROCEDURE — G8427 DOCREV CUR MEDS BY ELIG CLIN: HCPCS | Performed by: INTERNAL MEDICINE

## 2023-04-04 PROCEDURE — 3017F COLORECTAL CA SCREEN DOC REV: CPT | Performed by: INTERNAL MEDICINE

## 2023-04-04 PROCEDURE — 2022F DILAT RTA XM EVC RTNOPTHY: CPT | Performed by: INTERNAL MEDICINE

## 2023-04-04 PROCEDURE — 3052F HG A1C>EQUAL 8.0%<EQUAL 9.0%: CPT | Performed by: INTERNAL MEDICINE

## 2023-04-04 PROCEDURE — 99213 OFFICE O/P EST LOW 20 MIN: CPT | Performed by: INTERNAL MEDICINE

## 2023-04-04 RX ORDER — ALPRAZOLAM 1 MG/1
1 TABLET ORAL NIGHTLY PRN
Qty: 90 TABLET | Refills: 0 | Status: SHIPPED | OUTPATIENT
Start: 2023-04-04 | End: 2023-07-03

## 2023-04-04 SDOH — ECONOMIC STABILITY: INCOME INSECURITY: HOW HARD IS IT FOR YOU TO PAY FOR THE VERY BASICS LIKE FOOD, HOUSING, MEDICAL CARE, AND HEATING?: NOT HARD AT ALL

## 2023-04-04 SDOH — ECONOMIC STABILITY: HOUSING INSECURITY
IN THE LAST 12 MONTHS, WAS THERE A TIME WHEN YOU DID NOT HAVE A STEADY PLACE TO SLEEP OR SLEPT IN A SHELTER (INCLUDING NOW)?: NO

## 2023-04-04 SDOH — ECONOMIC STABILITY: TRANSPORTATION INSECURITY
IN THE PAST 12 MONTHS, HAS LACK OF TRANSPORTATION KEPT YOU FROM MEETINGS, WORK, OR FROM GETTING THINGS NEEDED FOR DAILY LIVING?: NO

## 2023-04-04 SDOH — ECONOMIC STABILITY: FOOD INSECURITY: WITHIN THE PAST 12 MONTHS, THE FOOD YOU BOUGHT JUST DIDN'T LAST AND YOU DIDN'T HAVE MONEY TO GET MORE.: NEVER TRUE

## 2023-04-04 SDOH — ECONOMIC STABILITY: FOOD INSECURITY: WITHIN THE PAST 12 MONTHS, YOU WORRIED THAT YOUR FOOD WOULD RUN OUT BEFORE YOU GOT MONEY TO BUY MORE.: NEVER TRUE

## 2023-04-04 NOTE — PROGRESS NOTES
Mental status: [x] Alert and awake  [x] Oriented to person/place/time [x] Able to follow commands    [] Abnormal -     HENT: [x] Normocephalic, atraumatic  [] Abnormal -   [x] Mouth/Throat: Mucous membranes are moist      Pulmonary/Chest: [x] Respiratory effort normal   [x] No visualized signs of difficulty breathing or respiratory distress        [] Abnormal -           Skin:        [x] No significant exanthematous lesions or discoloration noted on facial skin         [] Abnormal -            Psychiatric:       [x] Normal Affect [] Abnormal -        [x] No Hallucinations    Other pertinent observable physical exam findings:-             Liyah Paz, was evaluated through a synchronous (real-time) audio-video encounter. The patient (or guardian if applicable) is aware that this is a billable service, which includes applicable co-pays. This Virtual Visit was conducted with patient's (and/or legal guardian's) consent. The visit was conducted pursuant to the emergency declaration under the [address] [place] [address] authority and the [place] [email or web address] Act. Patient identification was verified, and a caregiver was present when appropriate. The patient was located at Home: [address] [place] [address]  Provider was located at [place] [address] ([address] [place], [place] [address]t): [phone] E.  [address] [place] [address] [address],  [address]         --Wong Ortega MD

## 2023-06-09 DIAGNOSIS — F43.23 SITUATIONAL MIXED ANXIETY AND DEPRESSIVE DISORDER: ICD-10-CM

## 2023-06-09 RX ORDER — ALPRAZOLAM 1 MG/1
1 TABLET ORAL NIGHTLY PRN
Qty: 30 TABLET | Refills: 0 | Status: SHIPPED | OUTPATIENT
Start: 2023-06-09 | End: 2023-07-09

## 2023-06-09 NOTE — TELEPHONE ENCOUNTER
ALPRAZolam Norrine May) 1 MG tablet    [facility] [id number] - [place], [address] [place] [address]   Phone:  [phone]  Fax:  [phone]     Pt received first 30 pills at local pharmacy, her second round of 30 pills was received down in [place] while on vacation. While picking up th refill in [place] pt was told that the last 30 pills were not transferable from [place] back to [place] and to call PCP. Pt wants to know in order to receive last 30 pills what does she need to do?

## 2023-06-29 DIAGNOSIS — F43.23 SITUATIONAL MIXED ANXIETY AND DEPRESSIVE DISORDER: ICD-10-CM

## 2023-06-29 RX ORDER — ALPRAZOLAM 1 MG/1
1 TABLET ORAL NIGHTLY PRN
Qty: 30 TABLET | Refills: 0 | Status: SHIPPED | OUTPATIENT
Start: 2023-06-29 | End: 2023-07-29

## 2023-07-03 ENCOUNTER — TELEMEDICINE (OUTPATIENT)
Dept: INTERNAL MEDICINE CLINIC | Age: 63
End: 2023-07-03
Payer: COMMERCIAL

## 2023-07-03 DIAGNOSIS — F43.23 SITUATIONAL MIXED ANXIETY AND DEPRESSIVE DISORDER: Primary | ICD-10-CM

## 2023-07-03 PROCEDURE — 99213 OFFICE O/P EST LOW 20 MIN: CPT | Performed by: INTERNAL MEDICINE

## 2023-07-03 PROCEDURE — G8427 DOCREV CUR MEDS BY ELIG CLIN: HCPCS | Performed by: INTERNAL MEDICINE

## 2023-07-03 PROCEDURE — 3017F COLORECTAL CA SCREEN DOC REV: CPT | Performed by: INTERNAL MEDICINE

## 2023-07-03 RX ORDER — ALPRAZOLAM 1 MG/1
1 TABLET ORAL NIGHTLY PRN
Qty: 90 TABLET | Refills: 0 | Status: SHIPPED | OUTPATIENT
Start: 2023-07-03 | End: 2023-10-01

## 2023-07-03 RX ORDER — FEXOFENADINE HCL 180 MG/1
180 TABLET ORAL DAILY
COMMUNITY

## 2023-07-03 ASSESSMENT — PATIENT HEALTH QUESTIONNAIRE - PHQ9
SUM OF ALL RESPONSES TO PHQ QUESTIONS 1-9: 0
SUM OF ALL RESPONSES TO PHQ QUESTIONS 1-9: 0
SUM OF ALL RESPONSES TO PHQ9 QUESTIONS 1 & 2: 0
1. LITTLE INTEREST OR PLEASURE IN DOING THINGS: 0
SUM OF ALL RESPONSES TO PHQ QUESTIONS 1-9: 0
SUM OF ALL RESPONSES TO PHQ QUESTIONS 1-9: 0
2. FEELING DOWN, DEPRESSED OR HOPELESS: 0

## 2023-08-22 ENCOUNTER — HOSPITAL ENCOUNTER (OUTPATIENT)
Dept: CT IMAGING | Age: 63
Discharge: HOME | End: 2023-08-22
Attending: INTERNAL MEDICINE
Payer: COMMERCIAL

## 2023-08-22 DIAGNOSIS — R91.1 PULMONARY NODULE 1 CM OR GREATER IN DIAMETER: ICD-10-CM

## 2023-08-22 PROCEDURE — 71250 CT THORAX DX C-: CPT

## 2023-08-30 ENCOUNTER — OFFICE VISIT (OUTPATIENT)
Dept: PULMONOLOGY | Age: 63
End: 2023-08-30
Payer: COMMERCIAL

## 2023-08-30 VITALS
BODY MASS INDEX: 28.49 KG/M2 | RESPIRATION RATE: 16 BRPM | OXYGEN SATURATION: 95 % | DIASTOLIC BLOOD PRESSURE: 70 MMHG | SYSTOLIC BLOOD PRESSURE: 130 MMHG | HEART RATE: 96 BPM | WEIGHT: 171 LBS | HEIGHT: 65 IN

## 2023-08-30 DIAGNOSIS — R91.1 PULMONARY NODULE 1 CM OR GREATER IN DIAMETER: Primary | ICD-10-CM

## 2023-08-30 PROCEDURE — G8427 DOCREV CUR MEDS BY ELIG CLIN: HCPCS | Performed by: INTERNAL MEDICINE

## 2023-08-30 PROCEDURE — G8417 CALC BMI ABV UP PARAM F/U: HCPCS | Performed by: INTERNAL MEDICINE

## 2023-08-30 PROCEDURE — 99213 OFFICE O/P EST LOW 20 MIN: CPT | Performed by: INTERNAL MEDICINE

## 2023-08-30 PROCEDURE — 3078F DIAST BP <80 MM HG: CPT | Performed by: INTERNAL MEDICINE

## 2023-08-30 PROCEDURE — 3075F SYST BP GE 130 - 139MM HG: CPT | Performed by: INTERNAL MEDICINE

## 2023-08-30 PROCEDURE — 3017F COLORECTAL CA SCREEN DOC REV: CPT | Performed by: INTERNAL MEDICINE

## 2023-08-30 PROCEDURE — 1036F TOBACCO NON-USER: CPT | Performed by: INTERNAL MEDICINE

## 2023-08-30 NOTE — PROGRESS NOTES
[facility]    Outpatient follow-up note    Subjective:   Referring Physician: Rodrick Ellsworth MD  [address] / HPI:     The patient is 59 y.o. female who presents today for a follow-up visit for abnormal CT chest. No new complaints. Since last visit she tripped on a step and broke her left shoulder requiring surgery. She got her follow up CT last week and is here to go over results. Interval history:  Since last visit she did have an episode of syncope while trying to get out of bed and broke her knee cap. Initial visit: Patient is a never smoker who had a lesion in her lungs come to medical attention after a series of falls. Patient was vacationing with friends in May 2020 when she slipped and fell and fractured her vertebrae. She had to have spine stabilization surgery and while she was rehabbing from that she fell again on her backside and repeat imaging was performed including of the chest.  At that time her chest CT showed multiple sub-5 cm pulmonary nodules but 1 nodule in the superior segment of her right lower lobe that was roughly 2 cm in size. It was thought to potentially be infectious so she was treated with antibiotics and a repeat scan was done in December 2020. There was no significant change. A further follow-up scan was performed in October of this year and the lesion persisted. A PET scan was ordered but was refused by her insurance company and she was referred to me for further evaluation. Patient has no complaints of shortness of breath or cough but is concerned as to what this thing is and why it is there. Past Medical History:    Past Medical History:   Diagnosis Date    Allergic rhinitis     Asthma     H/O- stress related    Diabetes mellitus, type 1     Fibroid     History of blood transfusion 2001?     Hyperlipidemia     Menopause ovarian failure     Other acute reactions to stress     Pancreatitis 2000    Rectal abscess     II

## 2023-10-02 ENCOUNTER — TELEMEDICINE (OUTPATIENT)
Dept: INTERNAL MEDICINE CLINIC | Age: 63
End: 2023-10-02
Payer: COMMERCIAL

## 2023-10-02 DIAGNOSIS — Z79.4 TYPE 2 DIABETES MELLITUS WITHOUT COMPLICATION, WITH LONG-TERM CURRENT USE OF INSULIN (HCC): ICD-10-CM

## 2023-10-02 DIAGNOSIS — F43.23 SITUATIONAL MIXED ANXIETY AND DEPRESSIVE DISORDER: Primary | ICD-10-CM

## 2023-10-02 DIAGNOSIS — E11.9 TYPE 2 DIABETES MELLITUS WITHOUT COMPLICATION, WITH LONG-TERM CURRENT USE OF INSULIN (HCC): ICD-10-CM

## 2023-10-02 PROCEDURE — 2022F DILAT RTA XM EVC RTNOPTHY: CPT | Performed by: INTERNAL MEDICINE

## 2023-10-02 PROCEDURE — 99213 OFFICE O/P EST LOW 20 MIN: CPT | Performed by: INTERNAL MEDICINE

## 2023-10-02 PROCEDURE — 3052F HG A1C>EQUAL 8.0%<EQUAL 9.0%: CPT | Performed by: INTERNAL MEDICINE

## 2023-10-02 PROCEDURE — 3017F COLORECTAL CA SCREEN DOC REV: CPT | Performed by: INTERNAL MEDICINE

## 2023-10-02 PROCEDURE — G8427 DOCREV CUR MEDS BY ELIG CLIN: HCPCS | Performed by: INTERNAL MEDICINE

## 2023-10-02 RX ORDER — ALPRAZOLAM 1 MG/1
1 TABLET ORAL NIGHTLY PRN
Qty: 90 TABLET | Refills: 0 | Status: SHIPPED | OUTPATIENT
Start: 2023-10-02 | End: 2023-12-31

## 2023-10-02 NOTE — PROGRESS NOTES
Megha Almaraz, was evaluated through a synchronous (real-time) audio-video encounter. The patient (or guardian if applicable) is aware that this is a billable service, which includes applicable co-pays. This Virtual Visit was conducted with patient's (and/or legal guardian's) consent. Patient identification was verified, and a caregiver was present when appropriate. The patient was located at Home: [address] [place] [address]  Provider was located at [address]): [phone] E. [address] [place] [address], [address] [place] [address]      Megha Almaraz (:  1960) is a Established patient, presenting virtually for evaluation of the following:    Assessment & Plan   Below is the assessment and plan developed based on review of pertinent history, physical exam, labs, studies, and medications. 1. Situational mixed anxiety and depressive disorder  -Stable. OARRS reviewed, no concerns. Discussed option to see the clinic psychologist as well for sleep if stress seems to be impacting it more. -     ALPRAZolam (XANAX) 1 MG tablet; Take 1 tablet by mouth nightly as needed for Sleep for up to 90 days. Max Daily Amount: 1 mg, Disp-90 tablet, R-0Normal  2. Type 2 diabetes mellitus without complication, with long-term current use of insulin (HCC)  -Followed by Endo, right now she is not using a CGM but coverage has been much better over recent years, she may discuss this with her endocrinologist at the next appointment. Return in about 3 months (around 2024). Subjective   HPI    Anxiety is about stable. She is having more stress with work right now, they are transitioning to a new system, so she is having more trouble sleeping. Alprazolam is still working well. She has not had any side effects. Will have diabetes follow up with endo soon. She is just on Lantus now. Review of Systems       Objective   Patient-Reported Vitals  No data recorded     Physical Exam  Vitals reviewed.

## 2023-11-03 ENCOUNTER — HOSPITAL ENCOUNTER (OUTPATIENT)
Dept: MAMMOGRAPHY | Age: 63
Discharge: HOME | End: 2023-11-03
Payer: COMMERCIAL

## 2023-11-03 VITALS — WEIGHT: 177 LBS | BODY MASS INDEX: 30.22 KG/M2 | HEIGHT: 64 IN

## 2023-11-03 DIAGNOSIS — Z12.31 VISIT FOR SCREENING MAMMOGRAM: ICD-10-CM

## 2023-11-03 PROCEDURE — 77063 BREAST TOMOSYNTHESIS BI: CPT

## 2023-11-15 LAB
ESTIMATED AVERAGE GLUCOSE: 200
HBA1C MFR BLD: 8.6 %

## 2023-12-07 RX ORDER — RAMIPRIL 10 MG/1
CAPSULE ORAL
Qty: 90 CAPSULE | Refills: 3 | Status: SHIPPED | OUTPATIENT
Start: 2023-12-07

## 2023-12-26 ENCOUNTER — TELEMEDICINE (OUTPATIENT)
Dept: INTERNAL MEDICINE CLINIC | Age: 63
End: 2023-12-26
Payer: COMMERCIAL

## 2023-12-26 DIAGNOSIS — Z79.4 TYPE 2 DIABETES MELLITUS WITHOUT COMPLICATION, WITH LONG-TERM CURRENT USE OF INSULIN (HCC): ICD-10-CM

## 2023-12-26 DIAGNOSIS — F43.23 SITUATIONAL MIXED ANXIETY AND DEPRESSIVE DISORDER: Primary | ICD-10-CM

## 2023-12-26 DIAGNOSIS — E11.9 TYPE 2 DIABETES MELLITUS WITHOUT COMPLICATION, WITH LONG-TERM CURRENT USE OF INSULIN (HCC): ICD-10-CM

## 2023-12-26 PROCEDURE — 3017F COLORECTAL CA SCREEN DOC REV: CPT | Performed by: INTERNAL MEDICINE

## 2023-12-26 PROCEDURE — 99213 OFFICE O/P EST LOW 20 MIN: CPT | Performed by: INTERNAL MEDICINE

## 2023-12-26 PROCEDURE — 3052F HG A1C>EQUAL 8.0%<EQUAL 9.0%: CPT | Performed by: INTERNAL MEDICINE

## 2023-12-26 PROCEDURE — G8427 DOCREV CUR MEDS BY ELIG CLIN: HCPCS | Performed by: INTERNAL MEDICINE

## 2023-12-26 PROCEDURE — 2022F DILAT RTA XM EVC RTNOPTHY: CPT | Performed by: INTERNAL MEDICINE

## 2023-12-26 RX ORDER — GLIMEPIRIDE 2 MG/1
2 TABLET ORAL
COMMUNITY

## 2023-12-26 RX ORDER — ALPRAZOLAM 1 MG/1
1 TABLET ORAL NIGHTLY PRN
Qty: 90 TABLET | Refills: 0 | Status: SHIPPED | OUTPATIENT
Start: 2023-12-26 | End: 2024-03-25

## 2023-12-26 NOTE — PROGRESS NOTES
Iris Valle, was evaluated through a synchronous (real-time) audio-video encounter. The patient (or guardian if applicable) is aware that this is a billable service, which includes applicable co-pays. This Virtual Visit was conducted with patient's (and/or legal guardian's) consent. Patient identification was verified, and a caregiver was present when appropriate. The patient was located at Home: [address] [place] [address]  Provider was located at [facility] ([address] [place] [address]): [phone] E. [address] [place] [address], [address] [place] [address],  [facility]      Iris Valle (:  1960) is a Established patient, presenting virtually for evaluation of the following:    Assessment & Plan   Below is the assessment and plan developed based on review of pertinent history, physical exam, labs, studies, and medications. 1. Situational mixed anxiety and depressive disorder  -Stable. Continue current medications. OARRS reviewed, no concerns for misuse or abuse. -     ALPRAZolam (XANAX) 1 MG tablet; Take 1 tablet by mouth nightly as needed for Sleep for up to 90 days. Max Daily Amount: 1 mg, Disp-90 tablet, R-0Normal  2. Type 2 diabetes mellitus without complication, with long-term current use of insulin (HCC)   -Uncontrolled, seeing Endo. Continue current medications. Could find a CGM to be helpful if her insurance covers it. Return in about 3 months (around 3/26/2024) for sleep/anxiety. Subjective   HPI    Anxiety stable. She uses alprazolam at night for sleep, no side effects. Still taking sertraline, no change. The endocrinologist added glimepiride to her regimen at the last appointment, A1c remains over 8. She has difficulty getting it under control. Still taking Jardiance, metformin, and Lantus 18 units once a day. Review of Systems       Objective   Patient-Reported Vitals  No data recorded     Physical Exam  Vitals reviewed. Constitutional:       General: She is not in acute distress.      Appearance:

## 2024-01-16 NOTE — TELEPHONE ENCOUNTER
-- DO NOT REPLY / DO NOT REPLY ALL --  -- Message is from [employer] [facility] [employer] ([facility]) --    General Patient Message: Physical therapist is calling regarding  Donta heart rate is 117 and blood pressure is 150/68  would like someone to call her back     Caller Information         Type Contact Phone/Fax    01/16/2024 03:24 PM CST Phone (Incoming) [place] (Other) [phone]          Alternative phone number: n/a    Can a detailed message be left? Yes    Message Turnaround:                I think she just need BUN/Creatinine

## 2024-02-19 NOTE — TELEPHONE ENCOUNTER
- medication refilled by provider  - med tab updated to reflect this     Would she be able to do a video visit so I can see the rash early afternoon?  I could squeeze her in

## 2024-03-22 ENCOUNTER — TELEMEDICINE (OUTPATIENT)
Dept: INTERNAL MEDICINE CLINIC | Age: 64
End: 2024-03-22
Payer: COMMERCIAL

## 2024-03-22 DIAGNOSIS — Z79.4 TYPE 2 DIABETES MELLITUS WITHOUT COMPLICATION, WITH LONG-TERM CURRENT USE OF INSULIN (HCC): ICD-10-CM

## 2024-03-22 DIAGNOSIS — F43.23 SITUATIONAL MIXED ANXIETY AND DEPRESSIVE DISORDER: Primary | ICD-10-CM

## 2024-03-22 DIAGNOSIS — E11.9 TYPE 2 DIABETES MELLITUS WITHOUT COMPLICATION, WITH LONG-TERM CURRENT USE OF INSULIN (HCC): ICD-10-CM

## 2024-03-22 PROCEDURE — 99213 OFFICE O/P EST LOW 20 MIN: CPT | Performed by: INTERNAL MEDICINE

## 2024-03-22 PROCEDURE — 2022F DILAT RTA XM EVC RTNOPTHY: CPT | Performed by: INTERNAL MEDICINE

## 2024-03-22 PROCEDURE — 3017F COLORECTAL CA SCREEN DOC REV: CPT | Performed by: INTERNAL MEDICINE

## 2024-03-22 PROCEDURE — 3046F HEMOGLOBIN A1C LEVEL >9.0%: CPT | Performed by: INTERNAL MEDICINE

## 2024-03-22 PROCEDURE — G8427 DOCREV CUR MEDS BY ELIG CLIN: HCPCS | Performed by: INTERNAL MEDICINE

## 2024-03-22 RX ORDER — GABAPENTIN 100 MG/1
100 CAPSULE ORAL 3 TIMES DAILY
COMMUNITY

## 2024-03-22 RX ORDER — ALPRAZOLAM 1 MG/1
1 TABLET ORAL NIGHTLY PRN
Qty: 90 TABLET | Refills: 0 | Status: SHIPPED | OUTPATIENT
Start: 2024-03-22 | End: 2024-06-20

## 2024-03-22 ASSESSMENT — PATIENT HEALTH QUESTIONNAIRE - PHQ9
SUM OF ALL RESPONSES TO PHQ QUESTIONS 1-9: 0
SUM OF ALL RESPONSES TO PHQ QUESTIONS 1-9: 0
6. FEELING BAD ABOUT YOURSELF - OR THAT YOU ARE A FAILURE OR HAVE LET YOURSELF OR YOUR FAMILY DOWN: NOT AT ALL
SUM OF ALL RESPONSES TO PHQ QUESTIONS 1-9: 0
9. THOUGHTS THAT YOU WOULD BE BETTER OFF DEAD, OR OF HURTING YOURSELF: NOT AT ALL
1. LITTLE INTEREST OR PLEASURE IN DOING THINGS: NOT AT ALL
4. FEELING TIRED OR HAVING LITTLE ENERGY: NOT AT ALL
SUM OF ALL RESPONSES TO PHQ9 QUESTIONS 1 & 2: 0
2. FEELING DOWN, DEPRESSED OR HOPELESS: NOT AT ALL
10. IF YOU CHECKED OFF ANY PROBLEMS, HOW DIFFICULT HAVE THESE PROBLEMS MADE IT FOR YOU TO DO YOUR WORK, TAKE CARE OF THINGS AT HOME, OR GET ALONG WITH OTHER PEOPLE: NOT DIFFICULT AT ALL
8. MOVING OR SPEAKING SO SLOWLY THAT OTHER PEOPLE COULD HAVE NOTICED. OR THE OPPOSITE, BEING SO FIGETY OR RESTLESS THAT YOU HAVE BEEN MOVING AROUND A LOT MORE THAN USUAL: NOT AT ALL
5. POOR APPETITE OR OVEREATING: NOT AT ALL
7. TROUBLE CONCENTRATING ON THINGS, SUCH AS READING THE NEWSPAPER OR WATCHING TELEVISION: NOT AT ALL
3. TROUBLE FALLING OR STAYING ASLEEP: NOT AT ALL
SUM OF ALL RESPONSES TO PHQ QUESTIONS 1-9: 0

## 2024-03-22 NOTE — PROGRESS NOTES
Shakira ROSE Jazlyn, was evaluated through a synchronous (real-time) audio-video encounter. The patient (or guardian if applicable) is aware that this is a billable service, which includes applicable co-pays. This Virtual Visit was conducted with patient's (and/or legal guardian's) consent. Patient identification was verified, and a caregiver was present when appropriate.   The patient was located at Home: [address] [place] [address] [place] [address]  Provider was located at Facility (Appt Dept): [address] [place] [address], [address]  [place] [address]      Shakira Hobson (:  1960) is a Established patient, presenting virtually for evaluation of the following:    Assessment & Plan   Below is the assessment and plan developed based on review of pertinent history, physical exam, labs, studies, and medications.  1. Situational mixed anxiety and depressive disorder  -Stable.  OARRS reviewed, no concerns for seizure abuse  -     ALPRAZolam (XANAX) 1 MG tablet; Take 1 tablet by mouth nightly as needed for Sleep for up to 90 days. Max Daily Amount: 1 mg, Disp-90 tablet, R-0Normal  2. Type 2 diabetes mellitus without complication, with long-term current use of insulin ([facility])   -Seeing Juaquin, has had some improvement with addition of glimepiride to her regimen    Return in about 3 months (around 2024) for sleep/anxiety.       Subjective   HPI    Anxiety stable.  She takes alprazolam for sleep at night, no side effects noted.  She has felt like she needed it during the day a couple of times but has not taken it during the day.    December had eye exam - noted some hemorrhaging in the eyes, was referred to [facility] and has had some injections and will be scheduled for laser surgery.    Back has been worsening - saw Dr. Watts, was referred to Dr. Swift who injected steroids on both sides yesterday.     Glimepiride has helped with sugars. Has noticed a small amount of weight gain.     Review of Systems       Objective

## 2024-05-03 NOTE — ADDENDUM NOTE
Addended by: Martine Chris on: 4/19/2021 01:49 PM     Modules accepted: Orders
Addended by: Michelle Murry on: 4/19/2021 01:40 PM     Modules accepted: Orders
No

## 2024-05-30 ENCOUNTER — TELEMEDICINE (OUTPATIENT)
Dept: INTERNAL MEDICINE CLINIC | Age: 64
End: 2024-05-30
Payer: COMMERCIAL

## 2024-05-30 DIAGNOSIS — G89.29 CHRONIC BILATERAL LOW BACK PAIN, UNSPECIFIED WHETHER SCIATICA PRESENT: ICD-10-CM

## 2024-05-30 DIAGNOSIS — Z79.4 TYPE 2 DIABETES MELLITUS WITHOUT COMPLICATION, WITH LONG-TERM CURRENT USE OF INSULIN (HCC): ICD-10-CM

## 2024-05-30 DIAGNOSIS — F43.23 SITUATIONAL MIXED ANXIETY AND DEPRESSIVE DISORDER: Primary | ICD-10-CM

## 2024-05-30 DIAGNOSIS — M54.50 CHRONIC BILATERAL LOW BACK PAIN, UNSPECIFIED WHETHER SCIATICA PRESENT: ICD-10-CM

## 2024-05-30 DIAGNOSIS — E11.9 TYPE 2 DIABETES MELLITUS WITHOUT COMPLICATION, WITH LONG-TERM CURRENT USE OF INSULIN (HCC): ICD-10-CM

## 2024-05-30 PROBLEM — S42.412D CLOSED SUPRACONDYLAR FRACTURE OF LEFT HUMERUS WITH ROUTINE HEALING: Status: RESOLVED | Noted: 2023-01-18 | Resolved: 2024-05-30

## 2024-05-30 PROCEDURE — 99214 OFFICE O/P EST MOD 30 MIN: CPT | Performed by: INTERNAL MEDICINE

## 2024-05-30 PROCEDURE — 3046F HEMOGLOBIN A1C LEVEL >9.0%: CPT | Performed by: INTERNAL MEDICINE

## 2024-05-30 PROCEDURE — G8427 DOCREV CUR MEDS BY ELIG CLIN: HCPCS | Performed by: INTERNAL MEDICINE

## 2024-05-30 PROCEDURE — 3017F COLORECTAL CA SCREEN DOC REV: CPT | Performed by: INTERNAL MEDICINE

## 2024-05-30 PROCEDURE — 2022F DILAT RTA XM EVC RTNOPTHY: CPT | Performed by: INTERNAL MEDICINE

## 2024-05-30 SDOH — ECONOMIC STABILITY: FOOD INSECURITY: WITHIN THE PAST 12 MONTHS, YOU WORRIED THAT YOUR FOOD WOULD RUN OUT BEFORE YOU GOT MONEY TO BUY MORE.: NEVER TRUE

## 2024-05-30 SDOH — ECONOMIC STABILITY: FOOD INSECURITY: WITHIN THE PAST 12 MONTHS, THE FOOD YOU BOUGHT JUST DIDN'T LAST AND YOU DIDN'T HAVE MONEY TO GET MORE.: NEVER TRUE

## 2024-05-30 SDOH — ECONOMIC STABILITY: INCOME INSECURITY: HOW HARD IS IT FOR YOU TO PAY FOR THE VERY BASICS LIKE FOOD, HOUSING, MEDICAL CARE, AND HEATING?: NOT HARD AT ALL

## 2024-05-30 ASSESSMENT — PATIENT HEALTH QUESTIONNAIRE - PHQ9
SUM OF ALL RESPONSES TO PHQ QUESTIONS 1-9: 0
10. IF YOU CHECKED OFF ANY PROBLEMS, HOW DIFFICULT HAVE THESE PROBLEMS MADE IT FOR YOU TO DO YOUR WORK, TAKE CARE OF THINGS AT HOME, OR GET ALONG WITH OTHER PEOPLE: NOT DIFFICULT AT ALL
1. LITTLE INTEREST OR PLEASURE IN DOING THINGS: NOT AT ALL
8. MOVING OR SPEAKING SO SLOWLY THAT OTHER PEOPLE COULD HAVE NOTICED. OR THE OPPOSITE, BEING SO FIGETY OR RESTLESS THAT YOU HAVE BEEN MOVING AROUND A LOT MORE THAN USUAL: NOT AT ALL
SUM OF ALL RESPONSES TO PHQ QUESTIONS 1-9: 0
5. POOR APPETITE OR OVEREATING: NOT AT ALL
4. FEELING TIRED OR HAVING LITTLE ENERGY: NOT AT ALL
3. TROUBLE FALLING OR STAYING ASLEEP: NOT AT ALL
SUM OF ALL RESPONSES TO PHQ QUESTIONS 1-9: 0
6. FEELING BAD ABOUT YOURSELF - OR THAT YOU ARE A FAILURE OR HAVE LET YOURSELF OR YOUR FAMILY DOWN: NOT AT ALL
2. FEELING DOWN, DEPRESSED OR HOPELESS: NOT AT ALL
7. TROUBLE CONCENTRATING ON THINGS, SUCH AS READING THE NEWSPAPER OR WATCHING TELEVISION: NOT AT ALL
SUM OF ALL RESPONSES TO PHQ QUESTIONS 1-9: 0
9. THOUGHTS THAT YOU WOULD BE BETTER OFF DEAD, OR OF HURTING YOURSELF: NOT AT ALL
SUM OF ALL RESPONSES TO PHQ9 QUESTIONS 1 & 2: 0

## 2024-05-30 NOTE — PROGRESS NOTES
next week, she never had any change in vision so not sure if it worked yet.     Low back was bothering her, went back to Dr. Watts. She had two MRIs, surgery sites looked ok, so thought it was arthritis. She had two injections with Dr. Swift, so far the back is feeling worse. Taking gabapentin, maybe helps a bit.     Since the addition of glimepiride sugar has been usually 120s or lower in the morning.  She has blood work coming up for the next visit with her endocrinologist.    Anxiety is stable.  Still taking sertraline 50 mg daily, she takes alprazolam at night for sleep.  She has had trouble sleeping without it.  No side effects, denies any sedation, confusion, fatigue.    Review of Systems       Objective   Patient-Reported Vitals  Patient-Reported Systolic (Top): 118 mmHg  Patient-Reported Diastolic (Bottom): 73 mmHg  Patient-Reported Weight: 188 lbs  Patient-Reported Height: 5ft 4in       Physical Exam  Vitals reviewed.   Constitutional:       General: She is not in acute distress.     Appearance: Normal appearance. She is well-developed.   HENT:      Head: Normocephalic and atraumatic.   Pulmonary:      Effort: Pulmonary effort is normal. No respiratory distress.   Skin:     General: Skin is warm and dry.   Neurological:      Mental Status: She is alert.   Psychiatric:         Behavior: Behavior normal.         Thought Content: Thought content normal.         Judgment: Judgment normal.                  --Beverley Miller MD

## 2024-06-18 LAB
ESTIMATED AVERAGE GLUCOSE: 183
HBA1C MFR BLD: 8 %

## 2024-06-24 DIAGNOSIS — F43.23 SITUATIONAL MIXED ANXIETY AND DEPRESSIVE DISORDER: ICD-10-CM

## 2024-06-24 NOTE — TELEPHONE ENCOUNTER
REFILL    ALPRAZolam (XANAX) 1 MG tablet     [facility] [id number] - [place] [address] [address] - [place] [phone] - F [phone]

## 2024-06-25 RX ORDER — ALPRAZOLAM 1 MG/1
1 TABLET ORAL NIGHTLY PRN
Qty: 90 TABLET | Refills: 0 | Status: SHIPPED | OUTPATIENT
Start: 2024-06-25 | End: 2024-09-23

## 2024-07-09 ENCOUNTER — TELEPHONE (OUTPATIENT)
Dept: INTERNAL MEDICINE CLINIC | Age: 64
End: 2024-07-09

## 2024-07-09 NOTE — TELEPHONE ENCOUNTER
Pt has NTP appt scheduled with Dr. Knowles on 10/3/24 (no sooner appts available) but will need a refill on ALPRAZolam (XANAX) 1 MG tablet [[id number]] on 9/23/24.      Please call pt if she needs to or can be seen sooner for a refill.            [facility] [id number] [address] - [place] [phone] - F [phone] [[id number]]

## 2024-09-16 ENCOUNTER — SCHEDULED TELEPHONE ENCOUNTER (OUTPATIENT)
Dept: PULMONOLOGY | Age: 64
End: 2024-09-16
Payer: COMMERCIAL

## 2024-09-16 DIAGNOSIS — R91.1 PULMONARY NODULE 1 CM OR GREATER IN DIAMETER: Primary | ICD-10-CM

## 2024-09-16 PROCEDURE — 99213 OFFICE O/P EST LOW 20 MIN: CPT | Performed by: INTERNAL MEDICINE

## 2024-09-20 LAB
ESTIMATED AVERAGE GLUCOSE: 180
HBA1C MFR BLD: 7.8 %

## 2024-09-23 ENCOUNTER — OFFICE VISIT (OUTPATIENT)
Dept: INTERNAL MEDICINE CLINIC | Age: 64
End: 2024-09-23
Payer: COMMERCIAL

## 2024-09-23 VITALS
TEMPERATURE: 98.1 F | SYSTOLIC BLOOD PRESSURE: 120 MMHG | BODY MASS INDEX: 29.19 KG/M2 | HEIGHT: 64 IN | WEIGHT: 171 LBS | OXYGEN SATURATION: 97 % | DIASTOLIC BLOOD PRESSURE: 70 MMHG | HEART RATE: 95 BPM

## 2024-09-23 DIAGNOSIS — F51.04 PSYCHOPHYSIOLOGICAL INSOMNIA: ICD-10-CM

## 2024-09-23 DIAGNOSIS — E11.9 TYPE 2 DIABETES MELLITUS WITHOUT COMPLICATION, WITH LONG-TERM CURRENT USE OF INSULIN (HCC): Primary | ICD-10-CM

## 2024-09-23 DIAGNOSIS — Z79.4 TYPE 2 DIABETES MELLITUS WITHOUT COMPLICATION, WITH LONG-TERM CURRENT USE OF INSULIN (HCC): Primary | ICD-10-CM

## 2024-09-23 DIAGNOSIS — F41.9 ANXIETY: ICD-10-CM

## 2024-09-23 DIAGNOSIS — F51.01 PRIMARY INSOMNIA: ICD-10-CM

## 2024-09-23 DIAGNOSIS — E78.49 OTHER HYPERLIPIDEMIA: ICD-10-CM

## 2024-09-23 DIAGNOSIS — I10 PRIMARY HYPERTENSION: ICD-10-CM

## 2024-09-23 DIAGNOSIS — E66.09 CLASS 1 OBESITY DUE TO EXCESS CALORIES WITH SERIOUS COMORBIDITY AND BODY MASS INDEX (BMI) OF 31.0 TO 31.9 IN ADULT: ICD-10-CM

## 2024-09-23 PROCEDURE — G8417 CALC BMI ABV UP PARAM F/U: HCPCS | Performed by: STUDENT IN AN ORGANIZED HEALTH CARE EDUCATION/TRAINING PROGRAM

## 2024-09-23 PROCEDURE — G8427 DOCREV CUR MEDS BY ELIG CLIN: HCPCS | Performed by: STUDENT IN AN ORGANIZED HEALTH CARE EDUCATION/TRAINING PROGRAM

## 2024-09-23 PROCEDURE — 3046F HEMOGLOBIN A1C LEVEL >9.0%: CPT | Performed by: STUDENT IN AN ORGANIZED HEALTH CARE EDUCATION/TRAINING PROGRAM

## 2024-09-23 PROCEDURE — 1036F TOBACCO NON-USER: CPT | Performed by: STUDENT IN AN ORGANIZED HEALTH CARE EDUCATION/TRAINING PROGRAM

## 2024-09-23 PROCEDURE — 3017F COLORECTAL CA SCREEN DOC REV: CPT | Performed by: STUDENT IN AN ORGANIZED HEALTH CARE EDUCATION/TRAINING PROGRAM

## 2024-09-23 PROCEDURE — G2211 COMPLEX E/M VISIT ADD ON: HCPCS | Performed by: STUDENT IN AN ORGANIZED HEALTH CARE EDUCATION/TRAINING PROGRAM

## 2024-09-23 PROCEDURE — 3074F SYST BP LT 130 MM HG: CPT | Performed by: STUDENT IN AN ORGANIZED HEALTH CARE EDUCATION/TRAINING PROGRAM

## 2024-09-23 PROCEDURE — 99214 OFFICE O/P EST MOD 30 MIN: CPT | Performed by: STUDENT IN AN ORGANIZED HEALTH CARE EDUCATION/TRAINING PROGRAM

## 2024-09-23 PROCEDURE — 2022F DILAT RTA XM EVC RTNOPTHY: CPT | Performed by: STUDENT IN AN ORGANIZED HEALTH CARE EDUCATION/TRAINING PROGRAM

## 2024-09-23 PROCEDURE — 3078F DIAST BP <80 MM HG: CPT | Performed by: STUDENT IN AN ORGANIZED HEALTH CARE EDUCATION/TRAINING PROGRAM

## 2024-09-23 RX ORDER — SEMAGLUTIDE 1.34 MG/ML
0.5 INJECTION, SOLUTION SUBCUTANEOUS
COMMUNITY

## 2024-09-23 RX ORDER — TRAZODONE HYDROCHLORIDE 50 MG/1
TABLET, FILM COATED ORAL
Qty: 30 TABLET | Refills: 5 | Status: SHIPPED | OUTPATIENT
Start: 2024-09-23

## 2024-09-23 RX ORDER — ALPRAZOLAM 1 MG
1 TABLET ORAL NIGHTLY PRN
Qty: 90 TABLET | Refills: 0 | Status: CANCELLED | OUTPATIENT
Start: 2024-09-23 | End: 2024-12-22

## 2024-09-24 ENCOUNTER — TELEPHONE (OUTPATIENT)
Dept: INTERNAL MEDICINE CLINIC | Age: 64
End: 2024-09-24

## 2024-09-24 RX ORDER — ALPRAZOLAM 0.5 MG
0.5 TABLET ORAL NIGHTLY PRN
Qty: 30 TABLET | Refills: 0 | Status: SHIPPED | OUTPATIENT
Start: 2024-09-24 | End: 2024-10-24

## 2024-10-17 ENCOUNTER — TELEMEDICINE (OUTPATIENT)
Dept: INTERNAL MEDICINE CLINIC | Age: 64
End: 2024-10-17

## 2024-10-17 DIAGNOSIS — F51.04 PSYCHOPHYSIOLOGICAL INSOMNIA: Primary | ICD-10-CM

## 2024-10-17 DIAGNOSIS — F41.9 ANXIETY: ICD-10-CM

## 2024-10-17 RX ORDER — ASPIRIN 81 MG/1
81 TABLET ORAL DAILY
COMMUNITY

## 2024-10-17 RX ORDER — TRAZODONE HYDROCHLORIDE 50 MG/1
TABLET, FILM COATED ORAL
Qty: 60 TABLET | Refills: 1 | Status: SHIPPED | OUTPATIENT
Start: 2024-10-17

## 2024-10-17 ASSESSMENT — ENCOUNTER SYMPTOMS
APNEA: 0
DIARRHEA: 0
VOICE CHANGE: 0
CHEST TIGHTNESS: 0
WHEEZING: 0
STRIDOR: 0
TROUBLE SWALLOWING: 0
SHORTNESS OF BREATH: 0
ABDOMINAL DISTENTION: 0
VOMITING: 0
COUGH: 0
ABDOMINAL PAIN: 0
CHOKING: 0
NAUSEA: 0
CONSTIPATION: 0
PHOTOPHOBIA: 0

## 2024-10-17 NOTE — ASSESSMENT & PLAN NOTE
She reports doing well with Zoloft.  Will continue with Zoloft 50 mg at this time.  If notice any worsening of anxiety will increase dose as needed.  She is doing well with weaning down from Xanax.

## 2024-10-17 NOTE — ASSESSMENT & PLAN NOTE
This is a chronic issue.  Unstable.  She is doing well with weaning down from Xanax.  Will continue with our weaning schedule.  Currently taking 0.5 mg at nighttime.  Due again for refill next week, will reduce dose to 0.25 mg.  Continue with trazodone, increase to 2 to 3 tablets as needed nightly for sleep.

## 2024-10-17 NOTE — PROGRESS NOTES
Shakira Hobson, was evaluated through a synchronous (real-time) audio-video encounter. The patient (or guardian if applicable) is aware that this is a billable service, which includes applicable co-pays. This Virtual Visit was conducted with patient's (and/or legal guardian's) consent. Patient identification was verified, and a caregiver was present when appropriate.   The patient was located at Home: [address] [place] [address] [place] [address]  Provider was located at Facility (Appt Dept): [address] [place] [address], [address]  [place] [address]  Confirm you are appropriately licensed, registered, or certified to deliver care in the state where the patient is located as indicated above. If you are not or unsure, please re-schedule the visit: Yes, I confirm.     Shakira Hobson (:  1960) is a Established patient, presenting virtually for evaluation of the following:      Below is the assessment and plan developed based on review of pertinent history, physical exam, labs, studies, and medications.     Assessment & Plan  Psychophysiological insomnia  This is a chronic issue.  Unstable.  She is doing well with weaning down from Xanax.  Will continue with our weaning schedule.  Currently taking 0.5 mg at nighttime.  Due again for refill next week, will reduce dose to 0.25 mg.  Continue with trazodone, increase to 2 to 3 tablets as needed nightly for sleep.         Anxiety  She reports doing well with Zoloft.  Will continue with Zoloft 50 mg at this time.  If notice any worsening of anxiety will increase dose as needed.  She is doing well with weaning down from Xanax.           Return in about 8 weeks (around 2024) for Routine follow-up, Sleep, Anxiety.       Subjective   HPI  Lakesha presented today for follow-up visit.  She report doing well with weaning down from alprazolam.  She has been taking trazodone at nighttime for sleep.  Seem to help some but still wake up in the middle of the night.  She plans to

## 2024-10-21 DIAGNOSIS — F41.9 ANXIETY: ICD-10-CM

## 2024-10-22 RX ORDER — ALPRAZOLAM 0.5 MG
TABLET ORAL
Qty: 30 TABLET | Refills: 0 | Status: SHIPPED | OUTPATIENT
Start: 2024-10-22 | End: 2024-11-21

## 2024-11-15 RX ORDER — RAMIPRIL 10 MG/1
CAPSULE ORAL
Qty: 90 CAPSULE | Refills: 1 | Status: SHIPPED | OUTPATIENT
Start: 2024-11-15

## 2024-11-15 NOTE — TELEPHONE ENCOUNTER
Pt's pharmacy is requesting the following refills    sertraline (ZOLOFT) 50 MG tablet [[id number]]    ramipril (ALTACE) 10 MG capsule     [facility] - [address] [phone] - F [phone]

## 2024-11-16 DIAGNOSIS — F41.9 ANXIETY: ICD-10-CM

## 2024-11-19 RX ORDER — ALPRAZOLAM 0.25 MG/1
0.25 TABLET ORAL NIGHTLY PRN
Qty: 30 TABLET | Refills: 0 | Status: SHIPPED | OUTPATIENT
Start: 2024-11-19 | End: 2024-12-19

## 2024-11-19 NOTE — TELEPHONE ENCOUNTER
Last appointment: 10/17/2024  Next appointment: 12/19/2024  Last refill: 10/22/24  Requested Prescriptions     Pending Prescriptions Disp Refills    ALPRAZolam (XANAX) 0.5 MG tablet [Pharmacy Med Name: ALPRAZolam 0.5 MG TABLET] 30 tablet      Sig: TAKE ONE TABLET BY MOUTH ONCE NIGHTLY AS NEEDED FOR SLEEP OR ANXIETY FOR UP TO 30 DAYS

## 2024-11-22 ENCOUNTER — HOSPITAL ENCOUNTER (OUTPATIENT)
Dept: MAMMOGRAPHY | Age: 64
Discharge: HOME | End: 2024-11-22
Payer: COMMERCIAL

## 2024-11-22 VITALS — HEIGHT: 64 IN | BODY MASS INDEX: 29.19 KG/M2 | WEIGHT: 171 LBS

## 2024-11-22 DIAGNOSIS — Z12.31 VISIT FOR SCREENING MAMMOGRAM: ICD-10-CM

## 2024-11-22 PROCEDURE — 77063 BREAST TOMOSYNTHESIS BI: CPT

## 2024-11-25 RX ORDER — TRAZODONE HYDROCHLORIDE 50 MG/1
TABLET, FILM COATED ORAL
Qty: 270 TABLET | Refills: 1 | Status: SHIPPED | OUTPATIENT
Start: 2024-11-25

## 2024-12-19 ENCOUNTER — TELEMEDICINE (OUTPATIENT)
Dept: INTERNAL MEDICINE CLINIC | Age: 64
End: 2024-12-19
Payer: COMMERCIAL

## 2024-12-19 DIAGNOSIS — F41.9 ANXIETY: ICD-10-CM

## 2024-12-19 DIAGNOSIS — F51.04 PSYCHOPHYSIOLOGICAL INSOMNIA: Primary | ICD-10-CM

## 2024-12-19 PROCEDURE — 1036F TOBACCO NON-USER: CPT | Performed by: STUDENT IN AN ORGANIZED HEALTH CARE EDUCATION/TRAINING PROGRAM

## 2024-12-19 PROCEDURE — G8417 CALC BMI ABV UP PARAM F/U: HCPCS | Performed by: STUDENT IN AN ORGANIZED HEALTH CARE EDUCATION/TRAINING PROGRAM

## 2024-12-19 PROCEDURE — G8427 DOCREV CUR MEDS BY ELIG CLIN: HCPCS | Performed by: STUDENT IN AN ORGANIZED HEALTH CARE EDUCATION/TRAINING PROGRAM

## 2024-12-19 PROCEDURE — 3017F COLORECTAL CA SCREEN DOC REV: CPT | Performed by: STUDENT IN AN ORGANIZED HEALTH CARE EDUCATION/TRAINING PROGRAM

## 2024-12-19 PROCEDURE — G8484 FLU IMMUNIZE NO ADMIN: HCPCS | Performed by: STUDENT IN AN ORGANIZED HEALTH CARE EDUCATION/TRAINING PROGRAM

## 2024-12-19 PROCEDURE — 99214 OFFICE O/P EST MOD 30 MIN: CPT | Performed by: STUDENT IN AN ORGANIZED HEALTH CARE EDUCATION/TRAINING PROGRAM

## 2024-12-19 RX ORDER — ALPRAZOLAM 0.25 MG/1
TABLET ORAL
Qty: 7 TABLET | Refills: 0 | Status: SHIPPED | OUTPATIENT
Start: 2024-12-19 | End: 2025-01-02

## 2024-12-19 ASSESSMENT — ENCOUNTER SYMPTOMS
COUGH: 0
STRIDOR: 0
ABDOMINAL DISTENTION: 0
VOICE CHANGE: 0
TROUBLE SWALLOWING: 0
DIARRHEA: 0
ABDOMINAL PAIN: 0
CHEST TIGHTNESS: 0
WHEEZING: 0
NAUSEA: 0
SHORTNESS OF BREATH: 0
APNEA: 0
CONSTIPATION: 0
VOMITING: 0
CHOKING: 0
PHOTOPHOBIA: 0

## 2024-12-19 NOTE — ASSESSMENT & PLAN NOTE
Increase in stress at work.  However report doing well relatively well.  Will see how she does and follow-up visit.  If continues to be worsen, we will consider to increase her Zoloft.  Wean down Xanax

## 2024-12-19 NOTE — PROGRESS NOTES
taking very low-dose Xanax the night.  Review of Systems   Constitutional:  Negative for activity change, appetite change, chills, diaphoresis, fatigue, fever and unexpected weight change.   HENT:  Negative for trouble swallowing and voice change.    Eyes:  Negative for photophobia and visual disturbance.   Respiratory:  Negative for apnea, cough, choking, chest tightness, shortness of breath, wheezing and stridor.    Cardiovascular:  Negative for chest pain, palpitations and leg swelling.   Gastrointestinal:  Negative for abdominal distention, abdominal pain, constipation, diarrhea, nausea and vomiting.   Genitourinary:  Negative for difficulty urinating and dysuria.   Skin:  Negative for rash and wound.   Neurological:  Negative for dizziness, weakness and light-headedness.   Psychiatric/Behavioral:  Negative for agitation and behavioral problems.           Objective   Patient-Reported Vitals  Patient-Reported Weight: 166.2 lbs         [INSTRUCTIONS:  \"[x]\" Indicates a positive item  \"[]\" Indicates a negative item  -- DELETE ALL ITEMS NOT EXAMINED]    Constitutional: [x] Appears well-developed and well-nourished [x] No apparent distress      [] Abnormal -     Mental status: [x] Alert and awake  [x] Oriented to person/place/time [x] Able to follow commands    [] Abnormal -     Eyes:   EOM    [x]  Normal    [] Abnormal -   Sclera  [x]  Normal    [] Abnormal -          Discharge [x]  None visible   [] Abnormal -     HENT: [x] Normocephalic, atraumatic  [] Abnormal -   [x] Mouth/Throat: Mucous membranes are moist    External Ears [x] Normal  [] Abnormal -    Neck: [x] No visualized mass [] Abnormal -     Pulmonary/Chest: [x] Respiratory effort normal   [x] No visualized signs of difficulty breathing or respiratory distress        [] Abnormal -      Musculoskeletal:   [x] Normal gait with no signs of ataxia         [x] Normal range of motion of neck        [] Abnormal -     Neurological:        [x] No Facial Asymmetry

## 2024-12-19 NOTE — ASSESSMENT & PLAN NOTE
Increase in stress at work.  However report doing well relatively well.  Will see how she does and follow-up visit.  If continues to be worsen, we will consider to increase her Zoloft.  Weaning Xanax  Orders:    ALPRAZolam (XANAX) 0.25 MG tablet; Take 1 tablet every other night for one week. Take 1 tablet every third night for another week.

## 2024-12-19 NOTE — ASSESSMENT & PLAN NOTE
Doing better with trazodone.  Continue with 150 mg every night as needed for sleep.  She is doing well with weaning down from Xanax.  We will wean off over the next 2 to 3 weeks.

## 2025-01-20 SDOH — ECONOMIC STABILITY: INCOME INSECURITY: IN THE LAST 12 MONTHS, WAS THERE A TIME WHEN YOU WERE NOT ABLE TO PAY THE MORTGAGE OR RENT ON TIME?: NO

## 2025-01-20 SDOH — ECONOMIC STABILITY: FOOD INSECURITY: WITHIN THE PAST 12 MONTHS, YOU WORRIED THAT YOUR FOOD WOULD RUN OUT BEFORE YOU GOT MONEY TO BUY MORE.: NEVER TRUE

## 2025-01-20 SDOH — ECONOMIC STABILITY: TRANSPORTATION INSECURITY
IN THE PAST 12 MONTHS, HAS THE LACK OF TRANSPORTATION KEPT YOU FROM MEDICAL APPOINTMENTS OR FROM GETTING MEDICATIONS?: NO

## 2025-01-20 SDOH — ECONOMIC STABILITY: FOOD INSECURITY: WITHIN THE PAST 12 MONTHS, THE FOOD YOU BOUGHT JUST DIDN'T LAST AND YOU DIDN'T HAVE MONEY TO GET MORE.: NEVER TRUE

## 2025-01-24 ENCOUNTER — TELEMEDICINE (OUTPATIENT)
Dept: INTERNAL MEDICINE CLINIC | Age: 65
End: 2025-01-24
Payer: COMMERCIAL

## 2025-01-24 DIAGNOSIS — F51.04 PSYCHOPHYSIOLOGICAL INSOMNIA: ICD-10-CM

## 2025-01-24 DIAGNOSIS — F41.9 ANXIETY: Primary | ICD-10-CM

## 2025-01-24 PROCEDURE — 99214 OFFICE O/P EST MOD 30 MIN: CPT | Performed by: STUDENT IN AN ORGANIZED HEALTH CARE EDUCATION/TRAINING PROGRAM

## 2025-01-24 PROCEDURE — 3017F COLORECTAL CA SCREEN DOC REV: CPT | Performed by: STUDENT IN AN ORGANIZED HEALTH CARE EDUCATION/TRAINING PROGRAM

## 2025-01-24 PROCEDURE — G8417 CALC BMI ABV UP PARAM F/U: HCPCS | Performed by: STUDENT IN AN ORGANIZED HEALTH CARE EDUCATION/TRAINING PROGRAM

## 2025-01-24 PROCEDURE — G8427 DOCREV CUR MEDS BY ELIG CLIN: HCPCS | Performed by: STUDENT IN AN ORGANIZED HEALTH CARE EDUCATION/TRAINING PROGRAM

## 2025-01-24 PROCEDURE — 1036F TOBACCO NON-USER: CPT | Performed by: STUDENT IN AN ORGANIZED HEALTH CARE EDUCATION/TRAINING PROGRAM

## 2025-01-24 RX ORDER — ATORVASTATIN CALCIUM 40 MG/1
40 TABLET, FILM COATED ORAL DAILY
COMMUNITY
Start: 2024-09-06

## 2025-01-24 RX ORDER — SEMAGLUTIDE 1.34 MG/ML
1 INJECTION, SOLUTION SUBCUTANEOUS WEEKLY
COMMUNITY
Start: 2025-01-03

## 2025-01-24 ASSESSMENT — PATIENT HEALTH QUESTIONNAIRE - PHQ9
7. TROUBLE CONCENTRATING ON THINGS, SUCH AS READING THE NEWSPAPER OR WATCHING TELEVISION: NOT AT ALL
4. FEELING TIRED OR HAVING LITTLE ENERGY: NOT AT ALL
SUM OF ALL RESPONSES TO PHQ QUESTIONS 1-9: 0
1. LITTLE INTEREST OR PLEASURE IN DOING THINGS: NOT AT ALL
SUM OF ALL RESPONSES TO PHQ QUESTIONS 1-9: 0
SUM OF ALL RESPONSES TO PHQ9 QUESTIONS 1 & 2: 0
3. TROUBLE FALLING OR STAYING ASLEEP: NOT AT ALL
6. FEELING BAD ABOUT YOURSELF - OR THAT YOU ARE A FAILURE OR HAVE LET YOURSELF OR YOUR FAMILY DOWN: NOT AT ALL
2. FEELING DOWN, DEPRESSED OR HOPELESS: NOT AT ALL
5. POOR APPETITE OR OVEREATING: NOT AT ALL
10. IF YOU CHECKED OFF ANY PROBLEMS, HOW DIFFICULT HAVE THESE PROBLEMS MADE IT FOR YOU TO DO YOUR WORK, TAKE CARE OF THINGS AT HOME, OR GET ALONG WITH OTHER PEOPLE: NOT DIFFICULT AT ALL
9. THOUGHTS THAT YOU WOULD BE BETTER OFF DEAD, OR OF HURTING YOURSELF: NOT AT ALL
SUM OF ALL RESPONSES TO PHQ QUESTIONS 1-9: 0
8. MOVING OR SPEAKING SO SLOWLY THAT OTHER PEOPLE COULD HAVE NOTICED. OR THE OPPOSITE, BEING SO FIGETY OR RESTLESS THAT YOU HAVE BEEN MOVING AROUND A LOT MORE THAN USUAL: NOT AT ALL
SUM OF ALL RESPONSES TO PHQ QUESTIONS 1-9: 0

## 2025-01-24 ASSESSMENT — ENCOUNTER SYMPTOMS
TROUBLE SWALLOWING: 0
NAUSEA: 0
ABDOMINAL DISTENTION: 0
COUGH: 0
CONSTIPATION: 0
SHORTNESS OF BREATH: 0
WHEEZING: 0
CHEST TIGHTNESS: 0
CHOKING: 0
VOICE CHANGE: 0
VOMITING: 0
DIARRHEA: 0
ABDOMINAL PAIN: 0
PHOTOPHOBIA: 0
APNEA: 0
STRIDOR: 0

## 2025-01-24 NOTE — ASSESSMENT & PLAN NOTE
now off from Xanax   Trazodone helps rich still not have fragmented sleep, wakes up n the middle of the nights  Increase to 200 mg from 150 mg. If not workiing then wean off over 2-3 weeks and start Doxepin

## 2025-01-24 NOTE — PROGRESS NOTES
Shakira Hobson, was evaluated through a synchronous (real-time) audio-video encounter. The patient (or guardian if applicable) is aware that this is a billable service, which includes applicable co-pays. This Virtual Visit was conducted with patient's (and/or legal guardian's) consent. Patient identification was verified, and a caregiver was present when appropriate.   The patient was located at Home: [address] [place] [address] [place] [address]  Provider was located at Facility (Appt Dept): [address] [place] [address], [address]  [place] [address]  Confirm you are appropriately licensed, registered, or certified to deliver care in the state where the patient is located as indicated above. If you are not or unsure, please re-schedule the visit: Yes, I confirm.     Shakira Hobson (:  1960) is a Established patient, presenting virtually for evaluation of the following:      Below is the assessment and plan developed based on review of pertinent history, physical exam, labs, studies, and medications.     Assessment & Plan  Anxiety     Doing well but still have some sleep issues.   Increase zoloft to 75 mg from 50 mg        Psychophysiological insomnia          now off from Xanax   Trazodone helps rich still not have fragmented sleep, wakes up n the middle of the nights  Increase to 200 mg from 150 mg. If not workiing then wean off over 2-3 weeks and start Doxepin    Return in about 4 weeks (around 2025) for Anxiety, Sleep.       Subjective   Diabetes  Pertinent negatives for hypoglycemia include no dizziness. Pertinent negatives for diabetes include no chest pain, no fatigue and no weakness.   Hyperlipidemia  Pertinent negatives include no chest pain or shortness of breath.   Hypertension  Pertinent negatives include no chest pain, palpitations or shortness of breath.     She is doing well  Off from Xanax now   But continues     Review of Systems   Constitutional:  Negative for activity change, appetite

## 2025-02-10 ENCOUNTER — TELEMEDICINE (OUTPATIENT)
Dept: INTERNAL MEDICINE CLINIC | Age: 65
End: 2025-02-10
Payer: COMMERCIAL

## 2025-02-10 DIAGNOSIS — F41.9 ANXIETY: ICD-10-CM

## 2025-02-10 DIAGNOSIS — F51.04 PSYCHOPHYSIOLOGICAL INSOMNIA: Primary | ICD-10-CM

## 2025-02-10 PROCEDURE — G8427 DOCREV CUR MEDS BY ELIG CLIN: HCPCS | Performed by: STUDENT IN AN ORGANIZED HEALTH CARE EDUCATION/TRAINING PROGRAM

## 2025-02-10 PROCEDURE — 99214 OFFICE O/P EST MOD 30 MIN: CPT | Performed by: STUDENT IN AN ORGANIZED HEALTH CARE EDUCATION/TRAINING PROGRAM

## 2025-02-10 PROCEDURE — G8417 CALC BMI ABV UP PARAM F/U: HCPCS | Performed by: STUDENT IN AN ORGANIZED HEALTH CARE EDUCATION/TRAINING PROGRAM

## 2025-02-10 PROCEDURE — 1036F TOBACCO NON-USER: CPT | Performed by: STUDENT IN AN ORGANIZED HEALTH CARE EDUCATION/TRAINING PROGRAM

## 2025-02-10 PROCEDURE — 3017F COLORECTAL CA SCREEN DOC REV: CPT | Performed by: STUDENT IN AN ORGANIZED HEALTH CARE EDUCATION/TRAINING PROGRAM

## 2025-02-10 RX ORDER — DOXEPIN HYDROCHLORIDE 10 MG/1
10 CAPSULE ORAL NIGHTLY
Qty: 30 CAPSULE | Refills: 3 | Status: SHIPPED | OUTPATIENT
Start: 2025-02-10

## 2025-02-10 ASSESSMENT — ENCOUNTER SYMPTOMS
CHEST TIGHTNESS: 0
VOICE CHANGE: 0
CHOKING: 0
ABDOMINAL DISTENTION: 0
SHORTNESS OF BREATH: 0
ABDOMINAL PAIN: 0
DIARRHEA: 0
TROUBLE SWALLOWING: 0
CONSTIPATION: 0
PHOTOPHOBIA: 0
VOMITING: 0
APNEA: 0
NAUSEA: 0
STRIDOR: 0
COUGH: 0
WHEEZING: 0

## 2025-02-10 NOTE — ASSESSMENT & PLAN NOTE
Zoloft was increased to 75 mg at the last visit.  She report doing well feel better at work. Continue with Zoloft 75 mg

## 2025-02-10 NOTE — PROGRESS NOTES
Shakira Hobson, was evaluated through a synchronous (real-time) audio-video encounter. The patient (or guardian if applicable) is aware that this is a billable service, which includes applicable co-pays. This Virtual Visit was conducted with patient's (and/or legal guardian's) consent. Patient identification was verified, and a caregiver was present when appropriate.   The patient was located at Home: [address] [place] [address] [place] [address]  Provider was located at Facility (Appt Dept): [address] [place] [address], [address]  [place] [address]  Confirm you are appropriately licensed, registered, or certified to deliver care in the state where the patient is located as indicated above. If you are not or unsure, please re-schedule the visit: Yes, I confirm.     Shakira Hobson (:  1960) is a Established patient, presenting virtually for evaluation of the following:    Chief Complaint   Patient presents with    Medication Check     Review medications         Below is the assessment and plan developed based on review of pertinent history, physical exam, labs, studies, and medications.     Assessment & Plan  Psychophysiological insomnia   She is now off from Xanax.  Also will often trazodone as it does not help with her insomnia.  Start doxepin    Orders:    doxepin (SINEQUAN) 10 MG capsule; Take 1 capsule by mouth nightly    Anxiety   Zoloft was increased to 75 mg at the last visit.  She report doing well feel better at work. Continue with Zoloft 75 mg           Return in about 8 weeks (around 2025) for Routine follow-up. - insomnia       Subjective   HPI  Shakira presented virtually today for follow-up visit.  Seen her last visit she is able to wean off from trazodone.   Reports improvement in anxiety with increased Zoloft dose    Review of Systems   Constitutional:  Negative for activity change, appetite change, chills, diaphoresis, fatigue, fever and unexpected weight change.   HENT:  Negative for trouble

## 2025-02-10 NOTE — ASSESSMENT & PLAN NOTE
She is now off from Xanax.  Also will often trazodone as it does not help with her insomnia.  Start doxepin    Orders:    doxepin (SINEQUAN) 10 MG capsule; Take 1 capsule by mouth nightly

## 2025-04-18 NOTE — TELEPHONE ENCOUNTER
Pt needs refill     sertraline (ZOLOFT) 75 MG tablet [[id number]]     [facility] - [facility] [address] [place] [address] [phone] - F [phone]

## 2025-04-22 ENCOUNTER — PATIENT MESSAGE (OUTPATIENT)
Dept: INTERNAL MEDICINE CLINIC | Age: 65
End: 2025-04-22

## 2025-04-22 RX ORDER — RAMIPRIL 10 MG/1
CAPSULE ORAL
Qty: 90 CAPSULE | Refills: 1 | Status: SHIPPED | OUTPATIENT
Start: 2025-04-22

## 2025-04-22 NOTE — TELEPHONE ENCOUNTER
Rx was sent with 2 sets of directions. This has been corrected and sent to Dr Knowles to authorize new Rx with correct dose on 1.5 tablets daily.   
Head Injury

## 2025-04-22 NOTE — TELEPHONE ENCOUNTER
Last appointment: 2/10/2025  Next appointment: Visit date not found        Last refill: 11/15/24

## 2025-04-23 ENCOUNTER — TELEPHONE (OUTPATIENT)
Dept: INTERNAL MEDICINE CLINIC | Age: 65
End: 2025-04-23

## 2025-04-23 NOTE — TELEPHONE ENCOUNTER
Pts pharmacy needs clarification on medication directions for medication below. Continue pt history 1 tablet daily for mood, #90 + 1 refill or Dose increase 1.5 tablets daily for mood, #135 + 1 refill?      sertraline (ZOLOFT) 50 MG tablet [[id number]]     [facility] - [facility] [address] -  [phone] - F [phone]

## 2025-06-04 DIAGNOSIS — F51.04 PSYCHOPHYSIOLOGICAL INSOMNIA: ICD-10-CM

## 2025-06-04 RX ORDER — DOXEPIN HYDROCHLORIDE 10 MG/1
CAPSULE ORAL
Qty: 90 CAPSULE | Refills: 1 | Status: SHIPPED | OUTPATIENT
Start: 2025-06-04

## 2025-08-06 ENCOUNTER — HOSPITAL ENCOUNTER (OUTPATIENT)
Dept: CT IMAGING | Age: 65
Discharge: HOME | End: 2025-08-06
Payer: COMMERCIAL

## 2025-08-06 DIAGNOSIS — R91.1 PULMONARY NODULE 1 CM OR GREATER IN DIAMETER: ICD-10-CM

## 2025-08-06 PROCEDURE — 71250 CT THORAX DX C-: CPT

## (undated) DEVICE — TUBING FLUIDICS BRONCHOSCOPE

## (undated) DEVICE — FORCEPS BIOPSY SMOOTH CUP

## (undated) DEVICE — BRONCHOSCOPES MONARCH

## (undated) DEVICE — NEEDLE BRONCHSCP 21GA HNDL SHTH NDL STYL PERIVIEW FLX

## (undated) DEVICE — BRUSH CYTO L115CM DIA1.9MM 3 NDL PULM USE SUPERDIMENSION

## (undated) DEVICE — CONMED PEDIATRIC GASTROSCOPE SHEATHED CYTOLOGY BRUSH, RING HANDLE, 3 MM X 160 CM: Brand: CONMED

## (undated) DEVICE — Z DISCONTINUED USE 2749457 TUBING SAMP AD W12.5XH8.4IN D9.1IN NSL ORAL SMRT CAPNOLINE

## (undated) DEVICE — BRUSH CYTO L115MM DIA1.9MM 3 UNIQUE 3 BRSH DSGN